# Patient Record
Sex: FEMALE | Race: ASIAN | NOT HISPANIC OR LATINO | URBAN - METROPOLITAN AREA
[De-identification: names, ages, dates, MRNs, and addresses within clinical notes are randomized per-mention and may not be internally consistent; named-entity substitution may affect disease eponyms.]

---

## 2019-06-14 ENCOUNTER — INPATIENT (INPATIENT)
Facility: HOSPITAL | Age: 74
LOS: 4 days | Discharge: ANOTHER HOSPITAL | DRG: 305 | End: 2019-06-19
Attending: INTERNAL MEDICINE | Admitting: INTERNAL MEDICINE
Payer: MEDICARE

## 2019-06-14 VITALS
WEIGHT: 134.92 LBS | OXYGEN SATURATION: 95 % | SYSTOLIC BLOOD PRESSURE: 113 MMHG | DIASTOLIC BLOOD PRESSURE: 76 MMHG | HEART RATE: 86 BPM | HEIGHT: 62 IN | RESPIRATION RATE: 20 BRPM | TEMPERATURE: 98 F

## 2019-06-14 DIAGNOSIS — N18.9 CHRONIC KIDNEY DISEASE, UNSPECIFIED: ICD-10-CM

## 2019-06-14 DIAGNOSIS — I10 ESSENTIAL (PRIMARY) HYPERTENSION: ICD-10-CM

## 2019-06-14 DIAGNOSIS — Z29.9 ENCOUNTER FOR PROPHYLACTIC MEASURES, UNSPECIFIED: ICD-10-CM

## 2019-06-14 DIAGNOSIS — E78.5 HYPERLIPIDEMIA, UNSPECIFIED: ICD-10-CM

## 2019-06-14 DIAGNOSIS — Z95.810 PRESENCE OF AUTOMATIC (IMPLANTABLE) CARDIAC DEFIBRILLATOR: Chronic | ICD-10-CM

## 2019-06-14 DIAGNOSIS — E11.9 TYPE 2 DIABETES MELLITUS WITHOUT COMPLICATIONS: ICD-10-CM

## 2019-06-14 DIAGNOSIS — I21.4 NON-ST ELEVATION (NSTEMI) MYOCARDIAL INFARCTION: ICD-10-CM

## 2019-06-14 DIAGNOSIS — I50.9 HEART FAILURE, UNSPECIFIED: ICD-10-CM

## 2019-06-14 DIAGNOSIS — I25.10 ATHEROSCLEROTIC HEART DISEASE OF NATIVE CORONARY ARTERY WITHOUT ANGINA PECTORIS: ICD-10-CM

## 2019-06-14 LAB
ALBUMIN SERPL ELPH-MCNC: 2.7 G/DL — LOW (ref 3.5–5)
ALP SERPL-CCNC: 103 U/L — SIGNIFICANT CHANGE UP (ref 40–120)
ALT FLD-CCNC: 20 U/L DA — SIGNIFICANT CHANGE UP (ref 10–60)
ANION GAP SERPL CALC-SCNC: 7 MMOL/L — SIGNIFICANT CHANGE UP (ref 5–17)
APTT BLD: 89.8 SEC — HIGH (ref 27.5–36.3)
AST SERPL-CCNC: 15 U/L — SIGNIFICANT CHANGE UP (ref 10–40)
BASOPHILS # BLD AUTO: 0.05 K/UL — SIGNIFICANT CHANGE UP (ref 0–0.2)
BASOPHILS NFR BLD AUTO: 0.4 % — SIGNIFICANT CHANGE UP (ref 0–2)
BILIRUB SERPL-MCNC: 0.5 MG/DL — SIGNIFICANT CHANGE UP (ref 0.2–1.2)
BUN SERPL-MCNC: 40 MG/DL — HIGH (ref 7–18)
CALCIUM SERPL-MCNC: 8 MG/DL — LOW (ref 8.4–10.5)
CHLORIDE SERPL-SCNC: 105 MMOL/L — SIGNIFICANT CHANGE UP (ref 96–108)
CK MB BLD-MCNC: 3 % — SIGNIFICANT CHANGE UP (ref 0–3.5)
CK MB CFR SERPL CALC: 2.2 NG/ML — SIGNIFICANT CHANGE UP (ref 0–3.6)
CK SERPL-CCNC: 74 U/L — SIGNIFICANT CHANGE UP (ref 21–215)
CO2 SERPL-SCNC: 24 MMOL/L — SIGNIFICANT CHANGE UP (ref 22–31)
CREAT SERPL-MCNC: 1.66 MG/DL — HIGH (ref 0.5–1.3)
EOSINOPHIL # BLD AUTO: 0.08 K/UL — SIGNIFICANT CHANGE UP (ref 0–0.5)
EOSINOPHIL NFR BLD AUTO: 0.7 % — SIGNIFICANT CHANGE UP (ref 0–6)
GLUCOSE BLDC GLUCOMTR-MCNC: 115 MG/DL — HIGH (ref 70–99)
GLUCOSE BLDC GLUCOMTR-MCNC: 169 MG/DL — HIGH (ref 70–99)
GLUCOSE SERPL-MCNC: 202 MG/DL — HIGH (ref 70–99)
HCT VFR BLD CALC: 30.9 % — LOW (ref 34.5–45)
HCT VFR BLD CALC: 31 % — LOW (ref 34.5–45)
HGB BLD-MCNC: 10.2 G/DL — LOW (ref 11.5–15.5)
HGB BLD-MCNC: 10.2 G/DL — LOW (ref 11.5–15.5)
IMM GRANULOCYTES NFR BLD AUTO: 0.4 % — SIGNIFICANT CHANGE UP (ref 0–1.5)
INR BLD: 1.16 RATIO — SIGNIFICANT CHANGE UP (ref 0.88–1.16)
LIDOCAIN IGE QN: 107 U/L — SIGNIFICANT CHANGE UP (ref 73–393)
LYMPHOCYTES # BLD AUTO: 1.89 K/UL — SIGNIFICANT CHANGE UP (ref 1–3.3)
LYMPHOCYTES # BLD AUTO: 16.9 % — SIGNIFICANT CHANGE UP (ref 13–44)
MAGNESIUM SERPL-MCNC: 2.4 MG/DL — SIGNIFICANT CHANGE UP (ref 1.6–2.6)
MCHC RBC-ENTMCNC: 27.1 PG — SIGNIFICANT CHANGE UP (ref 27–34)
MCHC RBC-ENTMCNC: 27.4 PG — SIGNIFICANT CHANGE UP (ref 27–34)
MCHC RBC-ENTMCNC: 32.9 GM/DL — SIGNIFICANT CHANGE UP (ref 32–36)
MCHC RBC-ENTMCNC: 33 GM/DL — SIGNIFICANT CHANGE UP (ref 32–36)
MCV RBC AUTO: 82.4 FL — SIGNIFICANT CHANGE UP (ref 80–100)
MCV RBC AUTO: 83.1 FL — SIGNIFICANT CHANGE UP (ref 80–100)
MONOCYTES # BLD AUTO: 0.78 K/UL — SIGNIFICANT CHANGE UP (ref 0–0.9)
MONOCYTES NFR BLD AUTO: 7 % — SIGNIFICANT CHANGE UP (ref 2–14)
NEUTROPHILS # BLD AUTO: 8.32 K/UL — HIGH (ref 1.8–7.4)
NEUTROPHILS NFR BLD AUTO: 74.6 % — SIGNIFICANT CHANGE UP (ref 43–77)
NRBC # BLD: 0 /100 WBCS — SIGNIFICANT CHANGE UP (ref 0–0)
NRBC # BLD: 0 /100 WBCS — SIGNIFICANT CHANGE UP (ref 0–0)
PLATELET # BLD AUTO: 263 K/UL — SIGNIFICANT CHANGE UP (ref 150–400)
PLATELET # BLD AUTO: 280 K/UL — SIGNIFICANT CHANGE UP (ref 150–400)
POTASSIUM SERPL-MCNC: 3.9 MMOL/L — SIGNIFICANT CHANGE UP (ref 3.5–5.3)
POTASSIUM SERPL-SCNC: 3.9 MMOL/L — SIGNIFICANT CHANGE UP (ref 3.5–5.3)
PROT SERPL-MCNC: 7.6 G/DL — SIGNIFICANT CHANGE UP (ref 6–8.3)
PROTHROM AB SERPL-ACNC: 12.9 SEC — SIGNIFICANT CHANGE UP (ref 10–12.9)
RBC # BLD: 3.72 M/UL — LOW (ref 3.8–5.2)
RBC # BLD: 3.76 M/UL — LOW (ref 3.8–5.2)
RBC # FLD: 13.5 % — SIGNIFICANT CHANGE UP (ref 10.3–14.5)
RBC # FLD: 13.6 % — SIGNIFICANT CHANGE UP (ref 10.3–14.5)
SODIUM SERPL-SCNC: 136 MMOL/L — SIGNIFICANT CHANGE UP (ref 135–145)
TROPONIN I SERPL-MCNC: 2.77 NG/ML — HIGH (ref 0–0.04)
TROPONIN I SERPL-MCNC: 3.14 NG/ML — HIGH (ref 0–0.04)
WBC # BLD: 10.43 K/UL — SIGNIFICANT CHANGE UP (ref 3.8–10.5)
WBC # BLD: 11.17 K/UL — HIGH (ref 3.8–10.5)
WBC # FLD AUTO: 10.43 K/UL — SIGNIFICANT CHANGE UP (ref 3.8–10.5)
WBC # FLD AUTO: 11.17 K/UL — HIGH (ref 3.8–10.5)

## 2019-06-14 PROCEDURE — 71046 X-RAY EXAM CHEST 2 VIEWS: CPT | Mod: 26

## 2019-06-14 PROCEDURE — 99285 EMERGENCY DEPT VISIT HI MDM: CPT

## 2019-06-14 RX ORDER — GLUCAGON INJECTION, SOLUTION 0.5 MG/.1ML
1 INJECTION, SOLUTION SUBCUTANEOUS ONCE
Refills: 0 | Status: DISCONTINUED | OUTPATIENT
Start: 2019-06-14 | End: 2019-06-19

## 2019-06-14 RX ORDER — DEXTROSE 50 % IN WATER 50 %
12.5 SYRINGE (ML) INTRAVENOUS ONCE
Refills: 0 | Status: DISCONTINUED | OUTPATIENT
Start: 2019-06-14 | End: 2019-06-17

## 2019-06-14 RX ORDER — ATORVASTATIN CALCIUM 80 MG/1
40 TABLET, FILM COATED ORAL AT BEDTIME
Refills: 0 | Status: DISCONTINUED | OUTPATIENT
Start: 2019-06-14 | End: 2019-06-19

## 2019-06-14 RX ORDER — ASPIRIN/CALCIUM CARB/MAGNESIUM 324 MG
81 TABLET ORAL DAILY
Refills: 0 | Status: DISCONTINUED | OUTPATIENT
Start: 2019-06-14 | End: 2019-06-19

## 2019-06-14 RX ORDER — METOPROLOL TARTRATE 50 MG
12.5 TABLET ORAL
Refills: 0 | Status: DISCONTINUED | OUTPATIENT
Start: 2019-06-14 | End: 2019-06-19

## 2019-06-14 RX ORDER — INSULIN GLARGINE 100 [IU]/ML
20 INJECTION, SOLUTION SUBCUTANEOUS AT BEDTIME
Refills: 0 | Status: DISCONTINUED | OUTPATIENT
Start: 2019-06-14 | End: 2019-06-16

## 2019-06-14 RX ORDER — DEXTROSE 50 % IN WATER 50 %
15 SYRINGE (ML) INTRAVENOUS ONCE
Refills: 0 | Status: DISCONTINUED | OUTPATIENT
Start: 2019-06-14 | End: 2019-06-17

## 2019-06-14 RX ORDER — DEXTROSE 50 % IN WATER 50 %
25 SYRINGE (ML) INTRAVENOUS ONCE
Refills: 0 | Status: DISCONTINUED | OUTPATIENT
Start: 2019-06-14 | End: 2019-06-17

## 2019-06-14 RX ORDER — AMLODIPINE BESYLATE 2.5 MG/1
2.5 TABLET ORAL DAILY
Refills: 0 | Status: DISCONTINUED | OUTPATIENT
Start: 2019-06-14 | End: 2019-06-19

## 2019-06-14 RX ORDER — ENOXAPARIN SODIUM 100 MG/ML
60 INJECTION SUBCUTANEOUS ONCE
Refills: 0 | Status: COMPLETED | OUTPATIENT
Start: 2019-06-14 | End: 2019-06-14

## 2019-06-14 RX ORDER — FAMOTIDINE 10 MG/ML
20 INJECTION INTRAVENOUS ONCE
Refills: 0 | Status: COMPLETED | OUTPATIENT
Start: 2019-06-14 | End: 2019-06-14

## 2019-06-14 RX ORDER — ASPIRIN/CALCIUM CARB/MAGNESIUM 324 MG
162 TABLET ORAL ONCE
Refills: 0 | Status: COMPLETED | OUTPATIENT
Start: 2019-06-14 | End: 2019-06-14

## 2019-06-14 RX ORDER — INSULIN LISPRO 100/ML
VIAL (ML) SUBCUTANEOUS AT BEDTIME
Refills: 0 | Status: DISCONTINUED | OUTPATIENT
Start: 2019-06-14 | End: 2019-06-19

## 2019-06-14 RX ORDER — HEPARIN SODIUM 5000 [USP'U]/ML
INJECTION INTRAVENOUS; SUBCUTANEOUS
Qty: 25000 | Refills: 0 | Status: DISCONTINUED | OUTPATIENT
Start: 2019-06-14 | End: 2019-06-18

## 2019-06-14 RX ORDER — INSULIN LISPRO 100/ML
VIAL (ML) SUBCUTANEOUS
Refills: 0 | Status: DISCONTINUED | OUTPATIENT
Start: 2019-06-14 | End: 2019-06-15

## 2019-06-14 RX ORDER — SODIUM CHLORIDE 9 MG/ML
1000 INJECTION, SOLUTION INTRAVENOUS
Refills: 0 | Status: DISCONTINUED | OUTPATIENT
Start: 2019-06-14 | End: 2019-06-17

## 2019-06-14 RX ORDER — TICAGRELOR 90 MG/1
90 TABLET ORAL
Refills: 0 | Status: DISCONTINUED | OUTPATIENT
Start: 2019-06-14 | End: 2019-06-19

## 2019-06-14 RX ORDER — HEPARIN SODIUM 5000 [USP'U]/ML
3800 INJECTION INTRAVENOUS; SUBCUTANEOUS EVERY 6 HOURS
Refills: 0 | Status: DISCONTINUED | OUTPATIENT
Start: 2019-06-14 | End: 2019-06-18

## 2019-06-14 RX ORDER — FUROSEMIDE 40 MG
20 TABLET ORAL DAILY
Refills: 0 | Status: DISCONTINUED | OUTPATIENT
Start: 2019-06-14 | End: 2019-06-19

## 2019-06-14 RX ORDER — FAMOTIDINE 10 MG/ML
20 INJECTION INTRAVENOUS ONCE
Refills: 0 | Status: COMPLETED | OUTPATIENT
Start: 2019-06-14 | End: 2019-06-15

## 2019-06-14 RX ADMIN — FAMOTIDINE 20 MILLIGRAM(S): 10 INJECTION INTRAVENOUS at 10:25

## 2019-06-14 RX ADMIN — HEPARIN SODIUM 650 UNIT(S)/HR: 5000 INJECTION INTRAVENOUS; SUBCUTANEOUS at 23:53

## 2019-06-14 RX ADMIN — HEPARIN SODIUM 0 UNIT(S)/HR: 5000 INJECTION INTRAVENOUS; SUBCUTANEOUS at 23:11

## 2019-06-14 RX ADMIN — ATORVASTATIN CALCIUM 40 MILLIGRAM(S): 80 TABLET, FILM COATED ORAL at 23:11

## 2019-06-14 RX ADMIN — Medication 30 MILLILITER(S): at 10:25

## 2019-06-14 RX ADMIN — Medication 162 MILLIGRAM(S): at 14:26

## 2019-06-14 RX ADMIN — HEPARIN SODIUM 750 UNIT(S)/HR: 5000 INJECTION INTRAVENOUS; SUBCUTANEOUS at 15:50

## 2019-06-14 RX ADMIN — ENOXAPARIN SODIUM 60 MILLIGRAM(S): 100 INJECTION SUBCUTANEOUS at 14:24

## 2019-06-14 RX ADMIN — INSULIN GLARGINE 20 UNIT(S): 100 INJECTION, SOLUTION SUBCUTANEOUS at 23:11

## 2019-06-14 NOTE — H&P ADULT - PROBLEM SELECTOR PLAN 1
-epigastric pain in setting of elevated trop of 2.6  -EKG sinus w/ ST depressions in 1, V1, V5, V6  -EKG findings may be 2/2 to pt's cardiomyopathy per cardio  -Recent echo performed this week at [facility] sig for EF of 20% w/ pulm htn, and recent stress test sig for fixed defects to apical and septal walls  -will start treatment with heparin drip for now   -continue to trend trops- T2@ 6p, and T3@ midnight   -serial EKG's  -c/w asa, statin, and BB  -f/u repeat echo  -Cardio: Dr. Hood

## 2019-06-14 NOTE — H&P ADULT - HISTORY OF PRESENT ILLNESS
Pt is a 73F, from home, w/ a PMHx of HTN, HLD, DM, CHF (EF 20%) w/ ICD in left chest, and CAD with who stent presents to the ED with abdominal painx Pt is a 73F, from home, w/ a PMHx of HTN, HLD, DM, CHF (EF 20%) w/ ICD in left chest, and CAD with stent placed Feb 2019 who presents to the ED with abdominal pain x 1 week.  Pt states the pain started on the Lt side of chest 1 week prior.  She went to [facility] and underwent cardiac eval including echo which showed known Systolic HF w/ EF 20% w/ pulm htn, and stress test neg for reversible ischemia but sig for fixed defects to apical and septal walls.  Troponin peaked at 1 at that time.  Pt was discharged with follow up to her OP cardiologist, Dr. Meehan, but she could not make the appt due to persistent pain. Pt states the pain is similar to when she has reflux and she started zantac therapy yesterday.  Pt now complains of epigastric pain radiating down abdomen.  Pt denies palpitations, SOB, dizziness, HA, blurred vision, slurred speech, N/V/D, constipation, fever or weakness.    In the ED, pt presents in no acute distress, vitals WNL, and EKG sinus w/ ST depressions in 1, V1, V5, V6

## 2019-06-14 NOTE — PATIENT PROFILE ADULT - VISION (WITH CORRECTIVE LENSES IF THE PATIENT USUALLY WEARS THEM):
Partially impaired: cannot see medication labels or newsprint, but can see obstacles in path, and the surrounding layout; can count fingers at arm's length/right eye blurry

## 2019-06-14 NOTE — ED ADULT NURSE NOTE - OBJECTIVE STATEMENT
pt c/o of epigastric and abdominal pain 10/10 sharp started last night at 0200AM. Pt c/o of nausea but denies vomiting. Last BM yesterday loose dark brown (pt took miraalax for constipation.) Pt brought in by daughter for further evaluation.

## 2019-06-14 NOTE — H&P ADULT - PROBLEM SELECTOR PLAN 3
-Stent placed in Nov, 2019  -c/w asa and brillinta therapy -elevated creat post stent placement- unknown baseline  -Pt sees OP nephro- Dr. Arnold   -managing team to contact OP nephro for baseline creat -On Lantus 35u HS, and SS TID  -will continue Lantus 20HS w/ sliding scale AC HS for now, uptitrate as clinically indicated   -f/u HbA1c  -Diabetic DIet

## 2019-06-14 NOTE — ED PROVIDER NOTE - OTHER FINDINGS
Isolated ST depression at lead I and borderline v5, v6 with T wave inversion at aVL. Borderline elevated ST waves at aVR.

## 2019-06-14 NOTE — H&P ADULT - PROBLEM SELECTOR PLAN 4
-c/w metoprolol therapy for now  -day team to attain medication list from daughter -Stent placed in Nov, 2019  -c/w asa and brillinta therapy -elevated creat post stent placement- unknown baseline  -Pt sees OP nephro- Dr. Arnold   -managing team to contact OP nephro for baseline creat

## 2019-06-14 NOTE — H&P ADULT - PROBLEM SELECTOR PLAN 8
[] Previous VTE                                                3  [] Thrombophilia                                             2  [] Lower limb paralysis                                   2    [] Current Cancer                                             2   [x] Immobilization > 24 hrs                              1  [] ICU/CCU stay > 24 hours                             1  [x] Age > 60                                                         1    IMPROVE VTE Score: On heparin Drip for ACS

## 2019-06-14 NOTE — H&P ADULT - PROBLEM SELECTOR PLAN 5
-c/w statin therapy   -f/u lipid panel -c/w metoprolol therapy for now  -resume lasix, hold ace with concern for renal dysfunction -Stent placed in Nov, 2019  -c/w asa and brillinta therapy

## 2019-06-14 NOTE — H&P ADULT - NSHPPHYSICALEXAM_GEN_ALL_CORE
Vital Signs Last 24 Hrs  T(C): 36.4 (14 Jun 2019 09:18), Max: 36.4 (14 Jun 2019 09:18)  T(F): 97.5 (14 Jun 2019 09:18), Max: 97.5 (14 Jun 2019 09:18)  HR: 86 (14 Jun 2019 09:18) (86 - 86)  BP: 113/76 (14 Jun 2019 09:18) (113/76 - 113/76)  RR: 20 (14 Jun 2019 09:18) (20 - 20)  SpO2: 95% (14 Jun 2019 09:18) (95% - 95%)

## 2019-06-14 NOTE — ED PROVIDER NOTE - OBJECTIVE STATEMENT
Patient is a 73 y.o. female with a significant PMHx HTN, HLD, diabetes, ICD in left chest, CHF, CAD with stent presents to the ED with abdominal pain. Patient was recently discharged from [facility] Tuesday for the same complaint of epigastric pain and LUQ pain with nausea and shortness of breath. Patient states it is not getting better and is worse with food. At [facility], she had an echo and stress test done. She is unsure of results but was discharged. She denies of any palpitations, ICD firing, diarrhea, dysuria, leg swelling, or diaphoresis. NKDA.

## 2019-06-14 NOTE — ED ADULT NURSE NOTE - ED STAT RN HANDOFF DETAILS
Report given to Monserrat from . ALL questions answered. Transported pt to [address]. Placed pt on tele. Heparin drip rate vertified with NGHIA German.

## 2019-06-14 NOTE — H&P ADULT - PROBLEM SELECTOR PLAN 2
-systolic CHF w/ EF 20%  -Pt has ICD in place   -will hold ace/arb for now in setting of elevated creat- may be pt's baseline, but do not have prior for comparison   -f/u Echo  -Please contact daughter for medication list -Systolic CHF w/ EF 20%  -Pt has ICD in place   -will hold ace/arb for now in setting of elevated creat- may be pt's baseline, but do not have prior for comparison   -c/w asa, BB, lasix for now   -f/u Echo

## 2019-06-14 NOTE — H&P ADULT - NSICDXPASTMEDICALHX_GEN_ALL_CORE_FT
PAST MEDICAL HISTORY:  CAD (coronary artery disease)     Chronic CHF Systolic EF 20%    Hyperlipidemia, unspecified hyperlipidemia type     Hypertension, unspecified type

## 2019-06-14 NOTE — H&P ADULT - RS GEN PE MLT RESP DETAILS PC
airway patent/breath sounds equal/good air movement/respirations non-labored/no rhonchi/no rales/no wheezes

## 2019-06-14 NOTE — ED PROVIDER NOTE - CLINICAL SUMMARY MEDICAL DECISION MAKING FREE TEXT BOX
Patient presenting with epigastric and LUQ pain. Likely acid reflux after having presumed normal stress test. Will r/o pancreatitis.

## 2019-06-14 NOTE — ED PROVIDER NOTE - PROGRESS NOTE DETAILS
garcia: pt still c/o epigastric pain.  work up shows trop 2.  otehrwise unchanged ekg still with s deression at lateral v5-v6.  spoke with Santo at [facility] and told pt had echo with ef 20% pulm htn and mild effusion cardiac.  stress neg.  fixed defect at apical, anterior and septal.  highest trop 1 at [facility].   admit to med [facility] for possible nstemi.  spoke with dr alvarez and will give asa and lovenox.

## 2019-06-14 NOTE — H&P ADULT - PROBLEM SELECTOR PROBLEM 6
Need for prophylactic measure Hyperlipidemia, unspecified hyperlipidemia type Hypertension, unspecified type

## 2019-06-14 NOTE — H&P ADULT - PROBLEM SELECTOR PROBLEM 5
Hyperlipidemia, unspecified hyperlipidemia type Hypertension, unspecified type CAD (coronary artery disease)

## 2019-06-14 NOTE — H&P ADULT - PROBLEM SELECTOR PLAN 6
[] Previous VTE                                                3  [] Thrombophilia                                             2  [] Lower limb paralysis                                   2    [] Current Cancer                                             2   [x] Immobilization > 24 hrs                              1  [] ICU/CCU stay > 24 hours                             1  [x] Age > 60                                                         1    IMPROVE VTE Score: On heparin Drip for ACS -c/w statin therapy   -f/u lipid panel -c/w metoprolol therapy for now  -resume lasix, hold ace with concern for renal dysfunction

## 2019-06-14 NOTE — ED PROVIDER NOTE - CARDIAC, MLM
Normal rate, regular rhythm.  Heart sounds S1, S2.  No murmurs, rubs or gallops. Pulses equal bilaterally.

## 2019-06-14 NOTE — H&P ADULT - ASSESSMENT
Pt is a 73F, from home, w/ a PMHx of HTN, HLD, DM, CHF (EF 20%) w/ ICD in left chest, and CAD with stent placed Feb 2019 who presents to the ED with abdominal pain x 1 week.  In the ED, pt presents in no acute distress, vitals WNL, and EKG sinus w/ ST depressions in 1, V1, V5, V6.  Pt remains afebrile w/o leukocytosis.  Remaining labs sig creat of 1.66, and trop of 2.6- last trop at [facility] recorded was 1.      Pt will be admitted to tele for management of NSTEMI     Pt unsure of home meds and could not reach daughter, Gena Maldonado, who is the HCP.  Managing team to reach out to daughter for meds and GOC discussion Pt is a 73F, from home, w/ a PMHx of HTN, HLD, DM, CHF (EF 20%) w/ ICD in left chest, and CAD with stent placed Feb 2019 who presents to the ED with abdominal pain x 1 week.  In the ED, pt presents in no acute distress, vitals WNL, and EKG sinus w/ ST depressions in 1, V1, V5, V6.  Pt remains afebrile w/o leukocytosis.  Remaining labs sig creat of 1.66, and trop of 2.6- last trop at [facility] recorded was 1.      Pt will be admitted to [facility] for management of NSTEMI     GOC: DNR/ DNI  HCP: Gena Maldonado [phone]

## 2019-06-14 NOTE — ED PROVIDER NOTE - MUSCULOSKELETAL, MLM
Spine appears normal, range of motion is not limited, no muscle or joint tenderness. No leg swelling. No CVA tenderness.

## 2019-06-14 NOTE — H&P ADULT - PROBLEM SELECTOR PLAN 7
[] Previous VTE                                                3  [] Thrombophilia                                             2  [] Lower limb paralysis                                   2    [] Current Cancer                                             2   [x] Immobilization > 24 hrs                              1  [] ICU/CCU stay > 24 hours                             1  [x] Age > 60                                                         1    IMPROVE VTE Score: On heparin Drip for ACS -c/w statin therapy   -f/u lipid panel

## 2019-06-14 NOTE — ED ADULT NURSE NOTE - NSIMPLEMENTINTERV_GEN_ALL_ED
Implemented All Universal Safety Interventions:  [place] to call system. Call bell, personal items and telephone within reach. Instruct patient to call for assistance. Room bathroom lighting operational. Non-slip footwear when patient is off stretcher. Physically safe environment: no spills, clutter or unnecessary equipment. Stretcher in lowest position, wheels locked, appropriate side rails in place.

## 2019-06-15 LAB
ANION GAP SERPL CALC-SCNC: 7 MMOL/L — SIGNIFICANT CHANGE UP (ref 5–17)
APTT BLD: 60.4 SEC — HIGH (ref 27.5–36.3)
APTT BLD: 62.1 SEC — HIGH (ref 27.5–36.3)
BUN SERPL-MCNC: 39 MG/DL — HIGH (ref 7–18)
CALCIUM SERPL-MCNC: 8.3 MG/DL — LOW (ref 8.4–10.5)
CHLORIDE SERPL-SCNC: 107 MMOL/L — SIGNIFICANT CHANGE UP (ref 96–108)
CHOLEST SERPL-MCNC: 118 MG/DL — SIGNIFICANT CHANGE UP (ref 10–199)
CK MB BLD-MCNC: 3 % — SIGNIFICANT CHANGE UP (ref 0–3.5)
CK MB CFR SERPL CALC: 2.2 NG/ML — SIGNIFICANT CHANGE UP (ref 0–3.6)
CK SERPL-CCNC: 74 U/L — SIGNIFICANT CHANGE UP (ref 21–215)
CO2 SERPL-SCNC: 25 MMOL/L — SIGNIFICANT CHANGE UP (ref 22–31)
CREAT SERPL-MCNC: 1.69 MG/DL — HIGH (ref 0.5–1.3)
GLUCOSE BLDC GLUCOMTR-MCNC: 267 MG/DL — HIGH (ref 70–99)
GLUCOSE BLDC GLUCOMTR-MCNC: 324 MG/DL — HIGH (ref 70–99)
GLUCOSE BLDC GLUCOMTR-MCNC: 382 MG/DL — HIGH (ref 70–99)
GLUCOSE BLDC GLUCOMTR-MCNC: 92 MG/DL — SIGNIFICANT CHANGE UP (ref 70–99)
GLUCOSE SERPL-MCNC: 83 MG/DL — SIGNIFICANT CHANGE UP (ref 70–99)
HBA1C BLD-MCNC: 15.1 % — HIGH (ref 4–5.6)
HBA1C BLD-MCNC: 15.4 % — HIGH (ref 4–5.6)
HCT VFR BLD CALC: 31.9 % — LOW (ref 34.5–45)
HCV AB S/CO SERPL IA: 0.09 S/CO — SIGNIFICANT CHANGE UP (ref 0–0.99)
HCV AB SERPL-IMP: SIGNIFICANT CHANGE UP
HDLC SERPL-MCNC: 29 MG/DL — LOW
HGB BLD-MCNC: 10.3 G/DL — LOW (ref 11.5–15.5)
INR BLD: 1.09 RATIO — SIGNIFICANT CHANGE UP (ref 0.88–1.16)
LIPID PNL WITH DIRECT LDL SERPL: 56 MG/DL — SIGNIFICANT CHANGE UP
MAGNESIUM SERPL-MCNC: 2.5 MG/DL — SIGNIFICANT CHANGE UP (ref 1.6–2.6)
MCHC RBC-ENTMCNC: 26.6 PG — LOW (ref 27–34)
MCHC RBC-ENTMCNC: 32.3 GM/DL — SIGNIFICANT CHANGE UP (ref 32–36)
MCV RBC AUTO: 82.4 FL — SIGNIFICANT CHANGE UP (ref 80–100)
NRBC # BLD: 0 /100 WBCS — SIGNIFICANT CHANGE UP (ref 0–0)
PHOSPHATE SERPL-MCNC: 3.6 MG/DL — SIGNIFICANT CHANGE UP (ref 2.5–4.5)
PLATELET # BLD AUTO: 276 K/UL — SIGNIFICANT CHANGE UP (ref 150–400)
POTASSIUM SERPL-MCNC: 4.2 MMOL/L — SIGNIFICANT CHANGE UP (ref 3.5–5.3)
POTASSIUM SERPL-SCNC: 4.2 MMOL/L — SIGNIFICANT CHANGE UP (ref 3.5–5.3)
PROTHROM AB SERPL-ACNC: 12.1 SEC — SIGNIFICANT CHANGE UP (ref 10–12.9)
RBC # BLD: 3.87 M/UL — SIGNIFICANT CHANGE UP (ref 3.8–5.2)
RBC # FLD: 13.9 % — SIGNIFICANT CHANGE UP (ref 10.3–14.5)
SODIUM SERPL-SCNC: 139 MMOL/L — SIGNIFICANT CHANGE UP (ref 135–145)
TOTAL CHOLESTEROL/HDL RATIO MEASUREMENT: 4.1 RATIO — SIGNIFICANT CHANGE UP (ref 3.3–7.1)
TRIGL SERPL-MCNC: 165 MG/DL — HIGH (ref 10–149)
TROPONIN I SERPL-MCNC: 2.77 NG/ML — HIGH (ref 0–0.04)
TSH SERPL-MCNC: 1.66 UU/ML — SIGNIFICANT CHANGE UP (ref 0.34–4.82)
VIT B12 SERPL-MCNC: 1091 PG/ML — SIGNIFICANT CHANGE UP (ref 232–1245)
WBC # BLD: 9.78 K/UL — SIGNIFICANT CHANGE UP (ref 3.8–10.5)
WBC # FLD AUTO: 9.78 K/UL — SIGNIFICANT CHANGE UP (ref 3.8–10.5)

## 2019-06-15 RX ORDER — ACETAMINOPHEN 500 MG
650 TABLET ORAL ONCE
Refills: 0 | Status: COMPLETED | OUTPATIENT
Start: 2019-06-15 | End: 2019-06-15

## 2019-06-15 RX ORDER — INSULIN LISPRO 100/ML
VIAL (ML) SUBCUTANEOUS
Refills: 0 | Status: DISCONTINUED | OUTPATIENT
Start: 2019-06-15 | End: 2019-06-19

## 2019-06-15 RX ORDER — SUCRALFATE 1 G
1 TABLET ORAL
Refills: 0 | Status: DISCONTINUED | OUTPATIENT
Start: 2019-06-15 | End: 2019-06-18

## 2019-06-15 RX ORDER — PANTOPRAZOLE SODIUM 20 MG/1
40 TABLET, DELAYED RELEASE ORAL
Refills: 0 | Status: DISCONTINUED | OUTPATIENT
Start: 2019-06-15 | End: 2019-06-19

## 2019-06-15 RX ORDER — TRAMADOL HYDROCHLORIDE 50 MG/1
25 TABLET ORAL ONCE
Refills: 0 | Status: DISCONTINUED | OUTPATIENT
Start: 2019-06-15 | End: 2019-06-15

## 2019-06-15 RX ADMIN — Medication 81 MILLIGRAM(S): at 12:33

## 2019-06-15 RX ADMIN — Medication 2: at 21:49

## 2019-06-15 RX ADMIN — Medication 1 GRAM(S): at 17:37

## 2019-06-15 RX ADMIN — ATORVASTATIN CALCIUM 40 MILLIGRAM(S): 80 TABLET, FILM COATED ORAL at 21:36

## 2019-06-15 RX ADMIN — HEPARIN SODIUM 650 UNIT(S)/HR: 5000 INJECTION INTRAVENOUS; SUBCUTANEOUS at 18:34

## 2019-06-15 RX ADMIN — INSULIN GLARGINE 20 UNIT(S): 100 INJECTION, SOLUTION SUBCUTANEOUS at 21:47

## 2019-06-15 RX ADMIN — Medication 3: at 12:34

## 2019-06-15 RX ADMIN — TICAGRELOR 90 MILLIGRAM(S): 90 TABLET ORAL at 05:58

## 2019-06-15 RX ADMIN — HEPARIN SODIUM 650 UNIT(S)/HR: 5000 INJECTION INTRAVENOUS; SUBCUTANEOUS at 08:20

## 2019-06-15 RX ADMIN — TRAMADOL HYDROCHLORIDE 25 MILLIGRAM(S): 50 TABLET ORAL at 21:36

## 2019-06-15 RX ADMIN — Medication 12.5 MILLIGRAM(S): at 05:58

## 2019-06-15 RX ADMIN — TICAGRELOR 90 MILLIGRAM(S): 90 TABLET ORAL at 17:37

## 2019-06-15 RX ADMIN — FAMOTIDINE 20 MILLIGRAM(S): 10 INJECTION INTRAVENOUS at 06:00

## 2019-06-15 RX ADMIN — Medication 5: at 17:36

## 2019-06-15 RX ADMIN — Medication 650 MILLIGRAM(S): at 17:36

## 2019-06-15 RX ADMIN — AMLODIPINE BESYLATE 2.5 MILLIGRAM(S): 2.5 TABLET ORAL at 05:57

## 2019-06-15 RX ADMIN — TRAMADOL HYDROCHLORIDE 25 MILLIGRAM(S): 50 TABLET ORAL at 22:23

## 2019-06-15 RX ADMIN — Medication 20 MILLIGRAM(S): at 05:59

## 2019-06-16 LAB
ALBUMIN SERPL ELPH-MCNC: 2.7 G/DL — LOW (ref 3.5–5)
ALP SERPL-CCNC: 106 U/L — SIGNIFICANT CHANGE UP (ref 40–120)
ALT FLD-CCNC: 16 U/L DA — SIGNIFICANT CHANGE UP (ref 10–60)
ANION GAP SERPL CALC-SCNC: 10 MMOL/L — SIGNIFICANT CHANGE UP (ref 5–17)
APTT BLD: 63.2 SEC — HIGH (ref 27.5–36.3)
AST SERPL-CCNC: 16 U/L — SIGNIFICANT CHANGE UP (ref 10–40)
BASOPHILS # BLD AUTO: 0.07 K/UL — SIGNIFICANT CHANGE UP (ref 0–0.2)
BASOPHILS NFR BLD AUTO: 0.7 % — SIGNIFICANT CHANGE UP (ref 0–2)
BILIRUB SERPL-MCNC: 0.5 MG/DL — SIGNIFICANT CHANGE UP (ref 0.2–1.2)
BUN SERPL-MCNC: 53 MG/DL — HIGH (ref 7–18)
CALCIUM SERPL-MCNC: 8.3 MG/DL — LOW (ref 8.4–10.5)
CHLORIDE SERPL-SCNC: 102 MMOL/L — SIGNIFICANT CHANGE UP (ref 96–108)
CO2 SERPL-SCNC: 23 MMOL/L — SIGNIFICANT CHANGE UP (ref 22–31)
CREAT SERPL-MCNC: 2.12 MG/DL — HIGH (ref 0.5–1.3)
EOSINOPHIL # BLD AUTO: 0.36 K/UL — SIGNIFICANT CHANGE UP (ref 0–0.5)
EOSINOPHIL NFR BLD AUTO: 3.5 % — SIGNIFICANT CHANGE UP (ref 0–6)
GLUCOSE BLDC GLUCOMTR-MCNC: 120 MG/DL — HIGH (ref 70–99)
GLUCOSE BLDC GLUCOMTR-MCNC: 202 MG/DL — HIGH (ref 70–99)
GLUCOSE BLDC GLUCOMTR-MCNC: 251 MG/DL — HIGH (ref 70–99)
GLUCOSE BLDC GLUCOMTR-MCNC: 307 MG/DL — HIGH (ref 70–99)
GLUCOSE SERPL-MCNC: 275 MG/DL — HIGH (ref 70–99)
HCT VFR BLD CALC: 31.9 % — LOW (ref 34.5–45)
HGB BLD-MCNC: 10.1 G/DL — LOW (ref 11.5–15.5)
IMM GRANULOCYTES NFR BLD AUTO: 0.5 % — SIGNIFICANT CHANGE UP (ref 0–1.5)
LYMPHOCYTES # BLD AUTO: 2.76 K/UL — SIGNIFICANT CHANGE UP (ref 1–3.3)
LYMPHOCYTES # BLD AUTO: 27.1 % — SIGNIFICANT CHANGE UP (ref 13–44)
MAGNESIUM SERPL-MCNC: 2.8 MG/DL — HIGH (ref 1.6–2.6)
MCHC RBC-ENTMCNC: 26.4 PG — LOW (ref 27–34)
MCHC RBC-ENTMCNC: 31.7 GM/DL — LOW (ref 32–36)
MCV RBC AUTO: 83.5 FL — SIGNIFICANT CHANGE UP (ref 80–100)
MONOCYTES # BLD AUTO: 0.61 K/UL — SIGNIFICANT CHANGE UP (ref 0–0.9)
MONOCYTES NFR BLD AUTO: 6 % — SIGNIFICANT CHANGE UP (ref 2–14)
NEUTROPHILS # BLD AUTO: 6.32 K/UL — SIGNIFICANT CHANGE UP (ref 1.8–7.4)
NEUTROPHILS NFR BLD AUTO: 62.2 % — SIGNIFICANT CHANGE UP (ref 43–77)
NRBC # BLD: 0 /100 WBCS — SIGNIFICANT CHANGE UP (ref 0–0)
PHOSPHATE SERPL-MCNC: 4.1 MG/DL — SIGNIFICANT CHANGE UP (ref 2.5–4.5)
PLATELET # BLD AUTO: 303 K/UL — SIGNIFICANT CHANGE UP (ref 150–400)
POTASSIUM SERPL-MCNC: 4.4 MMOL/L — SIGNIFICANT CHANGE UP (ref 3.5–5.3)
POTASSIUM SERPL-SCNC: 4.4 MMOL/L — SIGNIFICANT CHANGE UP (ref 3.5–5.3)
PROT SERPL-MCNC: 7.8 G/DL — SIGNIFICANT CHANGE UP (ref 6–8.3)
RBC # BLD: 3.82 M/UL — SIGNIFICANT CHANGE UP (ref 3.8–5.2)
RBC # FLD: 13.6 % — SIGNIFICANT CHANGE UP (ref 10.3–14.5)
SODIUM SERPL-SCNC: 135 MMOL/L — SIGNIFICANT CHANGE UP (ref 135–145)
WBC # BLD: 10.17 K/UL — SIGNIFICANT CHANGE UP (ref 3.8–10.5)
WBC # FLD AUTO: 10.17 K/UL — SIGNIFICANT CHANGE UP (ref 3.8–10.5)

## 2019-06-16 RX ORDER — INSULIN LISPRO 100/ML
3 VIAL (ML) SUBCUTANEOUS
Refills: 0 | Status: DISCONTINUED | OUTPATIENT
Start: 2019-06-16 | End: 2019-06-17

## 2019-06-16 RX ORDER — ACETAMINOPHEN 500 MG
650 TABLET ORAL EVERY 6 HOURS
Refills: 0 | Status: DISCONTINUED | OUTPATIENT
Start: 2019-06-16 | End: 2019-06-19

## 2019-06-16 RX ORDER — INSULIN GLARGINE 100 [IU]/ML
25 INJECTION, SOLUTION SUBCUTANEOUS AT BEDTIME
Refills: 0 | Status: DISCONTINUED | OUTPATIENT
Start: 2019-06-16 | End: 2019-06-17

## 2019-06-16 RX ORDER — ONDANSETRON 8 MG/1
4 TABLET, FILM COATED ORAL ONCE
Refills: 0 | Status: COMPLETED | OUTPATIENT
Start: 2019-06-16 | End: 2019-06-16

## 2019-06-16 RX ADMIN — Medication 1 GRAM(S): at 06:48

## 2019-06-16 RX ADMIN — ONDANSETRON 4 MILLIGRAM(S): 8 TABLET, FILM COATED ORAL at 17:13

## 2019-06-16 RX ADMIN — Medication 6: at 08:23

## 2019-06-16 RX ADMIN — Medication 1 GRAM(S): at 17:11

## 2019-06-16 RX ADMIN — Medication 30 MILLILITER(S): at 06:45

## 2019-06-16 RX ADMIN — PANTOPRAZOLE SODIUM 40 MILLIGRAM(S): 20 TABLET, DELAYED RELEASE ORAL at 06:48

## 2019-06-16 RX ADMIN — INSULIN GLARGINE 25 UNIT(S): 100 INJECTION, SOLUTION SUBCUTANEOUS at 21:42

## 2019-06-16 RX ADMIN — ATORVASTATIN CALCIUM 40 MILLIGRAM(S): 80 TABLET, FILM COATED ORAL at 21:42

## 2019-06-16 RX ADMIN — Medication 12.5 MILLIGRAM(S): at 17:11

## 2019-06-16 RX ADMIN — TICAGRELOR 90 MILLIGRAM(S): 90 TABLET ORAL at 06:48

## 2019-06-16 RX ADMIN — Medication 8: at 12:07

## 2019-06-16 RX ADMIN — Medication 4: at 17:12

## 2019-06-16 RX ADMIN — Medication 650 MILLIGRAM(S): at 21:42

## 2019-06-16 RX ADMIN — Medication 3 UNIT(S): at 17:13

## 2019-06-16 RX ADMIN — HEPARIN SODIUM 650 UNIT(S)/HR: 5000 INJECTION INTRAVENOUS; SUBCUTANEOUS at 10:29

## 2019-06-16 RX ADMIN — TICAGRELOR 90 MILLIGRAM(S): 90 TABLET ORAL at 17:11

## 2019-06-16 RX ADMIN — ONDANSETRON 4 MILLIGRAM(S): 8 TABLET, FILM COATED ORAL at 12:07

## 2019-06-16 RX ADMIN — Medication 81 MILLIGRAM(S): at 12:08

## 2019-06-16 NOTE — PROGRESS NOTE ADULT - ATTENDING COMMENTS
Patient was seen and examined,interim events noted,labs and radiology studies reviewed.  Salvador Hood MD,FACC.  [address] [place] [address].  [facility].  [phone]
Patient was seen and examined,interim events noted,labs and radiology studies reviewed.  Salvador Hood MD,FACC.  [address] [place] [address].  [id number].  [phone]

## 2019-06-17 LAB
ALBUMIN SERPL ELPH-MCNC: 2.4 G/DL — LOW (ref 3.5–5)
ALP SERPL-CCNC: 96 U/L — SIGNIFICANT CHANGE UP (ref 40–120)
ALT FLD-CCNC: 19 U/L DA — SIGNIFICANT CHANGE UP (ref 10–60)
ANION GAP SERPL CALC-SCNC: 9 MMOL/L — SIGNIFICANT CHANGE UP (ref 5–17)
APTT BLD: 36.4 SEC — HIGH (ref 27.5–36.3)
APTT BLD: 68.9 SEC — HIGH (ref 27.5–36.3)
APTT BLD: 87.6 SEC — HIGH (ref 27.5–36.3)
AST SERPL-CCNC: 22 U/L — SIGNIFICANT CHANGE UP (ref 10–40)
BASOPHILS # BLD AUTO: 0.04 K/UL — SIGNIFICANT CHANGE UP (ref 0–0.2)
BASOPHILS NFR BLD AUTO: 0.3 % — SIGNIFICANT CHANGE UP (ref 0–2)
BILIRUB SERPL-MCNC: 0.5 MG/DL — SIGNIFICANT CHANGE UP (ref 0.2–1.2)
BUN SERPL-MCNC: 50 MG/DL — HIGH (ref 7–18)
CALCIUM SERPL-MCNC: 8 MG/DL — LOW (ref 8.4–10.5)
CHLORIDE SERPL-SCNC: 103 MMOL/L — SIGNIFICANT CHANGE UP (ref 96–108)
CK MB BLD-MCNC: 3.7 % — HIGH (ref 0–3.5)
CK MB CFR SERPL CALC: 2 NG/ML — SIGNIFICANT CHANGE UP (ref 0–3.6)
CK SERPL-CCNC: 54 U/L — SIGNIFICANT CHANGE UP (ref 21–215)
CO2 SERPL-SCNC: 21 MMOL/L — LOW (ref 22–31)
CREAT SERPL-MCNC: 1.91 MG/DL — HIGH (ref 0.5–1.3)
EOSINOPHIL # BLD AUTO: 0.15 K/UL — SIGNIFICANT CHANGE UP (ref 0–0.5)
EOSINOPHIL NFR BLD AUTO: 1.2 % — SIGNIFICANT CHANGE UP (ref 0–6)
GLUCOSE BLDC GLUCOMTR-MCNC: 123 MG/DL — HIGH (ref 70–99)
GLUCOSE BLDC GLUCOMTR-MCNC: 127 MG/DL — HIGH (ref 70–99)
GLUCOSE BLDC GLUCOMTR-MCNC: 178 MG/DL — HIGH (ref 70–99)
GLUCOSE BLDC GLUCOMTR-MCNC: 265 MG/DL — HIGH (ref 70–99)
GLUCOSE SERPL-MCNC: 134 MG/DL — HIGH (ref 70–99)
HCT VFR BLD CALC: 27.3 % — LOW (ref 34.5–45)
HCT VFR BLD CALC: 31.6 % — LOW (ref 34.5–45)
HGB BLD-MCNC: 10.2 G/DL — LOW (ref 11.5–15.5)
HGB BLD-MCNC: 8.9 G/DL — LOW (ref 11.5–15.5)
IMM GRANULOCYTES NFR BLD AUTO: 0.7 % — SIGNIFICANT CHANGE UP (ref 0–1.5)
LYMPHOCYTES # BLD AUTO: 18.5 % — SIGNIFICANT CHANGE UP (ref 13–44)
LYMPHOCYTES # BLD AUTO: 2.22 K/UL — SIGNIFICANT CHANGE UP (ref 1–3.3)
MAGNESIUM SERPL-MCNC: 2.8 MG/DL — HIGH (ref 1.6–2.6)
MCHC RBC-ENTMCNC: 26.4 PG — LOW (ref 27–34)
MCHC RBC-ENTMCNC: 26.7 PG — LOW (ref 27–34)
MCHC RBC-ENTMCNC: 32.3 GM/DL — SIGNIFICANT CHANGE UP (ref 32–36)
MCHC RBC-ENTMCNC: 32.6 GM/DL — SIGNIFICANT CHANGE UP (ref 32–36)
MCV RBC AUTO: 81 FL — SIGNIFICANT CHANGE UP (ref 80–100)
MCV RBC AUTO: 82.7 FL — SIGNIFICANT CHANGE UP (ref 80–100)
MONOCYTES # BLD AUTO: 0.61 K/UL — SIGNIFICANT CHANGE UP (ref 0–0.9)
MONOCYTES NFR BLD AUTO: 5.1 % — SIGNIFICANT CHANGE UP (ref 2–14)
NEUTROPHILS # BLD AUTO: 8.92 K/UL — HIGH (ref 1.8–7.4)
NEUTROPHILS NFR BLD AUTO: 74.2 % — SIGNIFICANT CHANGE UP (ref 43–77)
NRBC # BLD: 0 /100 WBCS — SIGNIFICANT CHANGE UP (ref 0–0)
NRBC # BLD: 0 /100 WBCS — SIGNIFICANT CHANGE UP (ref 0–0)
PHOSPHATE SERPL-MCNC: 4.2 MG/DL — SIGNIFICANT CHANGE UP (ref 2.5–4.5)
PLATELET # BLD AUTO: 284 K/UL — SIGNIFICANT CHANGE UP (ref 150–400)
PLATELET # BLD AUTO: 311 K/UL — SIGNIFICANT CHANGE UP (ref 150–400)
POTASSIUM SERPL-MCNC: 4.7 MMOL/L — SIGNIFICANT CHANGE UP (ref 3.5–5.3)
POTASSIUM SERPL-SCNC: 4.7 MMOL/L — SIGNIFICANT CHANGE UP (ref 3.5–5.3)
PROT SERPL-MCNC: 6.9 G/DL — SIGNIFICANT CHANGE UP (ref 6–8.3)
RBC # BLD: 3.37 M/UL — LOW (ref 3.8–5.2)
RBC # BLD: 3.82 M/UL — SIGNIFICANT CHANGE UP (ref 3.8–5.2)
RBC # FLD: 13.2 % — SIGNIFICANT CHANGE UP (ref 10.3–14.5)
RBC # FLD: 13.6 % — SIGNIFICANT CHANGE UP (ref 10.3–14.5)
SODIUM SERPL-SCNC: 133 MMOL/L — LOW (ref 135–145)
TROPONIN I SERPL-MCNC: 1.52 NG/ML — HIGH (ref 0–0.04)
WBC # BLD: 11.94 K/UL — HIGH (ref 3.8–10.5)
WBC # BLD: 12.02 K/UL — HIGH (ref 3.8–10.5)
WBC # FLD AUTO: 11.94 K/UL — HIGH (ref 3.8–10.5)
WBC # FLD AUTO: 12.02 K/UL — HIGH (ref 3.8–10.5)

## 2019-06-17 PROCEDURE — 74018 RADEX ABDOMEN 1 VIEW: CPT | Mod: 26

## 2019-06-17 PROCEDURE — 74176 CT ABD & PELVIS W/O CONTRAST: CPT | Mod: 26

## 2019-06-17 RX ORDER — TRAMADOL HYDROCHLORIDE 50 MG/1
25 TABLET ORAL ONCE
Refills: 0 | Status: DISCONTINUED | OUTPATIENT
Start: 2019-06-17 | End: 2019-06-17

## 2019-06-17 RX ORDER — TRAMADOL HYDROCHLORIDE 50 MG/1
25 TABLET ORAL EVERY 8 HOURS
Refills: 0 | Status: DISCONTINUED | OUTPATIENT
Start: 2019-06-17 | End: 2019-06-19

## 2019-06-17 RX ORDER — INSULIN LISPRO 100/ML
8 VIAL (ML) SUBCUTANEOUS
Refills: 0 | Status: DISCONTINUED | OUTPATIENT
Start: 2019-06-17 | End: 2019-06-18

## 2019-06-17 RX ORDER — SUCRALFATE 1 G
1 TABLET ORAL
Qty: 0 | Refills: 0 | DISCHARGE
Start: 2019-06-17

## 2019-06-17 RX ORDER — INSULIN GLARGINE 100 [IU]/ML
30 INJECTION, SOLUTION SUBCUTANEOUS AT BEDTIME
Refills: 0 | Status: DISCONTINUED | OUTPATIENT
Start: 2019-06-17 | End: 2019-06-18

## 2019-06-17 RX ORDER — PANTOPRAZOLE SODIUM 20 MG/1
1 TABLET, DELAYED RELEASE ORAL
Qty: 0 | Refills: 0 | DISCHARGE
Start: 2019-06-17

## 2019-06-17 RX ORDER — HEPARIN SODIUM 5000 [USP'U]/ML
750 INJECTION INTRAVENOUS; SUBCUTANEOUS
Qty: 0 | Refills: 0 | DISCHARGE
Start: 2019-06-17

## 2019-06-17 RX ORDER — INSULIN LISPRO 100/ML
3 VIAL (ML) SUBCUTANEOUS ONCE
Refills: 0 | Status: COMPLETED | OUTPATIENT
Start: 2019-06-17 | End: 2019-06-17

## 2019-06-17 RX ADMIN — PANTOPRAZOLE SODIUM 40 MILLIGRAM(S): 20 TABLET, DELAYED RELEASE ORAL at 05:04

## 2019-06-17 RX ADMIN — Medication 81 MILLIGRAM(S): at 11:20

## 2019-06-17 RX ADMIN — HEPARIN SODIUM 550 UNIT(S)/HR: 5000 INJECTION INTRAVENOUS; SUBCUTANEOUS at 14:13

## 2019-06-17 RX ADMIN — Medication 1 GRAM(S): at 17:36

## 2019-06-17 RX ADMIN — Medication 2: at 08:08

## 2019-06-17 RX ADMIN — HEPARIN SODIUM 550 UNIT(S)/HR: 5000 INJECTION INTRAVENOUS; SUBCUTANEOUS at 08:04

## 2019-06-17 RX ADMIN — Medication 3 UNIT(S): at 18:12

## 2019-06-17 RX ADMIN — ATORVASTATIN CALCIUM 40 MILLIGRAM(S): 80 TABLET, FILM COATED ORAL at 21:34

## 2019-06-17 RX ADMIN — Medication 3 UNIT(S): at 11:50

## 2019-06-17 RX ADMIN — HEPARIN SODIUM 0 UNIT(S)/HR: 5000 INJECTION INTRAVENOUS; SUBCUTANEOUS at 07:33

## 2019-06-17 RX ADMIN — Medication 3 UNIT(S): at 08:07

## 2019-06-17 RX ADMIN — Medication 650 MILLIGRAM(S): at 05:03

## 2019-06-17 RX ADMIN — Medication 650 MILLIGRAM(S): at 04:41

## 2019-06-17 RX ADMIN — TICAGRELOR 90 MILLIGRAM(S): 90 TABLET ORAL at 17:36

## 2019-06-17 RX ADMIN — Medication 20 MILLIGRAM(S): at 05:04

## 2019-06-17 RX ADMIN — AMLODIPINE BESYLATE 2.5 MILLIGRAM(S): 2.5 TABLET ORAL at 05:04

## 2019-06-17 RX ADMIN — HEPARIN SODIUM 750 UNIT(S)/HR: 5000 INJECTION INTRAVENOUS; SUBCUTANEOUS at 21:25

## 2019-06-17 RX ADMIN — INSULIN GLARGINE 30 UNIT(S): 100 INJECTION, SOLUTION SUBCUTANEOUS at 21:38

## 2019-06-17 RX ADMIN — HEPARIN SODIUM 3800 UNIT(S): 5000 INJECTION INTRAVENOUS; SUBCUTANEOUS at 21:29

## 2019-06-17 RX ADMIN — TICAGRELOR 90 MILLIGRAM(S): 90 TABLET ORAL at 05:04

## 2019-06-17 RX ADMIN — Medication 6: at 11:50

## 2019-06-17 RX ADMIN — Medication 12.5 MILLIGRAM(S): at 05:04

## 2019-06-17 RX ADMIN — Medication 1 GRAM(S): at 05:04

## 2019-06-17 NOTE — ACUTE INTERFACILITY TRANSFER NOTE - HOSPITAL COURSE
Pt is a 73F, from home, w/ a PMHx of HTN, HLD, DM, CHF (EF 20%) w/ ICD in left chest, and CAD with stent placed Feb 2019 who presents to the ED with abdominal pain x 1 week.  In the ED, pt presents in no acute distress, vitals WNL, and EKG sinus w/ ST depressions in 1, V1, V5, V6.  Pt remains afebrile w/o leukocytosis.  Remaining labs sig creat of 1.66, and trop of 2.6- last trop at [facility] recorded was 1.  Pt was admitted to [facility] for management of NSTEMI, started on heparin drip as per cardio Dr Hood. Troponin increased to 3.14 before downtrending to 2.77. Patient's creatinine level being 1.9, decision was made for transfer to [facility] for cardiac cath. Pt is a 73F, from home, w/ a PMHx of HTN, HLD, DM, CHF (EF 20%) w/ ICD in left chest, and CAD with stent placed Feb 2019 who presents to the ED with abdominal pain x 1 week.  In the ED, pt presents in no acute distress, vitals WNL, and EKG sinus w/ ST depressions in 1, V1, V5, V6.  Pt remains afebrile w/o leukocytosis.  Remaining labs sig creat of 1.66, and trop of 2.6- last trop at [facility] recorded was 1.  Pt was admitted to [facility] for management of NSTEMI, started on heparin drip as per cardio Dr Hood. Troponin increased to 3.14 before downtrending to 2.77. She was noted to have roverto on ckd with creatinine elevated to 2.12. Patient's creatinine level being 1.9, decision was made for transfer to [facility] for cardiac cath. Pt is a 73F, from home, w/ a PMHx of HTN, HLD, DM, CHF (EF 20%) w/ ICD in left chest, and CAD with stent placed Feb 2019 who presents to the ED with abdominal pain x 1 week.  In the ED, pt presents in no acute distress, vitals WNL, and EKG sinus w/ ST depressions in 1, V1, V5, V6.  Pt remains afebrile w/o leukocytosis.  Remaining labs sig creat of 1.66, and trop of 2.6- last trop at [facility] recorded was 1.  Pt was admitted to [facility] for management of NSTEMI, started on heparin drip as per cardio Dr Hood. Troponin increased to 3.14 before downtrending to 2.77. She was noted to have roverto on ckd with creatinine elevated to 2.12. Noted to have stable but lower level of hgb, pain resolved. Heparin drip to be held, patient for transfer to [facility] for cardiac cath.

## 2019-06-17 NOTE — ACUTE INTERFACILITY TRANSFER NOTE - PLAN OF CARE
Transfer for cardiac catheterization You presented with epigastric pain  in setting of elevated trop of 2.6, which trended to 0.314 before downtrending. Your EKG  was noted with ST depressions in 1, V1, V5, V6. You were evaluated by cardiologist who recommended transfer to [facility] for cardiac cath. Continue care recommended at [facility] post cath Continue care as recommended You have chronic heart failure with EF of 30% and cardiac device. Hemoglobin A1C <7 Your hemoglobin A1C was 15 on admission which reflects poor control of your blood sugars. Continue your home dose of insulin and monitor fingersticks to make necessary changes. You have known worsening of renal function since your cath. Your creatinine level is 1.9 today. Continue care with your outpatient nephrologist. You have known worsening of renal function since your cath. Your creatinine level was elevated from 1.6 to 2, is improving and is 1.9 today. Continue care as per team at [facility]. You presented with epigastric pain  in setting of elevated trop of 2.6, which trended to 3.14 before downtrending. Your EKG  was noted with ST depressions in 1, V1, V5, V6. You were evaluated by cardiologist who recommended starting heparin drip while you were stabilized for transfer to [facility] for cardiac cath. You have known worsening of renal function since your cath. Your creatinine level was elevated from 1.6 to 2, noted to be fluctuating. Continue care as per team at [facility].

## 2019-06-17 NOTE — ACUTE INTERFACILITY TRANSFER NOTE - SECONDARY DIAGNOSIS.
CAD (coronary artery disease) Chronic CHF Diabetes CKD (chronic kidney disease) Acute on chronic renal insufficiency

## 2019-06-17 NOTE — CHART NOTE - NSCHARTNOTEFT_GEN_A_CORE
Notified by nursing staff that patient was complaining of abdominal pain rated at 10/10 in severity. Patient examined at bedside. Reports pain to be localized midline in the abdomen and says it is similar to the pain she was admitted with. Will check stat cardiac enzymes and EKG. Will also pursue stat abdominal xray. Notified by nursing staff that patient was complaining of abdominal pain rated at 10/10 in severity. Patient examined at bedside. Reports pain to be localized midline in the abdomen and says it is similar to the pain she was admitted with. EKG grossly unchanged. f/u cardiac enzymes. Will also pursue stat abdominal xray. Notified by nursing staff that patient was complaining of abdominal pain rated at 10/10 in severity. Patient examined at bedside. Appears in distress due to pain. Reports pain to be localized midline in the abdomen and says it is similar to the pain she was admitted with. EKG grossly unchanged. f/u cardiac enzymes. Will also pursue stat abdominal xray. Primary team to follow.

## 2019-06-17 NOTE — CHART NOTE - NSCHARTNOTEFT_GEN_A_CORE
Nutrition Note: pending Nutrition Focus Note:   Nutrition assessment for high HgbA1C=15.4. Chart reviewed, pt visited, h/o DM for many years, on Lantus PTA, decreased intake recently, abdominal pain x 1 wk, pending tranfer to [facility] for cardiac cath. Pt receptive to written copy of DM diet, not a good candidate for comprehensive diet counseling at present, encourage to follow up with health care team. Discussed with MD/RN Nutrition Focus Note:   Nutrition assessment for high HgbA1C=15.4. Chart reviewed, pt visited, h/o DM for many years, on Lantus PTA, decreased intake recently, abdominal pain x 1 wk, pending tranfer to [facility] for cardiac cath. Pt receptive to basic DM diet reinforcement with written copy given, not a good candidate for comprehensive diet counseling at present, encourage to follow up with health care team. Discussed with RN

## 2019-06-18 LAB
ANION GAP SERPL CALC-SCNC: 8 MMOL/L — SIGNIFICANT CHANGE UP (ref 5–17)
APTT BLD: 85.9 SEC — HIGH (ref 27.5–36.3)
APTT BLD: >200 SEC — CRITICAL HIGH (ref 27.5–36.3)
BUN SERPL-MCNC: 50 MG/DL — HIGH (ref 7–18)
CALCIUM SERPL-MCNC: 8.2 MG/DL — LOW (ref 8.4–10.5)
CHLORIDE SERPL-SCNC: 101 MMOL/L — SIGNIFICANT CHANGE UP (ref 96–108)
CO2 SERPL-SCNC: 25 MMOL/L — SIGNIFICANT CHANGE UP (ref 22–31)
CREAT SERPL-MCNC: 2.04 MG/DL — HIGH (ref 0.5–1.3)
GLUCOSE BLDC GLUCOMTR-MCNC: 108 MG/DL — HIGH (ref 70–99)
GLUCOSE BLDC GLUCOMTR-MCNC: 185 MG/DL — HIGH (ref 70–99)
GLUCOSE BLDC GLUCOMTR-MCNC: 238 MG/DL — HIGH (ref 70–99)
GLUCOSE BLDC GLUCOMTR-MCNC: 70 MG/DL — SIGNIFICANT CHANGE UP (ref 70–99)
GLUCOSE SERPL-MCNC: 60 MG/DL — LOW (ref 70–99)
HCT VFR BLD CALC: 27.9 % — LOW (ref 34.5–45)
HGB BLD-MCNC: 9.1 G/DL — LOW (ref 11.5–15.5)
MCHC RBC-ENTMCNC: 26.8 PG — LOW (ref 27–34)
MCHC RBC-ENTMCNC: 32.6 GM/DL — SIGNIFICANT CHANGE UP (ref 32–36)
MCV RBC AUTO: 82.3 FL — SIGNIFICANT CHANGE UP (ref 80–100)
NRBC # BLD: 0 /100 WBCS — SIGNIFICANT CHANGE UP (ref 0–0)
PLATELET # BLD AUTO: 287 K/UL — SIGNIFICANT CHANGE UP (ref 150–400)
POTASSIUM SERPL-MCNC: 4.7 MMOL/L — SIGNIFICANT CHANGE UP (ref 3.5–5.3)
POTASSIUM SERPL-SCNC: 4.7 MMOL/L — SIGNIFICANT CHANGE UP (ref 3.5–5.3)
RBC # BLD: 3.39 M/UL — LOW (ref 3.8–5.2)
RBC # FLD: 13.5 % — SIGNIFICANT CHANGE UP (ref 10.3–14.5)
SODIUM SERPL-SCNC: 134 MMOL/L — LOW (ref 135–145)
WBC # BLD: 11.83 K/UL — HIGH (ref 3.8–10.5)
WBC # FLD AUTO: 11.83 K/UL — HIGH (ref 3.8–10.5)

## 2019-06-18 RX ORDER — ONDANSETRON 8 MG/1
4 TABLET, FILM COATED ORAL EVERY 6 HOURS
Refills: 0 | Status: DISCONTINUED | OUTPATIENT
Start: 2019-06-18 | End: 2019-06-19

## 2019-06-18 RX ORDER — INSULIN GLARGINE 100 [IU]/ML
25 INJECTION, SOLUTION SUBCUTANEOUS AT BEDTIME
Refills: 0 | Status: DISCONTINUED | OUTPATIENT
Start: 2019-06-18 | End: 2019-06-19

## 2019-06-18 RX ORDER — SUCRALFATE 1 G
1 TABLET ORAL
Refills: 0 | Status: DISCONTINUED | OUTPATIENT
Start: 2019-06-18 | End: 2019-06-19

## 2019-06-18 RX ORDER — POLYETHYLENE GLYCOL 3350 17 G/17G
17 POWDER, FOR SOLUTION ORAL DAILY
Refills: 0 | Status: DISCONTINUED | OUTPATIENT
Start: 2019-06-18 | End: 2019-06-19

## 2019-06-18 RX ORDER — ACETAMINOPHEN 500 MG
1000 TABLET ORAL ONCE
Refills: 0 | Status: DISCONTINUED | OUTPATIENT
Start: 2019-06-18 | End: 2019-06-19

## 2019-06-18 RX ORDER — SENNA PLUS 8.6 MG/1
2 TABLET ORAL AT BEDTIME
Refills: 0 | Status: DISCONTINUED | OUTPATIENT
Start: 2019-06-18 | End: 2019-06-19

## 2019-06-18 RX ORDER — INSULIN LISPRO 100/ML
3 VIAL (ML) SUBCUTANEOUS
Refills: 0 | Status: DISCONTINUED | OUTPATIENT
Start: 2019-06-18 | End: 2019-06-19

## 2019-06-18 RX ORDER — DOCUSATE SODIUM 100 MG
100 CAPSULE ORAL
Refills: 0 | Status: DISCONTINUED | OUTPATIENT
Start: 2019-06-18 | End: 2019-06-19

## 2019-06-18 RX ADMIN — Medication 1 GRAM(S): at 12:23

## 2019-06-18 RX ADMIN — Medication 1 GRAM(S): at 17:49

## 2019-06-18 RX ADMIN — Medication 650 MILLIGRAM(S): at 21:57

## 2019-06-18 RX ADMIN — Medication 650 MILLIGRAM(S): at 23:22

## 2019-06-18 RX ADMIN — INSULIN GLARGINE 25 UNIT(S): 100 INJECTION, SOLUTION SUBCUTANEOUS at 21:54

## 2019-06-18 RX ADMIN — ATORVASTATIN CALCIUM 40 MILLIGRAM(S): 80 TABLET, FILM COATED ORAL at 21:55

## 2019-06-18 RX ADMIN — Medication 12.5 MILLIGRAM(S): at 17:49

## 2019-06-18 RX ADMIN — PANTOPRAZOLE SODIUM 40 MILLIGRAM(S): 20 TABLET, DELAYED RELEASE ORAL at 05:02

## 2019-06-18 RX ADMIN — Medication 1 GRAM(S): at 05:02

## 2019-06-18 RX ADMIN — Medication 4: at 17:47

## 2019-06-18 RX ADMIN — Medication 81 MILLIGRAM(S): at 12:23

## 2019-06-18 RX ADMIN — Medication 650 MILLIGRAM(S): at 13:00

## 2019-06-18 RX ADMIN — Medication 3 UNIT(S): at 17:48

## 2019-06-18 RX ADMIN — TICAGRELOR 90 MILLIGRAM(S): 90 TABLET ORAL at 05:02

## 2019-06-18 RX ADMIN — Medication 2: at 12:22

## 2019-06-18 RX ADMIN — Medication 650 MILLIGRAM(S): at 12:22

## 2019-06-18 RX ADMIN — HEPARIN SODIUM 550 UNIT(S)/HR: 5000 INJECTION INTRAVENOUS; SUBCUTANEOUS at 04:58

## 2019-06-18 RX ADMIN — TICAGRELOR 90 MILLIGRAM(S): 90 TABLET ORAL at 17:49

## 2019-06-18 RX ADMIN — POLYETHYLENE GLYCOL 3350 17 GRAM(S): 17 POWDER, FOR SOLUTION ORAL at 18:21

## 2019-06-18 RX ADMIN — HEPARIN SODIUM 0 UNIT(S)/HR: 5000 INJECTION INTRAVENOUS; SUBCUTANEOUS at 03:53

## 2019-06-18 RX ADMIN — Medication 100 MILLIGRAM(S): at 17:48

## 2019-06-18 NOTE — PROGRESS NOTE ADULT - PROBLEM SELECTOR PLAN 2
-Systolic CHF w/ EF 20%  -Pt has ICD in place   -will hold ace/arb for now in setting of elevated creat- may be pt's baseline, but do not have prior for comparison   -c/w asa, BB, lasix for now   -f/u Echo

## 2019-06-18 NOTE — PROGRESS NOTE ADULT - SUBJECTIVE AND OBJECTIVE BOX
PGY1 Note discussed with Supervising Resident and Primary Attending.    Patient is a 73y old  Female who presents with a chief complaint of Abd Pain (14 Jun 2019 14:51)      INTERVAL HPI/OVERNIGHT EVENTS :    ***********************************************************************************************************    MEDICATIONS  (STANDING):  amLODIPine   Tablet 2.5 milliGRAM(s) Oral daily  aspirin enteric coated 81 milliGRAM(s) Oral daily  atorvastatin 40 milliGRAM(s) Oral at bedtime  dextrose 5%. 1000 milliLiter(s) (50 mL/Hr) IV Continuous <Continuous>  dextrose 50% Injectable 12.5 Gram(s) IV Push once  dextrose 50% Injectable 25 Gram(s) IV Push once  dextrose 50% Injectable 25 Gram(s) IV Push once  furosemide    Tablet 20 milliGRAM(s) Oral daily  heparin  Infusion.  Unit(s)/Hr (7.5 mL/Hr) IV Continuous <Continuous>  insulin glargine Injectable (LANTUS) 20 Unit(s) SubCutaneous at bedtime  insulin lispro (HumaLOG) corrective regimen sliding scale   SubCutaneous three times a day before meals  insulin lispro (HumaLOG) corrective regimen sliding scale   SubCutaneous at bedtime  metoprolol tartrate 12.5 milliGRAM(s) Oral two times a day  ticagrelor 90 milliGRAM(s) Oral two times a day    MEDICATIONS  (PRN):  aluminum hydroxide/magnesium hydroxide/simethicone Suspension 30 milliLiter(s) Oral every 6 hours PRN Dyspepsia  dextrose 40% Gel 15 Gram(s) Oral once PRN Blood Glucose LESS THAN 70 milliGRAM(s)/deciLiter  glucagon  Injectable 1 milliGRAM(s) IntraMuscular once PRN Glucose <70 milliGRAM(s)/deciLiter  heparin  Injectable 3800 Unit(s) IV Push every 6 hours PRN For aPTT less than 40      ***********************************************************************************************************    Allergies    No Known Allergies    Intolerances        ***********************************************************************************************************    REVIEW OF SYSTEMS :  * CONSTITUTIONAL      : No Fever, Weight loss, or Fatigue  * EYES                             : No eye pain , Visual disturbances or Discharge  * RESPIRATORY             : No Cough, Wheezing, Chills or Hemoptysis; No shortness of breath  * CARDIOVASCULAR     : No Chest pain, Palpitations, Dizziness, or Leg swelling  * GASTROINTESTINAL  : No Abdominal or Epigastric pain. No Nausea, Vomiting or Hematemesis; No Diarrhea or Constipation. No Melena or Hematochezia.  * GENITOURINARY        : No Dysuria , Frequency , Haematuria   * NEUROLOGICAL          : No Headaches, Memory loss, Loss of trength, Numbness, or Tremors  * MUSCULOSKELETAL   : No Joint pain  * PSYCHIATRY                 : No Depression or Anxiety   * HEME/LYMPH              : No Easy Bruising or Bleeding gums  * SKIN                               : No Itching, Burning, Rashes, or Lesions     ***********************************************************************************************************    Vital Signs Last 24 Hrs  T(C): 36.2 (15 Anthony 2019 07:23), Max: 36.7 (14 Jun 2019 15:22)  T(F): 97.1 (15 Anthony 2019 07:23), Max: 98 (14 Jun 2019 15:22)  HR: 77 (15 Anthony 2019 07:23) (77 - 92)  BP: 102/59 (15 Anthony 2019 07:23) (102/59 - 119/59)  BP(mean): --  RR: 18 (15 Anthony 2019 07:23) (18 - 19)  SpO2: 100% (15 Anthony 2019 07:23) (98% - 100%)    ***********************************************************************************************************    PHYSICAL EXAM :  * GENERAL                 : NAD, Well-groomed, Well-developed  * HEAD                       :  Atraumatic, Normocephalic  * EYES                         : EOMI, PERRLA, Conjunctiva and Sclera clear  * ENT                           : Moist Mucous Membranes  * NECK                         : Supple, No JVD, Normal Thyroid  * CHEST/LUNG           : Clear to Auscultation bilaterally; No Rales, Rhonchi, Wheezing or Rubs  * HEART                       : Regular Rate and Rhythm; No murmurs, Rubs or gallops  * ABDOMEN                : Soft, Non-tender, Non-distended; Bowel Sounds present  * NERVOUS SYSTEM  :  Alert & Oriented X3, Good Concentration; Motor Strength 5/5 B/L UL LL ; DTRs 2+ Intact and Symmetric  * EXTREMITIES            :  2+ Peripheral Pulses, No clubbing, cyanosis, or edema  * SKIN                           : No Rashes or Lesions    **********************************************************************************************************  LABS:                          10.3   9.78  )-----------( 276      ( 15 Anthony 2019 07:30 )             31.9     06-15    139  |  107  |  39<H>  ----------------------------<  83  4.2   |  25  |  1.69<H>    Ca    8.3<L>      15 Anthony 2019 07:30  Phos  3.6     06-15  Mg     2.5     06-15    TPro  7.6  /  Alb  2.7<L>  /  TBili  0.5  /  DBili  x   /  AST  15  /  ALT  20  /  AlkPhos  103  06-14    PT/INR - ( 14 Jun 2019 15:40 )   PT: 12.9 sec;   INR: 1.16 ratio         PTT - ( 15 Anthony 2019 07:30 )  PTT:60.4 sec    CAPILLARY BLOOD GLUCOSE      POCT Blood Glucose.: 92 mg/dL (15 Anthony 2019 07:58)  POCT Blood Glucose.: 169 mg/dL (14 Jun 2019 23:09)  POCT Blood Glucose.: 115 mg/dL (14 Jun 2019 22:05)      **********************************************************************************************************    RADIOLOGY & ADDITIONAL TESTS:   No radiological imaging was required    Imaging Personally Reviewed   :  [ ] YES  [ ] NO    Consultant(s) Notes Reviewed :  [ ] YES  [ ] NO
PGY1 Note discussed with supervising resident and primary attending.    Patient is a 73y old  Female who presents with a chief complaint of Abd Pain (17 Jun 2019 16:52)      INTERVAL HPI/OVERNIGHT EVENTS: patient's AM fs noted to be low to 70. Assessed bedside, reports abdominal pain has significantly improved but she does not have a good appetite and is not eating well. Will decrease dose of insulin to prevent hypoglycemic episodes.     MEDICATIONS  (STANDING):  amLODIPine   Tablet 2.5 milliGRAM(s) Oral daily  aspirin enteric coated 81 milliGRAM(s) Oral daily  atorvastatin 40 milliGRAM(s) Oral at bedtime  furosemide    Tablet 20 milliGRAM(s) Oral daily  insulin glargine Injectable (LANTUS) 25 Unit(s) SubCutaneous at bedtime  insulin lispro (HumaLOG) corrective regimen sliding scale   SubCutaneous three times a day before meals  insulin lispro (HumaLOG) corrective regimen sliding scale   SubCutaneous at bedtime  insulin lispro Injectable (HumaLOG) 3 Unit(s) SubCutaneous three times a day before meals  metoprolol tartrate 12.5 milliGRAM(s) Oral two times a day  pantoprazole    Tablet 40 milliGRAM(s) Oral before breakfast  sucralfate 1 Gram(s) Oral four times a day  ticagrelor 90 milliGRAM(s) Oral two times a day    MEDICATIONS  (PRN):  acetaminophen   Tablet .. 650 milliGRAM(s) Oral every 6 hours PRN Temp greater or equal to 38C (100.4F), Mild Pain (1 - 3)  aluminum hydroxide/magnesium hydroxide/simethicone Suspension 30 milliLiter(s) Oral every 6 hours PRN Dyspepsia  glucagon  Injectable 1 milliGRAM(s) IntraMuscular once PRN Glucose <70 milliGRAM(s)/deciLiter  traMADol 25 milliGRAM(s) Oral every 8 hours PRN Moderate Pain (4 - 6)      Allergies    No Known Allergies    Intolerances    REVIEW OF SYSTEMS:  CONSTITUTIONAL: No fever, weight loss, or fatigue  RESPIRATORY: No cough, wheezing, chills or hemoptysis; No shortness of breath  CARDIOVASCULAR: No chest pain, palpitations, dizziness, or leg swelling  GASTROINTESTINAL: Abdominal pain improved. No nausea, vomiting, or hematemesis; No diarrhea or constipation. No melena or hematochezia.  NEUROLOGICAL: No headaches, memory loss, loss of strength, numbness, or tremors  SKIN: No itching, burning, rashes, or lesions       Vital Signs Last 24 Hrs  T(C): 36.6 (18 Jun 2019 07:37), Max: 36.9 (17 Jun 2019 23:17)  T(F): 97.8 (18 Jun 2019 07:37), Max: 98.4 (17 Jun 2019 23:17)  HR: 74 (18 Jun 2019 07:37) (70 - 79)  BP: 108/75 (18 Jun 2019 07:37) (106/57 - 113/58)  BP(mean): --  RR: 18 (18 Jun 2019 07:37) (18 - 18)  SpO2: 100% (18 Jun 2019 07:37) (96% - 100%)    PHYSICAL EXAM:  GENERAL: NAD, well-groomed, well-developed  CHEST/LUNG: Clear to auscultation  HEART: Regular rate and rhythm; No murmurs, rubs, or gallops  ABDOMEN: Soft, epigastric tenderness, Nondistended; Bowel sounds present  NERVOUS SYSTEM:  Alert & Oriented X3, Good concentration; Motor Strength 5/5 B/L   EXTREMITIES:  no edema    LABS:                        9.1    11.83 )-----------( 287      ( 18 Jun 2019 07:15 )             27.9     06-18    134<L>  |  101  |  50<H>  ----------------------------<  60<L>  4.7   |  25  |  2.04<H>    Ca    8.2<L>      18 Jun 2019 07:15  Phos  4.2     06-17  Mg     2.8     06-17    TPro  6.9  /  Alb  2.4<L>  /  TBili  0.5  /  DBili  x   /  AST  22  /  ALT  19  /  AlkPhos  96  06-17    PTT - ( 18 Jun 2019 03:23 )  PTT:>200.0 sec    CAPILLARY BLOOD GLUCOSE      POCT Blood Glucose.: 70 mg/dL (18 Jun 2019 08:20)  POCT Blood Glucose.: 127 mg/dL (17 Jun 2019 21:14)  POCT Blood Glucose.: 123 mg/dL (17 Jun 2019 16:55)    Consultant(s) Notes Reviewed:  [ x ] YES  [ ] NO
PRESENTING CC:Abdominal pain    SUBJ: 73F, from home, w/ a PMHx of HTN, HLD, T2DM, CHF (EF 20%) w/ ICD in left chest, and CAD with stent placed Feb 2019 who presents to the ED with abdominal pain x 1 week.      PMH -reviewed admission note, no change since admission  Heart failure: acute [ ] chronic [x] acute or chronic [ ] diastolic [ ] systolic [x ] combined systolic and diastolic[ ]  RUTH: ATN[ ] renal medullary necrosis [ ] CKD I [ ]CKDII [ ]CKD III [ ]CKD IV [ ]CKD V [ ]Other pathological lesions [ ]    MEDICATIONS  (STANDING):  amLODIPine   Tablet 2.5 milliGRAM(s) Oral daily  aspirin enteric coated 81 milliGRAM(s) Oral daily  atorvastatin 40 milliGRAM(s) Oral at bedtime  furosemide    Tablet 20 milliGRAM(s) Oral daily  heparin  Infusion.  Unit(s)/Hr (7.5 mL/Hr) IV Continuous <Continuous>  insulin glargine Injectable (LANTUS) 20 Unit(s) SubCutaneous at bedtime  insulin lispro (HumaLOG) corrective regimen sliding scale   SubCutaneous three times a day before meals  insulin lispro (HumaLOG) corrective regimen sliding scale   SubCutaneous at bedtime  metoprolol tartrate 12.5 milliGRAM(s) Oral two times a day  pantoprazole    Tablet 40 milliGRAM(s) Oral before breakfast  sucralfate 1 Gram(s) Oral two times a day  ticagrelor 90 milliGRAM(s) Oral two times a day    MEDICATIONS  (PRN):  aluminum hydroxide/magnesium hydroxide/simethicone Suspension 30 milliLiter(s) Oral every 6 hours PRN Dyspepsia  dextrose 40% Gel 15 Gram(s) Oral once PRN Blood Glucose LESS THAN 70 milliGRAM(s)/deciLiter  glucagon  Injectable 1 milliGRAM(s) IntraMuscular once PRN Glucose <70 milliGRAM(s)/deciLiter  heparin  Injectable 3800 Unit(s) IV Push every 6 hours PRN For aPTT less than 40          FAMILY HISTORY:    No family history of premature coronary artery disease or sudden cardiac death      REVIEW OF SYSTEMS:  Constitutional: [ ] fever, [ ]weight loss,  [ ]fatigue  Eyes: [ ] visual changes  Respiratory: [ ]shortness of breath;  [ ] cough, [ ]wheezing, [ ]chills, [ ]hemoptysis  Cardiovascular: [ ] chest pain, [ ]palpitations, [ ]dizziness,  [ ]leg swelling[ ]orthopnea[ ]PND  Gastrointestinal: [ ] abdominal pain, [ ]nausea, [ ]vomiting,  [ ]diarrhea   Genitourinary: [ ] dysuria, [ ] hematuria  Neurologic: [ ] headaches [ ] tremors[ ]weakness  Skin: [ ] itching, [ ]burning, [ ] rashes  Endocrine: [ ] heat or cold intolerance  Musculoskeletal: [ ] joint pain or swelling; [ ] muscle, back, or extremity pain  Psychiatric: [ ] depression, [ ]anxiety, [ ]mood swings, or [ ]difficulty sleeping  Hematologic: [ ] easy bruising, [ ] bleeding gums    [x] All remaining systems negative except as per above.   [ ]Unable to obtain.    Vital Signs Last 24 Hrs  T(C): 36.3 (16 Jun 2019 08:02), Max: 36.6 (15 Anthony 2019 16:17)  T(F): 97.4 (16 Jun 2019 08:02), Max: 97.8 (15 Anthony 2019 16:17)  HR: 80 (16 Jun 2019 08:02) (66 - 98)  BP: 98/65 (16 Jun 2019 08:02) (95/48 - 115/61)  RR: 18 (16 Jun 2019 08:02) (18 - 18)  SpO2: 100% (16 Jun 2019 08:02) (98% - 100%)  I&O's Summary      PHYSICAL EXAM:  General: No acute distress BMI-26  HEENT: EOMI, PERRL  Neck: Supple, [ ] JVD  Lungs: Equal air entry bilaterally; [ ] rales [ ] wheezing [ ] rhonchi  Heart: Regular rate and rhythm; [x ] murmur  2 /6 [x ] systolic [ ] diastolic [ ] radiation[ ] rubs [ ]  gallops  Abdomen: Nontender, bowel sounds present  Extremities: No clubbing, cyanosis, [ ] edema  Nervous system:  Alert & Oriented X3, no focal deficits  Psychiatric: Normal affect  Skin: No rashes or lesions    LABS:  06-16    135  |  102  |  53<H>  ----------------------------<  275<H>  4.4   |  23  |  2.12<H>    Ca    8.3<L>      16 Jun 2019 07:26  Phos  4.1     06-16  Mg     2.8     06-16    TPro  7.8  /  Alb  2.7<L>  /  TBili  0.5  /  DBili  x   /  AST  16  /  ALT  16  /  AlkPhos  106  06-16    Creatinine Trend: 2.12<--, 1.69<--, 1.66<--                        10.1   10.17 )-----------( 303      ( 16 Jun 2019 07:26 )             31.9     PT/INR - ( 15 Anthony 2019 15:38 )   PT: 12.1 sec;   INR: 1.09 ratio         PTT - ( 16 Jun 2019 07:26 )  PTT:63.2 sec  Lipid Panel:   Cardiac Enzymes: CARDIAC MARKERS ( 15 Anthony 2019 00:32 )  2.770 ng/mL / x     / 74 U/L / x     / 2.2 ng/mL  CARDIAC MARKERS ( 14 Jun 2019 18:22 )  3.140 ng/mL / x     / 74 U/L / x     / 2.2 ng/mL  CARDIAC MARKERS ( 14 Jun 2019 12:21 )  2.770 ng/mL / x     / x     / x     / x                RADIOLOGY:    ECG [my interpretation]:    TELEMETRY:    ECHO:    STRESS TEST:    CATHETERIZATION:      IMPRESSION AND PLAN:    Problem/Plan - 1:  ·  Problem: NSTEMI (non-ST elevated myocardial infarction).  Plan: -epigastric pain in setting of elevated trop of 2.6  -EKG sinus w/ ST depressions in 1, V1, V5, V6  -EKG findings may be 2/2 to pt's cardiomyopathy per cardio  -Recent echo performed this week at [facility] sig for EF of 20% w/ pulm htn, and recent stress test sig for fixed defects to apical and septal walls      Problem/Plan - 2:  ·  Problem: Chronic CHF.  Plan: -Systolic CHF w/ EF 20%  -Pt has ICD in place   -will hold ace/arb for now in setting of elevated creat-    Problem/Plan - 3:  ·  Problem: Diabetes.  Plan: -On Lantus 35u HS, and SS TID      Problem/Plan - 4:  ·  Problem: CKD (chronic kidney disease).  Plan: -elevated creat post stent placement- unknown baseline
PGY1 Note discussed with supervising resident and primary attending.    Patient is a 73y old  Female who presents with a chief complaint of Abd Pain (17 Jun 2019 10:24)      INTERVAL HPI/OVERNIGHT EVENTS: patient assessed bedside, continues to report abdominal pain, epigastric in location; was planned for transfer for cardiac cath however cancelled as drop noted in hgb to 8.9, wbc count elevated to 12. CT a/p with no bleeding noted.    MEDICATIONS  (STANDING):  amLODIPine   Tablet 2.5 milliGRAM(s) Oral daily  aspirin enteric coated 81 milliGRAM(s) Oral daily  atorvastatin 40 milliGRAM(s) Oral at bedtime  furosemide    Tablet 20 milliGRAM(s) Oral daily  heparin  Infusion.  Unit(s)/Hr (7.5 mL/Hr) IV Continuous <Continuous>  insulin glargine Injectable (LANTUS) 25 Unit(s) SubCutaneous at bedtime  insulin lispro (HumaLOG) corrective regimen sliding scale   SubCutaneous three times a day before meals  insulin lispro (HumaLOG) corrective regimen sliding scale   SubCutaneous at bedtime  insulin lispro Injectable (HumaLOG) 3 Unit(s) SubCutaneous three times a day before meals  metoprolol tartrate 12.5 milliGRAM(s) Oral two times a day  pantoprazole    Tablet 40 milliGRAM(s) Oral before breakfast  sucralfate 1 Gram(s) Oral two times a day  ticagrelor 90 milliGRAM(s) Oral two times a day    MEDICATIONS  (PRN):  acetaminophen   Tablet .. 650 milliGRAM(s) Oral every 6 hours PRN Temp greater or equal to 38C (100.4F), Mild Pain (1 - 3)  aluminum hydroxide/magnesium hydroxide/simethicone Suspension 30 milliLiter(s) Oral every 6 hours PRN Dyspepsia  glucagon  Injectable 1 milliGRAM(s) IntraMuscular once PRN Glucose <70 milliGRAM(s)/deciLiter  heparin  Injectable 3800 Unit(s) IV Push every 6 hours PRN For aPTT less than 40  traMADol 25 milliGRAM(s) Oral every 8 hours PRN Moderate Pain (4 - 6)      Allergies    No Known Allergies    Intolerances        REVIEW OF SYSTEMS:  CONSTITUTIONAL: No fever, weight loss, or fatigue  RESPIRATORY: No cough, wheezing, chills or hemoptysis; No shortness of breath  CARDIOVASCULAR: No chest pain, palpitations, dizziness, or leg swelling  GASTROINTESTINAL: Abdominal pain. No nausea, vomiting, or hematemesis; No diarrhea or constipation. No melena or hematochezia.  NEUROLOGICAL: No headaches, memory loss, loss of strength, numbness, or tremors  SKIN: No itching, burning, rashes, or lesions     Vital Signs Last 24 Hrs  T(C): 36.3 (17 Jun 2019 15:52), Max: 36.8 (17 Jun 2019 05:13)  T(F): 97.3 (17 Jun 2019 15:52), Max: 98.2 (17 Jun 2019 05:13)  HR: 70 (17 Jun 2019 15:52) (60 - 79)  BP: 106/57 (17 Jun 2019 15:52) (91/54 - 114/70)  BP(mean): --  RR: 18 (17 Jun 2019 15:52) (17 - 18)  SpO2: 96% (17 Jun 2019 15:52) (96% - 100%)    PHYSICAL EXAM:  GENERAL: NAD, well-groomed, well-developed  CHEST/LUNG: Clear to auscultation  HEART: Regular rate and rhythm; No murmurs, rubs, or gallops  ABDOMEN: Soft, generalized tenderness, more in epigastric region, Nondistended; Bowel sounds present  NERVOUS SYSTEM:  Alert & Oriented X3, Good concentration; Motor Strength 5/5 B/L   EXTREMITIES:  no edema      LABS:                        8.9    12.02 )-----------( 284      ( 17 Jun 2019 06:24 )             27.3     06-17    133<L>  |  103  |  50<H>  ----------------------------<  134<H>  4.7   |  21<L>  |  1.91<H>    Ca    8.0<L>      17 Jun 2019 06:24  Phos  4.2     06-17  Mg     2.8     06-17    TPro  6.9  /  Alb  2.4<L>  /  TBili  0.5  /  DBili  x   /  AST  22  /  ALT  19  /  AlkPhos  96  06-17    PTT - ( 17 Jun 2019 13:58 )  PTT:68.9 sec    CAPILLARY BLOOD GLUCOSE      POCT Blood Glucose.: 265 mg/dL (17 Jun 2019 11:38)  POCT Blood Glucose.: 178 mg/dL (17 Jun 2019 07:55)  POCT Blood Glucose.: 120 mg/dL (16 Jun 2019 21:27)      RADIOLOGY & ADDITIONAL TESTS: < from: CT Abdomen and Pelvis No Cont (06.17.19 @ 12:25) >  FINDINGS:    LOWER CHEST: Mild cardiomegaly. There is a pacemaker/defibrillator lead   in the heart. Small right pleural effusion and trace left pleural   effusion. Atherosclerotic descending thoracic aorta.    LIVER: There is a tubular fat-containing lesion in the lateral segment   left hepatic lobe measuring 2.4 x 1.3 cm on series 2 image 40. A second   smaller fat-containing lesion is present more peripherally in the left   hepatic lobe measuring 5 mm.  BILE DUCTS: Normal caliber.  GALLBLADDER: Within normal limits.  SPLEEN: Within normal limits.  PANCREAS: Within normal limits.  ADRENALS: Mildly thickened adrenal glands without focal mass.  KIDNEYS/URETERS: Renal vascular calcifications. No hydronephrosis or   calculi.    BLADDER: Within normal limits.  REPRODUCTIVE ORGANS: The uterus measures 8.1 x 8.4 x 7.3 cm. There is a   5.4 cm anterior mid to lower subserosal partially calcified myoma.    BOWEL: No bowel obstruction. Appendix is normal.  PERITONEUM: No ascites.  VESSELS:  Neck or calcified  RETROPERITONEUM: No lymphadenopathy.    ABDOMINAL WALL: Small fat-containing umbilical hernia.  BONES: Mild thoracolumbar degenerative changes. Grade 1 L4-5   anterolisthesis with disc bulging and facet and ligamentum flavum   hypertrophy creating severe spinal stenosis..    IMPRESSION:     Evaluation of the solid organs and GI tract is limited without oral and   IV contrast.    2 hepatic lesions in the lateral segment left hepatic lobe containing   macroscopic fat. Consider hepatocellular carcinoma if patient is   cirrhotic. Otherwise, angiomyolipoma or an FNH could be considered. Other   etiologies are not excluded. Pacemaker precludes MRI. Recommend   contrast-enhanced multiphase CT with liver protocol.    No retroperitoneal hematoma or intra-abdominal hemorrhage.    Bilateral pleural effusions. Pacemaker.    Fibroid uterus.    Severe acquired L4-5 spinal stenosis.      Imaging Personally Reviewed:  [ x ] YES  [ ] NO    Consultant(s) Notes Reviewed:  [ x ] YES  [ ] NO

## 2019-06-18 NOTE — PROGRESS NOTE ADULT - PROBLEM SELECTOR PLAN 5
-Stent placed in Nov, 2019  -c/w asa and brillinta therapy

## 2019-06-18 NOTE — PROGRESS NOTE ADULT - ASSESSMENT
Pt is a 73F, from home, w/ a PMHx of HTN, HLD, DM, CHF (EF 20%) w/ ICD in left chest, and CAD with stent placed Feb 2019 who presents to the ED with abdominal pain x 1 week.  In the ED, pt presents in no acute distress, vitals WNL, and EKG sinus w/ ST depressions in 1, V1, V5, V6.  Pt remains afebrile w/o leukocytosis.  Remaining labs sig creat of 1.66, and trop of 2.6- last trop at [facility] recorded was 1.      Pt will be admitted to [facility] for management of NSTEMI     GOC: DNR/ DNI  HCP: Gena Maldonado [phone]
Pt is a 73F, from home, w/ a PMHx of HTN, HLD, DM, CHF (EF 20%) w/ ICD in left chest, and CAD with stent placed Feb 2019 who presents to the ED with abdominal pain x 1 week.  In the ED, pt presents in no acute distress, vitals WNL, and EKG sinus w/ ST depressions in 1, V1, V5, V6.  Pt remains afebrile w/o leukocytosis.  Remaining labs sig creat of 1.66, and trop of 2.6- last trop at [facility] recorded was 1.      Pt will be admitted to [facility] for management of NSTEMI     GOC: DNR/ DNI  HCP: Gena Maldonado [phone]
Pt is a 73F, from home, w/ a PMHx of HTN, HLD, DM, CHF (EF 20%) w/ ICD in left chest, and CAD with stent placed Feb 2019 who presents to the ED with abdominal pain x 1 week.  In the ED, pt presents in no acute distress, vitals WNL, and EKG sinus w/ ST depressions in 1, V1, V5, V6.  Pt remains afebrile w/o leukocytosis.  Remaining labs sig creat of 1.66, and trop of 2.6- last trop at [facility] recorded was 1.      Pt will be admitted to [facility] for management of NSTEMI     GOC: DNR/ DNI  HCP: Gena Maldonado [phone]

## 2019-06-18 NOTE — PROGRESS NOTE ADULT - PROBLEM SELECTOR PLAN 4
-elevated creat post stent placement- unknown baseline  -Pt sees OP nephro- Dr. Arnold
-elevated creat post stent placement- unknown baseline  -Pt sees OP nephro- Dr. Arnold   -managing team to contact OP nephro for baseline creat
-elevated creat post stent placement- unknown baseline  -Pt sees OP nephro- Dr. Arnold   -managing team to contact OP nephro for baseline creat

## 2019-06-18 NOTE — PROGRESS NOTE ADULT - PROBLEM SELECTOR PLAN 6
-c/w metoprolol therapy for now  -resume lasix, hold ace with concern for renal dysfunction

## 2019-06-18 NOTE — PROGRESS NOTE ADULT - PROBLEM SELECTOR PLAN 3
-On Lantus 35u HS, and SS TID  -decrease lantus to 25u qhs and humalog 3u premeal; continue hss  HbA1c 15.1  -Diabetic DIet
-On Lantus 35u HS, and SS TID  -will continue Lantus 20HS w/ sliding scale AC HS for now, uptitrate as clinically indicated   -f/u HbA1c  -Diabetic DIet
-On Lantus 35u HS, and SS TID  -increase lantus to 30u, humalog 8u premeal; continue hss  HbA1c 15.1  -Diabetic DIet

## 2019-06-19 ENCOUNTER — INPATIENT (INPATIENT)
Facility: HOSPITAL | Age: 74
LOS: 10 days | Discharge: HOME | End: 2019-06-30
Attending: INTERNAL MEDICINE | Admitting: INTERNAL MEDICINE
Payer: MEDICARE

## 2019-06-19 VITALS — WEIGHT: 144.84 LBS

## 2019-06-19 VITALS
OXYGEN SATURATION: 100 % | HEIGHT: 63 IN | TEMPERATURE: 97 F | WEIGHT: 137.57 LBS | HEART RATE: 85 BPM | RESPIRATION RATE: 16 BRPM

## 2019-06-19 DIAGNOSIS — K76.9 LIVER DISEASE, UNSPECIFIED: ICD-10-CM

## 2019-06-19 DIAGNOSIS — Z95.810 PRESENCE OF AUTOMATIC (IMPLANTABLE) CARDIAC DEFIBRILLATOR: Chronic | ICD-10-CM

## 2019-06-19 DIAGNOSIS — I21.4 NON-ST ELEVATION (NSTEMI) MYOCARDIAL INFARCTION: ICD-10-CM

## 2019-06-19 DIAGNOSIS — I50.9 HEART FAILURE, UNSPECIFIED: ICD-10-CM

## 2019-06-19 DIAGNOSIS — N17.9 ACUTE KIDNEY FAILURE, UNSPECIFIED: ICD-10-CM

## 2019-06-19 DIAGNOSIS — D64.9 ANEMIA, UNSPECIFIED: ICD-10-CM

## 2019-06-19 DIAGNOSIS — E11.9 TYPE 2 DIABETES MELLITUS WITHOUT COMPLICATIONS: ICD-10-CM

## 2019-06-19 DIAGNOSIS — I82.90 ACUTE EMBOLISM AND THROMBOSIS OF UNSPECIFIED VEIN: ICD-10-CM

## 2019-06-19 DIAGNOSIS — I25.10 ATHEROSCLEROTIC HEART DISEASE OF NATIVE CORONARY ARTERY WITHOUT ANGINA PECTORIS: ICD-10-CM

## 2019-06-19 DIAGNOSIS — E78.5 HYPERLIPIDEMIA, UNSPECIFIED: ICD-10-CM

## 2019-06-19 DIAGNOSIS — I10 ESSENTIAL (PRIMARY) HYPERTENSION: ICD-10-CM

## 2019-06-19 LAB
ALBUMIN SERPL ELPH-MCNC: 2.9 G/DL — LOW (ref 3.3–5)
ALP SERPL-CCNC: 102 U/L — SIGNIFICANT CHANGE UP (ref 40–120)
ALT FLD-CCNC: 15 U/L — SIGNIFICANT CHANGE UP (ref 4–33)
ANION GAP SERPL CALC-SCNC: 13 MMO/L — SIGNIFICANT CHANGE UP (ref 7–14)
ANION GAP SERPL CALC-SCNC: 9 MMOL/L — SIGNIFICANT CHANGE UP (ref 5–17)
APTT BLD: 31.9 SEC — SIGNIFICANT CHANGE UP (ref 27.5–36.3)
AST SERPL-CCNC: 18 U/L — SIGNIFICANT CHANGE UP (ref 4–32)
BASE EXCESS BLDA CALC-SCNC: -1.1 MMOL/L — SIGNIFICANT CHANGE UP
BASE EXCESS BLDV CALC-SCNC: 0.6 MMOL/L — SIGNIFICANT CHANGE UP
BILIRUB SERPL-MCNC: 0.4 MG/DL — SIGNIFICANT CHANGE UP (ref 0.2–1.2)
BUN SERPL-MCNC: 49 MG/DL — HIGH (ref 7–23)
BUN SERPL-MCNC: 54 MG/DL — HIGH (ref 7–18)
CA-I BLDA-SCNC: 1.19 MMOL/L — SIGNIFICANT CHANGE UP (ref 1.15–1.29)
CALCIUM SERPL-MCNC: 8.3 MG/DL — LOW (ref 8.4–10.5)
CALCIUM SERPL-MCNC: 8.6 MG/DL — SIGNIFICANT CHANGE UP (ref 8.4–10.5)
CHLORIDE SERPL-SCNC: 102 MMOL/L — SIGNIFICANT CHANGE UP (ref 96–108)
CHLORIDE SERPL-SCNC: 104 MMOL/L — SIGNIFICANT CHANGE UP (ref 98–107)
CO2 SERPL-SCNC: 21 MMOL/L — LOW (ref 22–31)
CO2 SERPL-SCNC: 22 MMOL/L — SIGNIFICANT CHANGE UP (ref 22–31)
CREAT SERPL-MCNC: 1.78 MG/DL — HIGH (ref 0.5–1.3)
CREAT SERPL-MCNC: 2.19 MG/DL — HIGH (ref 0.5–1.3)
GLUCOSE BLDA-MCNC: 96 MG/DL — SIGNIFICANT CHANGE UP (ref 70–99)
GLUCOSE BLDC GLUCOMTR-MCNC: 175 MG/DL — HIGH (ref 70–99)
GLUCOSE BLDC GLUCOMTR-MCNC: 216 MG/DL — HIGH (ref 70–99)
GLUCOSE BLDC GLUCOMTR-MCNC: 89 MG/DL — SIGNIFICANT CHANGE UP (ref 70–99)
GLUCOSE BLDC GLUCOMTR-MCNC: 98 MG/DL — SIGNIFICANT CHANGE UP (ref 70–99)
GLUCOSE SERPL-MCNC: 112 MG/DL — HIGH (ref 70–99)
GLUCOSE SERPL-MCNC: 91 MG/DL — SIGNIFICANT CHANGE UP (ref 70–99)
HCO3 BLDA-SCNC: 24 MMOL/L — SIGNIFICANT CHANGE UP (ref 22–26)
HCO3 BLDV-SCNC: 24 MMOL/L — SIGNIFICANT CHANGE UP (ref 20–27)
HCT VFR BLD CALC: 25.3 % — LOW (ref 34.5–45)
HCT VFR BLD CALC: 27.6 % — LOW (ref 34.5–45)
HCT VFR BLDA CALC: 25 % — LOW (ref 34.5–46.5)
HGB BLD-MCNC: 8.3 G/DL — LOW (ref 11.5–15.5)
HGB BLD-MCNC: 8.9 G/DL — LOW (ref 11.5–15.5)
HGB BLDA-MCNC: 8 G/DL — LOW (ref 11.5–15.5)
INR BLD: 1.1 — SIGNIFICANT CHANGE UP (ref 0.88–1.17)
LACTATE BLDA-SCNC: 1 MMOL/L — SIGNIFICANT CHANGE UP (ref 0.5–2)
MAGNESIUM SERPL-MCNC: 2.6 MG/DL — SIGNIFICANT CHANGE UP (ref 1.6–2.6)
MCHC RBC-ENTMCNC: 26.7 PG — LOW (ref 27–34)
MCHC RBC-ENTMCNC: 26.7 PG — LOW (ref 27–34)
MCHC RBC-ENTMCNC: 32.2 GM/DL — SIGNIFICANT CHANGE UP (ref 32–36)
MCHC RBC-ENTMCNC: 32.8 % — SIGNIFICANT CHANGE UP (ref 32–36)
MCV RBC AUTO: 81.4 FL — SIGNIFICANT CHANGE UP (ref 80–100)
MCV RBC AUTO: 82.9 FL — SIGNIFICANT CHANGE UP (ref 80–100)
NRBC # BLD: 1 /100 WBCS — HIGH (ref 0–0)
NRBC # FLD: 0.09 K/UL — SIGNIFICANT CHANGE UP (ref 0–0)
PCO2 BLDA: 36 MMHG — SIGNIFICANT CHANGE UP (ref 32–48)
PCO2 BLDV: 45 MMHG — SIGNIFICANT CHANGE UP (ref 41–51)
PH BLDA: 7.42 PH — SIGNIFICANT CHANGE UP (ref 7.35–7.45)
PH BLDV: 7.37 PH — SIGNIFICANT CHANGE UP (ref 7.32–7.43)
PHOSPHATE SERPL-MCNC: 4.4 MG/DL — SIGNIFICANT CHANGE UP (ref 2.5–4.5)
PLATELET # BLD AUTO: 271 K/UL — SIGNIFICANT CHANGE UP (ref 150–400)
PLATELET # BLD AUTO: 296 K/UL — SIGNIFICANT CHANGE UP (ref 150–400)
PMV BLD: 11.5 FL — SIGNIFICANT CHANGE UP (ref 7–13)
PO2 BLDA: 114 MMHG — HIGH (ref 83–108)
PO2 BLDV: 33 MMHG — LOW (ref 35–40)
POTASSIUM BLDA-SCNC: 4.7 MMOL/L — HIGH (ref 3.4–4.5)
POTASSIUM SERPL-MCNC: 4.8 MMOL/L — SIGNIFICANT CHANGE UP (ref 3.5–5.3)
POTASSIUM SERPL-MCNC: 4.9 MMOL/L — SIGNIFICANT CHANGE UP (ref 3.5–5.3)
POTASSIUM SERPL-SCNC: 4.8 MMOL/L — SIGNIFICANT CHANGE UP (ref 3.5–5.3)
POTASSIUM SERPL-SCNC: 4.9 MMOL/L — SIGNIFICANT CHANGE UP (ref 3.5–5.3)
PROT SERPL-MCNC: 6.6 G/DL — SIGNIFICANT CHANGE UP (ref 6–8.3)
PROTHROM AB SERPL-ACNC: 12.3 SEC — SIGNIFICANT CHANGE UP (ref 9.8–13.1)
RBC # BLD: 3.11 M/UL — LOW (ref 3.8–5.2)
RBC # BLD: 3.33 M/UL — LOW (ref 3.8–5.2)
RBC # FLD: 13.5 % — SIGNIFICANT CHANGE UP (ref 10.3–14.5)
RBC # FLD: 13.6 % — SIGNIFICANT CHANGE UP (ref 10.3–14.5)
SAO2 % BLDA: 97.8 % — SIGNIFICANT CHANGE UP (ref 95–99)
SAO2 % BLDV: 55.1 % — LOW (ref 60–85)
SODIUM BLDA-SCNC: 136 MMOL/L — SIGNIFICANT CHANGE UP (ref 136–146)
SODIUM SERPL-SCNC: 133 MMOL/L — LOW (ref 135–145)
SODIUM SERPL-SCNC: 138 MMOL/L — SIGNIFICANT CHANGE UP (ref 135–145)
WBC # BLD: 10.08 K/UL — SIGNIFICANT CHANGE UP (ref 3.8–10.5)
WBC # BLD: 10.33 K/UL — SIGNIFICANT CHANGE UP (ref 3.8–10.5)
WBC # FLD AUTO: 10.08 K/UL — SIGNIFICANT CHANGE UP (ref 3.8–10.5)
WBC # FLD AUTO: 10.33 K/UL — SIGNIFICANT CHANGE UP (ref 3.8–10.5)

## 2019-06-19 PROCEDURE — 86803 HEPATITIS C AB TEST: CPT

## 2019-06-19 PROCEDURE — 80048 BASIC METABOLIC PNL TOTAL CA: CPT

## 2019-06-19 PROCEDURE — 93460 R&L HRT ART/VENTRICLE ANGIO: CPT | Mod: 26

## 2019-06-19 PROCEDURE — 83690 ASSAY OF LIPASE: CPT

## 2019-06-19 PROCEDURE — 82553 CREATINE MB FRACTION: CPT

## 2019-06-19 PROCEDURE — 83735 ASSAY OF MAGNESIUM: CPT

## 2019-06-19 PROCEDURE — 80053 COMPREHEN METABOLIC PANEL: CPT

## 2019-06-19 PROCEDURE — 86850 RBC ANTIBODY SCREEN: CPT

## 2019-06-19 PROCEDURE — 99285 EMERGENCY DEPT VISIT HI MDM: CPT | Mod: 25

## 2019-06-19 PROCEDURE — 93010 ELECTROCARDIOGRAM REPORT: CPT

## 2019-06-19 PROCEDURE — 80061 LIPID PANEL: CPT

## 2019-06-19 PROCEDURE — 86707 HEPATITIS BE ANTIBODY: CPT

## 2019-06-19 PROCEDURE — 74176 CT ABD & PELVIS W/O CONTRAST: CPT

## 2019-06-19 PROCEDURE — 83036 HEMOGLOBIN GLYCOSYLATED A1C: CPT

## 2019-06-19 PROCEDURE — 84484 ASSAY OF TROPONIN QUANT: CPT

## 2019-06-19 PROCEDURE — 93005 ELECTROCARDIOGRAM TRACING: CPT

## 2019-06-19 PROCEDURE — 85730 THROMBOPLASTIN TIME PARTIAL: CPT

## 2019-06-19 PROCEDURE — 84100 ASSAY OF PHOSPHORUS: CPT

## 2019-06-19 PROCEDURE — 84443 ASSAY THYROID STIM HORMONE: CPT

## 2019-06-19 PROCEDURE — 86708 HEPATITIS A ANTIBODY: CPT

## 2019-06-19 PROCEDURE — 86709 HEPATITIS A IGM ANTIBODY: CPT

## 2019-06-19 PROCEDURE — 82607 VITAMIN B-12: CPT

## 2019-06-19 PROCEDURE — 86706 HEP B SURFACE ANTIBODY: CPT

## 2019-06-19 PROCEDURE — 93306 TTE W/DOPPLER COMPLETE: CPT

## 2019-06-19 PROCEDURE — 74018 RADEX ABDOMEN 1 VIEW: CPT

## 2019-06-19 PROCEDURE — 36415 COLL VENOUS BLD VENIPUNCTURE: CPT

## 2019-06-19 PROCEDURE — 86900 BLOOD TYPING SEROLOGIC ABO: CPT

## 2019-06-19 PROCEDURE — 33967 INSERT I-AORT PERCUT DEVICE: CPT

## 2019-06-19 PROCEDURE — 71045 X-RAY EXAM CHEST 1 VIEW: CPT | Mod: 26

## 2019-06-19 PROCEDURE — 71046 X-RAY EXAM CHEST 2 VIEWS: CPT

## 2019-06-19 PROCEDURE — 76937 US GUIDE VASCULAR ACCESS: CPT | Mod: 26

## 2019-06-19 PROCEDURE — 87350 HEPATITIS BE AG IA: CPT

## 2019-06-19 PROCEDURE — 82962 GLUCOSE BLOOD TEST: CPT

## 2019-06-19 PROCEDURE — 82550 ASSAY OF CK (CPK): CPT

## 2019-06-19 PROCEDURE — 85610 PROTHROMBIN TIME: CPT

## 2019-06-19 PROCEDURE — 85027 COMPLETE CBC AUTOMATED: CPT

## 2019-06-19 PROCEDURE — 86704 HEP B CORE ANTIBODY TOTAL: CPT

## 2019-06-19 PROCEDURE — 86901 BLOOD TYPING SEROLOGIC RH(D): CPT

## 2019-06-19 RX ORDER — SENNA PLUS 8.6 MG/1
2 TABLET ORAL
Qty: 0 | Refills: 0 | DISCHARGE
Start: 2019-06-19

## 2019-06-19 RX ORDER — GLUCAGON INJECTION, SOLUTION 0.5 MG/.1ML
1 INJECTION, SOLUTION SUBCUTANEOUS ONCE
Refills: 0 | Status: DISCONTINUED | OUTPATIENT
Start: 2019-06-19 | End: 2019-06-30

## 2019-06-19 RX ORDER — INSULIN LISPRO 100/ML
VIAL (ML) SUBCUTANEOUS
Refills: 0 | Status: DISCONTINUED | OUTPATIENT
Start: 2019-06-19 | End: 2019-06-30

## 2019-06-19 RX ORDER — INSULIN GLARGINE 100 [IU]/ML
25 INJECTION, SOLUTION SUBCUTANEOUS AT BEDTIME
Refills: 0 | Status: DISCONTINUED | OUTPATIENT
Start: 2019-06-19 | End: 2019-06-22

## 2019-06-19 RX ORDER — SODIUM CHLORIDE 9 MG/ML
3 INJECTION INTRAMUSCULAR; INTRAVENOUS; SUBCUTANEOUS EVERY 8 HOURS
Refills: 0 | Status: DISCONTINUED | OUTPATIENT
Start: 2019-06-19 | End: 2019-06-30

## 2019-06-19 RX ORDER — CHLORHEXIDINE GLUCONATE 213 G/1000ML
1 SOLUTION TOPICAL
Refills: 0 | Status: DISCONTINUED | OUTPATIENT
Start: 2019-06-19 | End: 2019-06-30

## 2019-06-19 RX ORDER — SODIUM CHLORIDE 9 MG/ML
1000 INJECTION, SOLUTION INTRAVENOUS
Refills: 0 | Status: DISCONTINUED | OUTPATIENT
Start: 2019-06-19 | End: 2019-06-30

## 2019-06-19 RX ORDER — DEXTROSE 50 % IN WATER 50 %
15 SYRINGE (ML) INTRAVENOUS ONCE
Refills: 0 | Status: DISCONTINUED | OUTPATIENT
Start: 2019-06-19 | End: 2019-06-30

## 2019-06-19 RX ORDER — SUCRALFATE 1 G
1 TABLET ORAL
Qty: 0 | Refills: 0 | DISCHARGE
Start: 2019-06-19

## 2019-06-19 RX ORDER — DEXTROSE 50 % IN WATER 50 %
25 SYRINGE (ML) INTRAVENOUS ONCE
Refills: 0 | Status: DISCONTINUED | OUTPATIENT
Start: 2019-06-19 | End: 2019-06-30

## 2019-06-19 RX ORDER — DOCUSATE SODIUM 100 MG
100 CAPSULE ORAL
Refills: 0 | Status: DISCONTINUED | OUTPATIENT
Start: 2019-06-19 | End: 2019-06-30

## 2019-06-19 RX ORDER — INSULIN LISPRO 100/ML
VIAL (ML) SUBCUTANEOUS AT BEDTIME
Refills: 0 | Status: DISCONTINUED | OUTPATIENT
Start: 2019-06-19 | End: 2019-06-30

## 2019-06-19 RX ORDER — SENNA PLUS 8.6 MG/1
2 TABLET ORAL AT BEDTIME
Refills: 0 | Status: DISCONTINUED | OUTPATIENT
Start: 2019-06-19 | End: 2019-06-30

## 2019-06-19 RX ORDER — NITROGLYCERIN 6.5 MG
20 CAPSULE, EXTENDED RELEASE ORAL
Qty: 50 | Refills: 0 | Status: DISCONTINUED | OUTPATIENT
Start: 2019-06-19 | End: 2019-06-20

## 2019-06-19 RX ORDER — DEXTROSE 50 % IN WATER 50 %
12.5 SYRINGE (ML) INTRAVENOUS ONCE
Refills: 0 | Status: DISCONTINUED | OUTPATIENT
Start: 2019-06-19 | End: 2019-06-30

## 2019-06-19 RX ORDER — ATORVASTATIN CALCIUM 80 MG/1
40 TABLET, FILM COATED ORAL AT BEDTIME
Refills: 0 | Status: DISCONTINUED | OUTPATIENT
Start: 2019-06-19 | End: 2019-06-28

## 2019-06-19 RX ORDER — TICAGRELOR 90 MG/1
90 TABLET ORAL EVERY 12 HOURS
Refills: 0 | Status: DISCONTINUED | OUTPATIENT
Start: 2019-06-19 | End: 2019-06-30

## 2019-06-19 RX ORDER — FUROSEMIDE 40 MG
10 TABLET ORAL
Qty: 500 | Refills: 0 | Status: DISCONTINUED | OUTPATIENT
Start: 2019-06-19 | End: 2019-06-20

## 2019-06-19 RX ORDER — ASPIRIN/CALCIUM CARB/MAGNESIUM 324 MG
81 TABLET ORAL DAILY
Refills: 0 | Status: DISCONTINUED | OUTPATIENT
Start: 2019-06-19 | End: 2019-06-30

## 2019-06-19 RX ORDER — PANTOPRAZOLE SODIUM 20 MG/1
40 TABLET, DELAYED RELEASE ORAL
Refills: 0 | Status: DISCONTINUED | OUTPATIENT
Start: 2019-06-19 | End: 2019-06-30

## 2019-06-19 RX ORDER — DOCUSATE SODIUM 100 MG
1 CAPSULE ORAL
Qty: 0 | Refills: 0 | DISCHARGE
Start: 2019-06-19

## 2019-06-19 RX ORDER — FUROSEMIDE 40 MG
40 TABLET ORAL ONCE
Refills: 0 | Status: COMPLETED | OUTPATIENT
Start: 2019-06-19 | End: 2019-06-19

## 2019-06-19 RX ORDER — ASPIRIN/CALCIUM CARB/MAGNESIUM 324 MG
1 TABLET ORAL
Qty: 0 | Refills: 0 | DISCHARGE

## 2019-06-19 RX ADMIN — ATORVASTATIN CALCIUM 40 MILLIGRAM(S): 80 TABLET, FILM COATED ORAL at 21:39

## 2019-06-19 RX ADMIN — Medication 4: at 12:16

## 2019-06-19 RX ADMIN — Medication 650 MILLIGRAM(S): at 05:28

## 2019-06-19 RX ADMIN — Medication 40 MILLIGRAM(S): at 21:39

## 2019-06-19 RX ADMIN — INSULIN GLARGINE 25 UNIT(S): 100 INJECTION, SOLUTION SUBCUTANEOUS at 21:59

## 2019-06-19 RX ADMIN — Medication 1 GRAM(S): at 05:26

## 2019-06-19 RX ADMIN — TRAMADOL HYDROCHLORIDE 25 MILLIGRAM(S): 50 TABLET ORAL at 08:23

## 2019-06-19 RX ADMIN — Medication 3 UNIT(S): at 12:17

## 2019-06-19 RX ADMIN — Medication 5 MG/HR: at 21:39

## 2019-06-19 RX ADMIN — Medication 650 MILLIGRAM(S): at 05:27

## 2019-06-19 RX ADMIN — Medication 1 GRAM(S): at 00:21

## 2019-06-19 RX ADMIN — TICAGRELOR 90 MILLIGRAM(S): 90 TABLET ORAL at 05:27

## 2019-06-19 RX ADMIN — Medication 100 MILLIGRAM(S): at 05:26

## 2019-06-19 RX ADMIN — Medication 81 MILLIGRAM(S): at 12:16

## 2019-06-19 RX ADMIN — SENNA PLUS 2 TABLET(S): 8.6 TABLET ORAL at 21:39

## 2019-06-19 RX ADMIN — Medication 81 MILLIGRAM(S): at 21:51

## 2019-06-19 RX ADMIN — SODIUM CHLORIDE 3 MILLILITER(S): 9 INJECTION INTRAMUSCULAR; INTRAVENOUS; SUBCUTANEOUS at 21:40

## 2019-06-19 RX ADMIN — POLYETHYLENE GLYCOL 3350 17 GRAM(S): 17 POWDER, FOR SOLUTION ORAL at 12:16

## 2019-06-19 RX ADMIN — TICAGRELOR 90 MILLIGRAM(S): 90 TABLET ORAL at 21:51

## 2019-06-19 RX ADMIN — Medication 1 GRAM(S): at 12:16

## 2019-06-19 RX ADMIN — PANTOPRAZOLE SODIUM 40 MILLIGRAM(S): 20 TABLET, DELAYED RELEASE ORAL at 05:26

## 2019-06-19 NOTE — CONSULT NOTE ADULT - PROBLEM SELECTOR RECOMMENDATION 10
she will be on a heparin gtt for balloon pump 1/:, this will also be sufficient for VTE prophylaxis.

## 2019-06-19 NOTE — CONSULT NOTE ADULT - PROBLEM SELECTOR RECOMMENDATION 9
Admit to CCU post cardia cath  Balloon pump 1:1  Nitro gtt titrate to MAP 65  continue ASA and Brilinta and lipitor 40  control BP with nitro gtt for now, will transition to PO antihypertensives  -patient continues to have chest pain, and nitro gtt is helping with symptoms. will continue lantus   25 units qhs, (home dose is 35units) and fingersticks QAC and QHS, as well as ISS

## 2019-06-19 NOTE — CHART NOTE - NSCHARTNOTEFT_GEN_A_CORE
Upon Nutritional Assessment by the Registered Dietitian your patient was determined to meet criteria / has evidence of the following diagnosis/diagnoses:          [ ]  Mild Protein Calorie Malnutrition        [ X ]  Moderate Protein Calorie Malnutrition        [ ] Severe Protein Calorie Malnutrition        [ ] Unspecified Protein Calorie Malnutrition        [ ] Underweight / BMI <19        [ ] Morbid Obesity / BMI > 40      Findings as based on:  •  Comprehensive nutrition assessment and consultation  •  Calorie counts (nutrient intake analysis)  •  Food acceptance and intake status from observations by staff  •  Follow up  •  Patient education  •  Intervention secondary to interdisciplinary rounds  •   concerns      Treatment:    The following diet has been recommended: Add Glucerna Shake 1can bid as medically feasible (440kcal, 20g protein)       PROVIDER Section:     By signing this assessment you are acknowledging and agree with the diagnosis/diagnoses assigned by the Registered Dietitian    Comments:

## 2019-06-19 NOTE — DIETITIAN INITIAL EVALUATION ADULT. - PERTINENT MEDS FT
reviewed   MEDICATIONS  (STANDING):  amLODIPine   Tablet 2.5 milliGRAM(s) Oral daily  aspirin enteric coated 81 milliGRAM(s) Oral daily  atorvastatin 40 milliGRAM(s) Oral at bedtime  docusate sodium 100 milliGRAM(s) Oral two times a day  furosemide    Tablet 20 milliGRAM(s) Oral daily  insulin glargine Injectable (LANTUS) 25 Unit(s) SubCutaneous at bedtime  insulin lispro (HumaLOG) corrective regimen sliding scale   SubCutaneous three times a day before meals  insulin lispro (HumaLOG) corrective regimen sliding scale   SubCutaneous at bedtime  insulin lispro Injectable (HumaLOG) 3 Unit(s) SubCutaneous three times a day before meals  metoprolol tartrate 12.5 milliGRAM(s) Oral two times a day  pantoprazole    Tablet 40 milliGRAM(s) Oral before breakfast  polyethylene glycol 3350 17 Gram(s) Oral daily  senna 2 Tablet(s) Oral at bedtime  sucralfate 1 Gram(s) Oral four times a day  ticagrelor 90 milliGRAM(s) Oral two times a day    MEDICATIONS  (PRN):  acetaminophen   Tablet .. 650 milliGRAM(s) Oral every 6 hours PRN Temp greater or equal to 38C (100.4F), Mild Pain (1 - 3)  acetaminophen  IVPB .. 1000 milliGRAM(s) IV Intermittent once PRN Moderate Pain (4 - 6)  aluminum hydroxide/magnesium hydroxide/simethicone Suspension 30 milliLiter(s) Oral every 6 hours PRN Dyspepsia  glucagon  Injectable 1 milliGRAM(s) IntraMuscular once PRN Glucose <70 milliGRAM(s)/deciLiter  ondansetron Injectable 4 milliGRAM(s) IV Push every 6 hours PRN Nausea and/or Vomiting  traMADol 25 milliGRAM(s) Oral every 8 hours PRN Moderate Pain (4 - 6)

## 2019-06-19 NOTE — DIETITIAN INITIAL EVALUATION ADULT. - PROBLEM SELECTOR PLAN 3
-On Lantus 35u HS, and SS TID  -will continue Lantus 20HS w/ sliding scale AC HS for now, uptitrate as clinically indicated   -f/u HbA1c  -Diabetic DIet per MD

## 2019-06-19 NOTE — DIETITIAN INITIAL EVALUATION ADULT. - PROBLEM SELECTOR PLAN 1
-epigastric pain in setting of elevated trop of 2.6  -EKG sinus w/ ST depressions in 1, V1, V5, V6  -EKG findings may be 2/2 to pt's cardiomyopathy per cardio  -Recent echo performed this week at [facility] sig for EF of 20% w/ pulm htn, and recent stress test sig for fixed defects to apical and septal walls  -will start treatment with heparin drip for now   -continue to trend trops- T2@ 6p, and T3@ midnight   -serial EKG's  -c/w asa, statin, and BB  -f/u repeat echo  -Cardio: Dr. Hood per MD

## 2019-06-19 NOTE — CONSULT NOTE ADULT - ASSESSMENT
73 year old female with HTN, hyperlipidemia, DM-II, CHF (EF 20%) w/ ICD in left chest, and CAD with multiple PTCA/stents, most recent Feb 2019 who presented to  ED 6/16/19 complaining of abdominal pain x 1 week.  EKG sinus w/ ST depressions in 1, V1, V5, V6.  Labs remarkable for NSTEMI with peak troponin 3.6, Cr 1.66, Admitted to [facility] for management of NSTEMI, started on heparin drip. She was noted to have RUTH on CKD with peak Cr 2.1 and anemia with h/h 8.7/27. Underwent a CT Abd/pelvis with 2 hepatic lesions in the lateral segment left hepatic lobe containing macroscopic fat. Consider hepatocellular carcinoma if patient is cirrhotic. Otherwise, angiomyolipoma or an FNH could be considered. Other etiologies are not excluded. Pacemaker precludes MRI. Recommend contrast-enhanced multiphase CT with liver protocol.  In light of patients CAD history, symptoms and abnormal noninvasive test findings there is high suspicion for CAD progression. Patient is now transferred to [facility] for a cardiac catheterization with possible PTCA/stent.   On arrival patient complained of 8/10 chest pain with EKG revealing lateral lead ischemia but no acute changes and stable from previous EKG. Symptoms resolved spontaneously without intervention and underwent cath.

## 2019-06-19 NOTE — TRANSFER ACCEPTANCE NOTE - ASSESSMENT
73 year old female with HTN, hyperlipidemia, DM-II, CHF (EF 20%) w/ ICD in left chest, and CAD with multiple PTCA/stents, most recent Feb 2019 who presented to  ED 6/16/19 complaining of abdominal pain x 1 week. R/I NSTEMI and transferred to [facility] 6/19 for cath.

## 2019-06-19 NOTE — DIETITIAN INITIAL EVALUATION ADULT. - OTHER INFO
nutrition assessment for length of stay;  lives home PTA; skin intact; denied GI distress, chewing or swallowing problem at present, food choices obtained, poor appetite, observed lunch 25% intake today, wants Glucerna Shake; wt loss per pt from 145 lb about 3 m ago to 134.9 lb upon admission; pt seen by RD 6/17/19 for HgbA1C=15.4, plan of transfer to [facility] changed by MD, pt very receptive to diet education and reported no further  DM medication adjusted down per MD due to decreased intake pt seen by RD 6/17/19 for HgbA1C=15.4, plan of transfer to [facility] changed by MD, pt very receptive to diet education and reinforcement, DM medication adjusted down per MD due to decreased intake; nutrition assessment today for length of stay;  lives home PTA; skin intact; denied GI distress, chewing or swallowing problem at present, food choices obtained, poor appetite, observed lunch 25% intake today, wants Glucerna Shake; wt loss per pt from 145 lb about 3 m ago to 134.9 lb upon admission; pt seen by RD 6/17/19 for HgbA1C=15.4, plan of transfer to [facility] changed by MD, pt very receptive to diet education and reinforcement, DM medication adjusted down per MD due to decreased intake; nutrition assessment today for length of stay;  lives home PTA; skin intact; denied GI distress, chewing or swallowing problem at present, food choices obtained, poor appetite, observed lunch 25% intake today, wants Glucerna Shake; wt loss per pt from 145 lb about 3 m ago to 134.9 lb upon admission;.

## 2019-06-19 NOTE — CONSULT NOTE ADULT - SUBJECTIVE AND OBJECTIVE BOX
Reason for CCU Consult:   Transfer to CCU post cardiac cath s/p stenting with balloon pump insertion  HISTORY OF PRESENT ILLNESS:  73 year old female with HTN, hyperlipidemia, DM-II, CHF (EF 20%) w/ ICD in left chest, and CAD with multiple PTCA/stents, most recent 2019 who presented to  ED 19 complaining of abdominal pain x 1 week.  EKG sinus w/ ST depressions in 1, V1, V5, V6.  Labs remarkable for NSTEMI with peak troponin 3.6, Cr 1.66, Admitted to [facility] for management of NSTEMI, started on heparin drip. She was noted to have RUTH on CKD with peak Cr 2.1 and anemia with h/h 8.7/27. Underwent a CT Abd/pelvis with 2 hepatic lesions in the lateral segment left hepatic lobe containing macroscopic fat. Consider hepatocellular carcinoma if patient is cirrhotic. Otherwise, angiomyolipoma or an FNH could be considered. Other etiologies are not excluded. Pacemaker precludes MRI. Recommend contrast-enhanced multiphase CT with liver protocol.  In light of patients CAD history, symptoms and abnormal noninvasive test findings there is high suspicion for CAD progression. Patient is now transferred to [facility] for a cardiac catheterization with possible PTCA/stent.   On arrival patient complained of 8/10 chest pain with EKG revealing lateral lead ischemia but no acute changes and stable from previous EKG. Symptoms resolved spontaneously without intervention and underwent cath.       Allergies    No Known Allergies    Intolerances        PAST MEDICAL & SURGICAL HISTORY:  Liver lesion  Stented coronary artery: 2011 at [facility]  2019 [facility]  NSTEMI (non-ST elevated myocardial infarction)  Diabetes mellitus type 2 in nonobese  Constipation  Anemia  RUTH (acute kidney injury)  CAD (coronary artery disease)  Chronic CHF: Systolic EF 20%  Hyperlipidemia, unspecified hyperlipidemia type  Hypertension, unspecified type  Implantable cardioverter-defibrillator (ICD) in situ      MEDICATIONS  (STANDING):  sodium chloride 0.9% lock flush 3 milliLiter(s) IV Push every 8 hours    MEDICATIONS  (PRN):      Drug Dosing Weight  Height (cm): 157.5 (2019 15:41)  Weight (kg): 60.328 (2019 15:40)  BMI (kg/m2): 24.3 (2019 15:41)  BSA (m2): 1.61 (2019 15:41)    FAMILY HISTORY:  Family history of heart attack (Father):  76yo      SOCIAL HISTORY:  Smoker[ ]  Non smoker[ ]  Alcohol [ ]      ADVANCE DIRECTIVES:    REVIEW OF SYSTEMS:    CONSTITUTIONAL: No fevers, No chills, No fatigue, No weight gain  EYES: No vision changes   ENT: No congestion, No ear pain, No sore throat.  NECK: No pain, No stiffness  RESPIRATORY: No shortness of breath, No cough, No wheezing, No hemoptysis  CARDIOVASCULAR: No chest pain. No palpitations, No SCOTT, No orthopnea, No paroxysmal nocturnal dyspnea, No pleuritic pain  GASTROINTESTINAL: No abdominal pain, No nausea, No vomiting, No hematemesis, No diarrhea No constipation. No melena  GENITOURINARY: No dysuria, No frequency, No incontinence, No hematuria  NEUROLOGICAL: No dizziness, No lightheadedness, No syncope, No LOC, No headache, No numbness or weakness  EXTREMITIES: No Edema, No joint pain, No joint swelling.  PSYCHIATRIC: No anxiety, No depression  SKIN: No diaphoresis. No itching, No rashes, No pressure ulcers  ENDOCRINE: No Diabetes, No hypo/hyperthyroidism, No hyperparathyroid  HEME: No cancer, No anemia    All other review of systems is negative unless indicated above.    PHYSICAL EXAM:    Appearance: NAD, no distress  HEENT:   Normal oral mucosa, PERRL, EOMI  Cardiovascular: Regular rate and rhythm, Normal S1 S2, No JVD, No murmurs  Respiratory: Lungs clear to auscultation. No rales, No rhonchi, No wheezing. No tenderness to palpation  Gastrointestinal:  Soft, Non-tender, + BS  Neurologic: Non-focal, A&Ox3  Skin: Warm and dry, No rashes, No ecchymosis, No cyanosis  Extremities: No clubbing, No cyanosis, No edema  Vascular: Peripheral pulses palpable 2+ bilaterally  Psychiatry: Mood & affect appropriate      	    		            ICU Vital Signs Last 24 Hrs  T(C): 36.2 (2019 11:11), Max: 36.5 (2019 23:08)  T(F): 97.2 (2019 11:11), Max: 97.7 (2019 23:08)  HR: 77 (2019 11:11) (67 - 77)  BP: 114/62 (2019 11:11) (101/67 - 114/62)  BP(mean): --  ABP: --  ABP(mean): --  RR: 20 (2019 11:11) (17 - 20)  SpO2: 100% (2019 11:11) (100% - 100%)          LABS:  CBC Full  -  ( 2019 07:03 )  WBC Count : 10.33 K/uL  RBC Count : 3.33 M/uL  Hemoglobin : 8.9 g/dL  Hematocrit : 27.6 %  Platelet Count - Automated : 296 K/uL  Mean Cell Volume : 82.9 fl  Mean Cell Hemoglobin : 26.7 pg  Mean Cell Hemoglobin Concentration : 32.2 gm/dL  Auto Neutrophil # : x  Auto Lymphocyte # : x  Auto Monocyte # : x  Auto Eosinophil # : x  Auto Basophil # : x  Auto Neutrophil % : x  Auto Lymphocyte % : x  Auto Monocyte % : x  Auto Eosinophil % : x  Auto Basophil % : x    06-19    133<L>  |  102  |  54<H>  ----------------------------<  91  4.8   |  22  |  2.19<H>    Ca    8.3<L>      2019 07:03          CAPILLARY BLOOD GLUCOSE      POCT Blood Glucose.: 175 mg/dL (2019 15:13)    PTT - ( 2019 11:52 )  PTT:85.9 sec      I&O's Detail      EKG:    ECHO      RADIOLOGY STUDIES    CXR:     CT SCAN:     ULTRASOUND:     ASSESSMENT      PLAN          Case discussed with Cardiology fellow Reason for CCU Consult:   Transfer to CCU post cardiac cath s/p stenting with balloon pump insertion  HISTORY OF PRESENT ILLNESS:    Patient is a 73 year old female with PMH significant for CAD with multiple stents (most recent in 2019), systolic heart failure, EF 20% s/p ICD, HTN, HLD, DM, presented to [facility] on  with abdominal pain. In ER, patient had abnormal EKG and elevated troponin with an RUTH. Patient was admitted to [facility] with NSTEMI and ACS protocol initiated with heparin gtt. Due to elevated creatinine, cath was not performed urgently. Patient also with an anemia. [facility] also performed a CT Abd/pelvis and patient was found to have 2 hepatic lesions in the lateral segment of the left hepatic lobe, will need further testing to determine hepatic carcinoma vs. angiomyolipoma vs focal nodular hyperplasia (benign tumor). Pacemaker precludes MRI. Recommend contrast-enhanced multiphase CT with liver protocol. Patient was transferred to [facility] today for cardiac catheterization in  light of patients CAD history, symptoms and abnormal noninvasive test findings. In cath lab, patient received intra-aortic balloon pump and was transferred to CCU post cath for further managment.  Allergies    No Known Allergies    Intolerances        PAST MEDICAL & SURGICAL HISTORY:  Liver lesion  Stented coronary artery: 2011 at [facility]  2019 [facility]  NSTEMI (non-ST elevated myocardial infarction)  Diabetes mellitus type 2 in nonobese  Constipation  Anemia  RUTH (acute kidney injury)  CAD (coronary artery disease)  Chronic CHF: Systolic EF 20%  Hyperlipidemia, unspecified hyperlipidemia type  Hypertension, unspecified type  Implantable cardioverter-defibrillator (ICD) in situ        HOME MEDICATIONS:  amlodopine 2.5mg po qd  aspirin 81mg po qd  lipitor 40mg p oqd  brilinta 90mg po bid  coreg 3.125mg po bid  colace 100mg po bid  lasix 20mg po qd  lantus 35 units sq qhs  protonix 40mg po qd  carafate 1gram po q6 hours      Drug Dosing Weight  Height (cm): 157.5 (2019 15:41)  Weight (kg): 60.328 (2019 15:40)  BMI (kg/m2): 24.3 (2019 15:41)  BSA (m2): 1.61 (2019 15:41)    FAMILY HISTORY:  Family history of heart attack (Father):  74yo      SOCIAL HISTORY:  Smoker[ ]  Non smoker[ ]  Alcohol [ ]      ADVANCE DIRECTIVES:    REVIEW OF SYSTEMS:    CONSTITUTIONAL: No fevers, No chills, No fatigue, No weight gain  EYES: No vision changes   ENT: No congestion, No ear pain, No sore throat.  NECK: No pain, No stiffness  RESPIRATORY: No shortness of breath, No cough, No wheezing, No hemoptysis  CARDIOVASCULAR: No chest pain. No palpitations, No SCOTT, No orthopnea, No paroxysmal nocturnal dyspnea, No pleuritic pain  GASTROINTESTINAL: No abdominal pain, No nausea, No vomiting, No hematemesis, No diarrhea No constipation. No melena  GENITOURINARY: No dysuria, No frequency, No incontinence, No hematuria  NEUROLOGICAL: No dizziness, No lightheadedness, No syncope, No LOC, No headache, No numbness or weakness  EXTREMITIES: No Edema, No joint pain, No joint swelling.  PSYCHIATRIC: No anxiety, No depression  SKIN: No diaphoresis. No itching, No rashes, No pressure ulcers  ENDOCRINE: No Diabetes, No hypo/hyperthyroidism, No hyperparathyroid  HEME: No cancer, No anemia    All other review of systems is negative unless indicated above.    PHYSICAL EXAM:    Appearance: NAD, no distress  HEENT:   Normal oral mucosa, PERRL, EOMI  Cardiovascular: Regular rate and rhythm, Normal S1 S2, No JVD, No murmurs  Respiratory: Lungs clear to auscultation. No rales, No rhonchi, No wheezing. No tenderness to palpation  Gastrointestinal:  Soft, Non-tender, + BS  Neurologic: Non-focal, A&Ox3  Skin: Warm and dry, No rashes, No ecchymosis, No cyanosis  Extremities: No clubbing, No cyanosis, No edema  Vascular: Peripheral pulses palpable 2+ bilaterally  Psychiatry: Mood & affect appropriate      	    		            ICU Vital Signs Last 24 Hrs  T(C): 36.2 (2019 11:11), Max: 36.5 (2019 23:08)  T(F): 97.2 (2019 11:11), Max: 97.7 (2019 23:08)  HR: 77 (2019 11:11) (67 - 77)  BP: 114/62 (2019 11:11) (101/67 - 114/62)  BP(mean): --  ABP: --  ABP(mean): --  RR: 20 (2019 11:11) (17 - 20)  SpO2: 100% (2019 11:11) (100% - 100%)          LABS:  CBC Full  -  ( 2019 07:03 )  WBC Count : 10.33 K/uL  RBC Count : 3.33 M/uL  Hemoglobin : 8.9 g/dL  Hematocrit : 27.6 %  Platelet Count - Automated : 296 K/uL  Mean Cell Volume : 82.9 fl  Mean Cell Hemoglobin : 26.7 pg  Mean Cell Hemoglobin Concentration : 32.2 gm/dL  Auto Neutrophil # : x  Auto Lymphocyte # : x  Auto Monocyte # : x  Auto Eosinophil # : x  Auto Basophil # : x  Auto Neutrophil % : x  Auto Lymphocyte % : x  Auto Monocyte % : x  Auto Eosinophil % : x  Auto Basophil % : x    06-19    133<L>  |  102  |  54<H>  ----------------------------<  91  4.8   |  22  |  2.19<H>    Ca    8.3<L>      2019 07:03          CAPILLARY BLOOD GLUCOSE      POCT Blood Glucose.: 175 mg/dL (2019 15:13)    PTT - ( 2019 11:52 )  PTT:85.9 sec      I&O's Detail      EKG:    ECHO      RADIOLOGY STUDIES    CXR:     CT SCAN:     ULTRASOUND:     ASSESSMENT      PLAN          Case discussed with Cardiology fellow Reason for CCU Consult:   Transfer to CCU post cardiac cath s/p stenting with balloon pump insertion  HISTORY OF PRESENT ILLNESS:    Patient is a 73 year old female with PMH significant for CAD with multiple stents (most recent in 2019), systolic heart failure, EF 20% s/p ICD, HTN, HLD, DM, presented to [facility] on  with abdominal pain. In ER, patient had abnormal EKG and elevated troponin with an RUTH. Patient was admitted to [facility] with NSTEMI and ACS protocol initiated with heparin gtt. Due to elevated creatinine, cath was not performed urgently. Patient also with an anemia. [facility] also performed a CT Abd/pelvis and patient was found to have 2 hepatic lesions in the lateral segment of the left hepatic lobe, will need further testing to determine hepatic carcinoma vs. angiomyolipoma vs focal nodular hyperplasia (benign tumor). Pacemaker precludes MRI. At [facility], GI Recommend contrast-enhanced multiphase CT with liver protocol. Patient was transferred to [facility] today for diagnostic cardiac catheterization in  light of patients CAD history, symptoms and abnormal noninvasive test findings. In cath lab, patient was found to have 99% left main with 100% LAD, received intra-aortic balloon pump and was transferred to CCU post cath for further managment. Patient will possibly need a liver biopsy prior to stenting and committing to dual antiplatelet therapy, a GI work up will be needed  Allergies    No Known Allergies    Intolerances        PAST MEDICAL & SURGICAL HISTORY:  Liver lesion  Stented coronary artery: 2011 at [facility]  2019 [facility]  NSTEMI (non-ST elevated myocardial infarction)  Diabetes mellitus type 2 in nonobese  Constipation  Anemia  RUTH (acute kidney injury)  CAD (coronary artery disease)  Chronic CHF: Systolic EF 20%  Hyperlipidemia, unspecified hyperlipidemia type  Hypertension, unspecified type  Implantable cardioverter-defibrillator (ICD) in situ        HOME MEDICATIONS:  amlodopine 2.5mg po qd  aspirin 81mg po qd  lipitor 40mg p oqd  brilinta 90mg po bid  coreg 3.125mg po bid  colace 100mg po bid  lasix 20mg po qd  lantus 35 units sq qhs  protonix 40mg po qd  carafate 1gram po q6 hours      Drug Dosing Weight  Height (cm): 157.5 (2019 15:41)  Weight (kg): 60.328 (2019 15:40)  BMI (kg/m2): 24.3 (2019 15:41)  BSA (m2): 1.61 (2019 15:41)    FAMILY HISTORY:  Family history of heart attack (Father):  74yo      SOCIAL HISTORY:  Smoker[ ]  Non smoker[ ]  Alcohol [ ]      ADVANCE DIRECTIVES:    REVIEW OF SYSTEMS:    CONSTITUTIONAL: No fevers, No chills, No fatigue, No weight gain  EYES: No vision changes   ENT: No congestion, No ear pain, No sore throat.  NECK: No pain, No stiffness  RESPIRATORY: No shortness of breath, No cough, No wheezing, No hemoptysis  CARDIOVASCULAR: No chest pain. No palpitations, No SCOTT, No orthopnea, No paroxysmal nocturnal dyspnea, No pleuritic pain  GASTROINTESTINAL: No abdominal pain, No nausea, No vomiting, No hematemesis, No diarrhea No constipation. No melena  GENITOURINARY: No dysuria, No frequency, No incontinence, No hematuria  NEUROLOGICAL: No dizziness, No lightheadedness, No syncope, No LOC, No headache, No numbness or weakness  EXTREMITIES: No Edema, No joint pain, No joint swelling.  PSYCHIATRIC: No anxiety, No depression  SKIN: No diaphoresis. No itching, No rashes, No pressure ulcers  ENDOCRINE: No Diabetes, No hypo/hyperthyroidism, No hyperparathyroid  HEME: No cancer, No anemia    All other review of systems is negative unless indicated above.    PHYSICAL EXAM:    Appearance: NAD, no distress  HEENT:   Normal oral mucosa, PERRL, EOMI  Cardiovascular: Regular rate and rhythm, Normal S1 S2, No JVD, No murmurs  Respiratory: Lungs clear to auscultation. No rales, No rhonchi, No wheezing. No tenderness to palpation  Gastrointestinal:  Soft, Non-tender, + BS  Neurologic: Non-focal, A&Ox3  Skin: Warm and dry, No rashes, No ecchymosis, No cyanosis  Extremities: No clubbing, No cyanosis, No edema  Vascular: Peripheral pulses palpable 2+ bilaterally  Psychiatry: Mood & affect appropriate      	    		    ICU Vital Signs Last 24 Hrs  T(C): 36.2 (2019 11:11), Max: 36.5 (2019 23:08)  T(F): 97.2 (2019 11:11), Max: 97.7 (2019 23:08)  HR: 77 (2019 11:11) (67 - 77)  BP: 114/62 (2019 11:11) (101/67 - 114/62)  BP(mean): --  ABP: --  ABP(mean): --  RR: 20 (2019 11:11) (17 - 20)  SpO2: 100% (2019 11:11) (100% - 100%)          LABS:  CBC Full  -  ( 2019 07:03 )  WBC Count : 10.33 K/uL  RBC Count : 3.33 M/uL  Hemoglobin : 8.9 g/dL  Hematocrit : 27.6 %  Platelet Count - Automated : 296 K/uL  Mean Cell Volume : 82.9 fl  Mean Cell Hemoglobin : 26.7 pg  Mean Cell Hemoglobin Concentration : 32.2 gm/dL  Auto Neutrophil # : x  Auto Lymphocyte # : x  Auto Monocyte # : x  Auto Eosinophil # : x  Auto Basophil # : x  Auto Neutrophil % : x  Auto Lymphocyte % : x  Auto Monocyte % : x  Auto Eosinophil % : x  Auto Basophil % : x    06-19    133<L>  |  102  |  54<H>  ----------------------------<  91  4.8   |  22  |  2.19<H>    Ca    8.3<L>      2019 07:03          CAPILLARY BLOOD GLUCOSE      POCT Blood Glucose.: 175 mg/dL (2019 15:13)    PTT - ( 2019 11:52 )  PTT:85.9 sec      I&O's Detail      EKG:    ECHO      RADIOLOGY STUDIES    CXR:     CT SCAN:     ULTRASOUND:     ASSESSMENT      PLAN          Case discussed with Cardiology fellow Reason for CCU Consult:   Transfer to CCU post cardiac cath s/p stenting with balloon pump insertion  HISTORY OF PRESENT ILLNESS:    Patient is a 73 year old female with PMH significant for CAD with multiple stents (most recent in 2019), systolic heart failure, EF 20% s/p ICD, HTN, HLD, DM, presented to [facility] on  with abdominal pain. In ER, patient had abnormal EKG and elevated troponin with an RUTH. Patient was admitted to [facility] with NSTEMI and ACS protocol initiated with heparin gtt. Due to elevated creatinine, cath was not performed urgently. Patient also with an anemia. [facility] also performed a CT Abd/pelvis and patient was found to have 2 hepatic lesions in the lateral segment of the left hepatic lobe, will need further testing to determine hepatic carcinoma vs. angiomyolipoma vs focal nodular hyperplasia (benign tumor). Pacemaker precludes MRI. At [facility], GI Recommend contrast-enhanced multiphase CT with liver protocol. Patient was transferred to [facility] today for diagnostic cardiac catheterization in  light of patients CAD history, symptoms and abnormal noninvasive test findings. In cath lab, patient was found to have 99% left main with 100% LAD, received intra-aortic balloon pump and was transferred to CCU post cath for further managment. Patient will possibly need a liver biopsy prior to stenting and committing to dual antiplatelet therapy, a GI work up will be needed  Allergies    No Known Allergies    Intolerances        PAST MEDICAL & SURGICAL HISTORY:  Liver lesion  Stented coronary artery: 2011 at [facility]  2019 [facility]  NSTEMI (non-ST elevated myocardial infarction)  Diabetes mellitus type 2 in nonobese  Constipation  Anemia  RUTH (acute kidney injury)  CAD (coronary artery disease)  Chronic CHF: Systolic EF 20%  Hyperlipidemia, unspecified hyperlipidemia type  Hypertension, unspecified type  Implantable cardioverter-defibrillator (ICD) in situ        HOME MEDICATIONS:  amlodopine 2.5mg po qd  aspirin 81mg po qd  lipitor 40mg p oqd  brilinta 90mg po bid  coreg 3.125mg po bid  colace 100mg po bid  lasix 20mg po qd  lantus 35 units sq qhs  protonix 40mg po qd  carafate 1gram po q6 hours      Drug Dosing Weight  Height (cm): 157.5 (2019 15:41)  Weight (kg): 60.328 (2019 15:40)  BMI (kg/m2): 24.3 (2019 15:41)  BSA (m2): 1.61 (2019 15:41)    FAMILY HISTORY:  Family history of heart attack (Father):  76yo      SOCIAL HISTORY:  Smoker[ ]  Non smoker[ ]  Alcohol [ ]      ADVANCE DIRECTIVES:    REVIEW OF SYSTEMS:    CONSTITUTIONAL: No fevers, No chills, No fatigue, No weight gain  EYES: No vision changes   ENT: No congestion, No ear pain, No sore throat.  NECK: No pain, No stiffness  RESPIRATORY: endorses shortness of breath  CARDIOVASCULAR: endorses chest pain and SCOTT  GASTROINTESTINAL: endorses abdominal pain  GENITOURINARY: No dysuria, No frequency, No incontinence, No hematuria  NEUROLOGICAL: No dizziness, No lightheadedness, No syncope, No LOC, No headache, No numbness or weakness  EXTREMITIES: No Edema, No joint pain, No joint swelling.  PSYCHIATRIC: No anxiety, No depression    PHYSICAL EXAM:    Appearance: distress due to pain  HEENT:   Normal oral mucosa, PERRL, EOMI  Cardiovascular: Regular rate and rhythm, Normal S1 S2, No JVD, No murmurs  Respiratory: Lungs with crackles  Gastrointestinal:  Soft, Non-tender, + BS  Neurologic: Non-focal, A&Ox3  Skin: Warm and dry, No rashes, No ecchymosis, No cyanosis  Extremities: No clubbing, No cyanosis, No edema  Vascular: Peripheral pulses palpable 2+ bilaterally  Psychiatry: Mood & affect appropriate    CENTRAL LINES:   RIGHT FEMORAL BALLOON PUMP   RIGHT FEMORAL SWAN        	    		    ICU Vital Signs Last 24 Hrs  T(C): 36.2 (2019 11:11), Max: 36.5 (2019 23:08)  T(F): 97.2 (2019 11:11), Max: 97.7 (2019 23:08)  HR: 77 (2019 11:11) (67 - 77)  BP: 114/62 (2019 11:11) (101/67 - 114/62)  BP(mean): --  ABP: --  ABP(mean): --  RR: 20 (2019 11:11) (17 - 20)  SpO2: 100% (2019 11:11) (100% - 100%)          LABS:  CBC Full  -  ( 2019 07:03 )  WBC Count : 10.33 K/uL  RBC Count : 3.33 M/uL  Hemoglobin : 8.9 g/dL  Hematocrit : 27.6 %  Platelet Count - Automated : 296 K/uL  Mean Cell Volume : 82.9 fl  Mean Cell Hemoglobin : 26.7 pg  Mean Cell Hemoglobin Concentration : 32.2 gm/dL  Auto Neutrophil # : x  Auto Lymphocyte # : x  Auto Monocyte # : x  Auto Eosinophil # : x  Auto Basophil # : x  Auto Neutrophil % : x  Auto Lymphocyte % : x  Auto Monocyte % : x  Auto Eosinophil % : x  Auto Basophil % : x        133<L>  |  102  |  54<H>  ----------------------------<  91  4.8   |  22  |  2.19<H>    Ca    8.3<L>      2019 07:03          CAPILLARY BLOOD GLUCOSE      POCT Blood Glucose.: 175 mg/dL (2019 15:13)    PTT - ( 2019 11:52 )  PTT:85.9 sec      I&O's Detail      EKG:    ECHO< from: Transthoracic Echocardiogram (19 @ 09:00) >  Ejection Fraction Visual Estimate: 20-25 %    ------------------------------------------------------------------------  OBSERVATIONS:  Mitral Valve: Mitral annular calcification, and calcified  mitral leaflets with normal diastolic opening. Tethered  mitral valve leaflets. Moderate mitral regurgitation.  Aortic Root: Normal aortic root.  Aortic Valve: Calcified aortic valve with normal opening.  Trace aortic regurgitation.  Left Atrium: Normal left atrium.  LA volume index = 16  cc/m2.  Left Ventricle: Severe segmental left ventricular systolic  dysfunction. The entire anterior wall, anteroseptum and  apex are akinetic. Mild left ventricular enlargement.  Right Heart: Normal right atrium. Normal right ventricular  size and function. A device lead is visualized in the right  heart. There is moderate tricuspid regurgitation. There is  trace pulmonicregurgitation.  Pericardium/PleuraNormal pericardium with no pericardial  effusion.  Hemodynamic: RA Pressure is 10 mm Hg. RV systolic pressure  is 68 mm Hg. Severe pulmonary hypertension.  ------------------------------------------------------------------------  CONCLUSIONS:  1. Mitral annular calcification, and calcified mitral  leaflets with normal diastolic opening. Tethered mitral  valve leaflets. Moderate mitral regurgitation.  2.  Trace aortic regurgitation.  3. Mild left ventricular enlargement.  4. Severe segmental left ventricular systolic dysfunction.  The entire anterior wall, anteroseptum and apex are  akinetic.  5. Normal right ventricular size and function. A device  lead is visualized in the right heart.  6. RV systolic pressure is 68 mm Hg. Severe pulmonary  hypertension.    ------------------------------------------------------------------------  Confirmed on  2019 - 10:26:55 by Juan Diego Loco MD  ------------------------------------------------------------------------    < end of copied text >        RADIOLOGY STUDIES    CXR:     CT SCAN:     ULTRASOUND:     ASSESSMENT      PLAN          Case discussed with Cardiology fellow

## 2019-06-19 NOTE — DIETITIAN INITIAL EVALUATION ADULT. - PROBLEM SELECTOR PLAN 2
-Systolic CHF w/ EF 20%  -Pt has ICD in place   -will hold ace/arb for now in setting of elevated creat- may be pt's baseline, but do not have prior for comparison   -c/w asa, BB, lasix for now   -f/u Echo per MD

## 2019-06-19 NOTE — TRANSFER ACCEPTANCE NOTE - PMH
RUTH (acute kidney injury)    Anemia    CAD (coronary artery disease)    Chronic CHF  Systolic EF 20%  Constipation    Diabetes mellitus type 2 in nonobese    Hyperlipidemia, unspecified hyperlipidemia type    Hypertension, unspecified type    NSTEMI (non-ST elevated myocardial infarction)    Stented coronary artery  2011 at [facility]  February 2019 [facility] RUTH (acute kidney injury)    Anemia    CAD (coronary artery disease)    Chronic CHF  Systolic EF 20%  Constipation    Diabetes mellitus type 2 in nonobese    Hyperlipidemia, unspecified hyperlipidemia type    Hypertension, unspecified type    Liver lesion    NSTEMI (non-ST elevated myocardial infarction)    Stented coronary artery  2011 at [facility]  February 2019 [facility]

## 2019-06-19 NOTE — CONSULT NOTE ADULT - PROBLEM SELECTOR RECOMMENDATION 7
will continue to monitor BUN and creatinine  -avoid nephrotoxic agents, patient did receive contrast with cardiac cath

## 2019-06-19 NOTE — CONSULT NOTE ADULT - PROBLEM SELECTOR RECOMMENDATION 6
Patient will need heart failure optimization.   RIGHT HEART CATH AND SWAN CO 2.85 index 1.79 EDP 34  -She has a SWAN in the right groin, will monitor BO and blood gas  -will initiate lasix 40mg ivp and start lasix gtt@ 10mg/hr   -trend CVP

## 2019-06-19 NOTE — DIETITIAN INITIAL EVALUATION ADULT. - NUTRITION INTERVENTION
Feeding Assistance/Meals and Snack/Medical Food Supplements/Nutrition Education/Collaboration and Referral of Nutrition Care Medical Food Supplements/Meals and Snack/Feeding Assistance/Collaboration and Referral of Nutrition Care Collaboration and Referral of Nutrition Care/Meals and Snack/Nutrition Education/Feeding Assistance/Medical Food Supplements

## 2019-06-19 NOTE — TRANSFER ACCEPTANCE NOTE - HISTORY OF PRESENT ILLNESS
Pt is a 73F, from home, w/ a PMHx of HTN, HLD, DM, CHF (EF 20%) w/ ICD in left chest, and CAD with stent placed Feb 2019 who presents to the ED with abdominal pain x 1 week.  In the ED, pt presents in no acute distress, vitals WNL, and EKG sinus w/ ST depressions in 1, V1, V5, V6.  Pt remains afebrile w/o leukocytosis.  Remaining labs sig creat of 1.66, and trop of 2.6- last trop at [place] [facility] recorded was 1.  Pt was admitted to [facility] for management of NSTEMI, started on heparin drip as per cardio Dr Hood. Troponin increased to 3.14 before downtrending to 2.77. She was noted to have roverto on ckd with creatinine elevated to 2.12. Noted to have stable but lower level of hgb, pain resolved. Heparin drip to be held, patient for transfer to [facility] for cardiac cath. 73 year old female with HTN, hyperlipidemia, DM-II, CHF (EF 20%) w/ ICD in left chest, and CAD with multiple PTCA/stents, most recent Feb 2019 who presented to  ED 6/16/19 complaining of abdominal pain x 1 week.  EKG sinus w/ ST depressions in 1, V1, V5, V6.  Labs remarkable for NSTEMI with peak troponin 3.6, Cr 1.66, Admitted to [facility] for management of NSTEMI, started on heparin drip. She was noted to have RUTH on CKD with peak Cr 2.1 and anemia with h/h 8.7/27. Underwent a CT Abd/pelvis with 2 hepatic lesions in the lateral segment left hepatic lobe containing macroscopic fat. Consider hepatocellular carcinoma if patient is cirrhotic. Otherwise, angiomyolipoma or an FNH could be considered. Other etiologies are not excluded. Pacemaker precludes MRI. Recommend contrast-enhanced multiphase CT with liver protocol.  In light of patients CAD history, symptoms and abnormal noninvasive test findings there is high suspicion for CAD progression. Patient is now transferred to [facility] for a cardiac catheterization with possible PTCA/stent.   On arrival patient complained of 8/10 chest pain with EKG revealing lateral lead ischemia but no acute changes and stable from previous EKG. Symptoms resolved spontaneously without intervention and underwent cath.

## 2019-06-19 NOTE — DIETITIAN INITIAL EVALUATION ADULT. - PROBLEM SELECTOR PLAN 4
-elevated creat post stent placement- unknown baseline  -Pt sees OP nephro- Dr. Arnold   -managing team to contact OP nephro for baseline creat medications per MD

## 2019-06-19 NOTE — TRANSFER ACCEPTANCE NOTE - RS GEN PE MLT RESP DETAILS PC
bibasilar rales/good air movement/diminished breath sounds, R/respirations non-labored/diminished breath sounds, L

## 2019-06-19 NOTE — TRANSFER ACCEPTANCE NOTE - GASTROINTESTINAL DETAILS
no distention/left upper quadrant tenderness, no guarding, no rebound/nontender/no masses palpable/bowel sounds normal

## 2019-06-19 NOTE — CHART NOTE - NSCHARTNOTEFT_GEN_A_CORE
Rt radial band d/drew. No hematoma or bleeding noted .Rt radial pulse +2 with good capillary refills to all fingers.4x4 gauze dsg  applied.

## 2019-06-19 NOTE — DIETITIAN INITIAL EVALUATION ADULT. - PERTINENT LABORATORY DATA
reviewed   06-19 Na133 mmol/L<L> Glu 91 mg/dL K+ 4.8 mmol/L Cr  2.19 mg/dL<H> BUN 54 mg/dL<H>   Finger stick range=70 to 238   HgBa1C=15.4

## 2019-06-20 DIAGNOSIS — K76.9 LIVER DISEASE, UNSPECIFIED: ICD-10-CM

## 2019-06-20 LAB
ANION GAP SERPL CALC-SCNC: 14 MMO/L — SIGNIFICANT CHANGE UP (ref 7–14)
APTT BLD: 135.2 SEC — CRITICAL HIGH (ref 27.5–36.3)
APTT BLD: 136.6 SEC — CRITICAL HIGH (ref 27.5–36.3)
BASE EXCESS BLDA CALC-SCNC: 0.2 MMOL/L — SIGNIFICANT CHANGE UP
BASE EXCESS BLDA CALC-SCNC: 1.8 MMOL/L — SIGNIFICANT CHANGE UP
BASE EXCESS BLDV CALC-SCNC: 3 MMOL/L — SIGNIFICANT CHANGE UP
BASE EXCESS BLDV CALC-SCNC: 3.4 MMOL/L — SIGNIFICANT CHANGE UP
BLOOD GAS ARTERIAL - FIO2: 21 — SIGNIFICANT CHANGE UP
BLOOD GAS ARTERIAL - FIO2: 21 — SIGNIFICANT CHANGE UP
BLOOD GAS VENOUS - CREATININE: 1.74 MG/DL — HIGH (ref 0.5–1.3)
BLOOD GAS VENOUS - FIO2: 30 — SIGNIFICANT CHANGE UP
BUN SERPL-MCNC: 49 MG/DL — HIGH (ref 7–23)
CA-I BLDA-SCNC: 1.12 MMOL/L — LOW (ref 1.15–1.29)
CALCIUM SERPL-MCNC: 8.6 MG/DL — SIGNIFICANT CHANGE UP (ref 8.4–10.5)
CHLORIDE BLDA-SCNC: 106 MMOL/L — SIGNIFICANT CHANGE UP (ref 96–108)
CHLORIDE BLDV-SCNC: 101 MMOL/L — SIGNIFICANT CHANGE UP (ref 96–108)
CHLORIDE SERPL-SCNC: 99 MMOL/L — SIGNIFICANT CHANGE UP (ref 98–107)
CO2 SERPL-SCNC: 24 MMOL/L — SIGNIFICANT CHANGE UP (ref 22–31)
CREAT SERPL-MCNC: 1.84 MG/DL — HIGH (ref 0.5–1.3)
GAS PNL BLDV: 134 MMOL/L — LOW (ref 136–146)
GLUCOSE BLDA-MCNC: 130 MG/DL — HIGH (ref 70–99)
GLUCOSE BLDA-MCNC: 145 MG/DL — HIGH (ref 70–99)
GLUCOSE BLDC GLUCOMTR-MCNC: 150 MG/DL — HIGH (ref 70–99)
GLUCOSE BLDC GLUCOMTR-MCNC: 227 MG/DL — HIGH (ref 70–99)
GLUCOSE BLDC GLUCOMTR-MCNC: 238 MG/DL — HIGH (ref 70–99)
GLUCOSE BLDC GLUCOMTR-MCNC: 247 MG/DL — HIGH (ref 70–99)
GLUCOSE BLDV-MCNC: 213 MG/DL — HIGH (ref 70–99)
GLUCOSE SERPL-MCNC: 138 MG/DL — HIGH (ref 70–99)
HAV IGG SER QL IA: REACTIVE
HAV IGM SER-ACNC: SIGNIFICANT CHANGE UP
HBV CORE AB SER-ACNC: REACTIVE
HBV E AB SER-ACNC: POSITIVE
HBV E AG SER-ACNC: NEGATIVE — SIGNIFICANT CHANGE UP
HBV SURFACE AB SER-ACNC: REACTIVE
HBV SURFACE AG SER-ACNC: NEGATIVE — SIGNIFICANT CHANGE UP
HCO3 BLDA-SCNC: 25 MMOL/L — SIGNIFICANT CHANGE UP (ref 22–26)
HCO3 BLDA-SCNC: 26 MMOL/L — SIGNIFICANT CHANGE UP (ref 22–26)
HCO3 BLDV-SCNC: 26 MMOL/L — SIGNIFICANT CHANGE UP (ref 20–27)
HCO3 BLDV-SCNC: 27 MMOL/L — SIGNIFICANT CHANGE UP (ref 20–27)
HCT VFR BLD CALC: 26 % — LOW (ref 34.5–45)
HCT VFR BLD CALC: 28.6 % — LOW (ref 34.5–45)
HCT VFR BLDA CALC: 26.1 % — LOW (ref 34.5–46.5)
HCT VFR BLDA CALC: 28.5 % — LOW (ref 34.5–46.5)
HCT VFR BLDV CALC: 29.1 % — LOW (ref 34.5–45)
HGB BLD-MCNC: 8.4 G/DL — LOW (ref 11.5–15.5)
HGB BLD-MCNC: 9 G/DL — LOW (ref 11.5–15.5)
HGB BLDA-MCNC: 8.4 G/DL — LOW (ref 11.5–15.5)
HGB BLDA-MCNC: 9.2 G/DL — LOW (ref 11.5–15.5)
HGB BLDV-MCNC: 9.4 G/DL — LOW (ref 11.5–15.5)
LACTATE BLDA-SCNC: 1.4 MMOL/L — SIGNIFICANT CHANGE UP (ref 0.5–2)
LACTATE BLDA-SCNC: 1.6 MMOL/L — SIGNIFICANT CHANGE UP (ref 0.5–2)
LACTATE BLDV-MCNC: 1 MMOL/L — SIGNIFICANT CHANGE UP (ref 0.5–2)
MAGNESIUM SERPL-MCNC: 2.5 MG/DL — SIGNIFICANT CHANGE UP (ref 1.6–2.6)
MCHC RBC-ENTMCNC: 26.2 PG — LOW (ref 27–34)
MCHC RBC-ENTMCNC: 26.5 PG — LOW (ref 27–34)
MCHC RBC-ENTMCNC: 31.5 % — LOW (ref 32–36)
MCHC RBC-ENTMCNC: 32.3 % — SIGNIFICANT CHANGE UP (ref 32–36)
MCV RBC AUTO: 82 FL — SIGNIFICANT CHANGE UP (ref 80–100)
MCV RBC AUTO: 83.1 FL — SIGNIFICANT CHANGE UP (ref 80–100)
NRBC # FLD: 0.05 K/UL — SIGNIFICANT CHANGE UP (ref 0–0)
NRBC # FLD: 0.07 K/UL — SIGNIFICANT CHANGE UP (ref 0–0)
PCO2 BLDA: 22 MMHG — LOW (ref 32–48)
PCO2 BLDA: 37 MMHG — SIGNIFICANT CHANGE UP (ref 32–48)
PCO2 BLDV: 45 MMHG — SIGNIFICANT CHANGE UP (ref 41–51)
PCO2 BLDV: 47 MMHG — SIGNIFICANT CHANGE UP (ref 41–51)
PH BLDA: 7.45 PH — SIGNIFICANT CHANGE UP (ref 7.35–7.45)
PH BLDA: 7.6 PH — CRITICAL HIGH (ref 7.35–7.45)
PH BLDV: 7.39 PH — SIGNIFICANT CHANGE UP (ref 7.32–7.43)
PH BLDV: 7.4 PH — SIGNIFICANT CHANGE UP (ref 7.32–7.43)
PHOSPHATE SERPL-MCNC: 4.4 MG/DL — SIGNIFICANT CHANGE UP (ref 2.5–4.5)
PLATELET # BLD AUTO: 264 K/UL — SIGNIFICANT CHANGE UP (ref 150–400)
PLATELET # BLD AUTO: 266 K/UL — SIGNIFICANT CHANGE UP (ref 150–400)
PMV BLD: 11 FL — SIGNIFICANT CHANGE UP (ref 7–13)
PMV BLD: 11.6 FL — SIGNIFICANT CHANGE UP (ref 7–13)
PO2 BLDA: 114 MMHG — HIGH (ref 83–108)
PO2 BLDA: 132 MMHG — HIGH (ref 83–108)
PO2 BLDV: 34 MMHG — LOW (ref 35–40)
PO2 BLDV: 34 MMHG — LOW (ref 35–40)
POTASSIUM BLDA-SCNC: 4 MMOL/L — SIGNIFICANT CHANGE UP (ref 3.4–4.5)
POTASSIUM BLDA-SCNC: 4.4 MMOL/L — SIGNIFICANT CHANGE UP (ref 3.4–4.5)
POTASSIUM BLDV-SCNC: 4.1 MMOL/L — SIGNIFICANT CHANGE UP (ref 3.4–4.5)
POTASSIUM SERPL-MCNC: 4.3 MMOL/L — SIGNIFICANT CHANGE UP (ref 3.5–5.3)
POTASSIUM SERPL-SCNC: 4.3 MMOL/L — SIGNIFICANT CHANGE UP (ref 3.5–5.3)
RBC # BLD: 3.17 M/UL — LOW (ref 3.8–5.2)
RBC # BLD: 3.44 M/UL — LOW (ref 3.8–5.2)
RBC # FLD: 13.8 % — SIGNIFICANT CHANGE UP (ref 10.3–14.5)
RBC # FLD: 13.9 % — SIGNIFICANT CHANGE UP (ref 10.3–14.5)
SAO2 % BLDA: 98.5 % — SIGNIFICANT CHANGE UP (ref 95–99)
SAO2 % BLDA: 98.5 % — SIGNIFICANT CHANGE UP (ref 95–99)
SAO2 % BLDV: 58.1 % — LOW (ref 60–85)
SAO2 % BLDV: 58.2 % — LOW (ref 60–85)
SODIUM BLDA-SCNC: 133 MMOL/L — LOW (ref 136–146)
SODIUM BLDA-SCNC: 135 MMOL/L — LOW (ref 136–146)
SODIUM SERPL-SCNC: 137 MMOL/L — SIGNIFICANT CHANGE UP (ref 135–145)
WBC # BLD: 10.58 K/UL — HIGH (ref 3.8–10.5)
WBC # BLD: 11.86 K/UL — HIGH (ref 3.8–10.5)
WBC # FLD AUTO: 10.58 K/UL — HIGH (ref 3.8–10.5)
WBC # FLD AUTO: 11.86 K/UL — HIGH (ref 3.8–10.5)

## 2019-06-20 PROCEDURE — 99223 1ST HOSP IP/OBS HIGH 75: CPT

## 2019-06-20 PROCEDURE — 99222 1ST HOSP IP/OBS MODERATE 55: CPT | Mod: GC

## 2019-06-20 PROCEDURE — 71045 X-RAY EXAM CHEST 1 VIEW: CPT | Mod: 26

## 2019-06-20 RX ORDER — ISOSORBIDE DINITRATE 5 MG/1
10 TABLET ORAL ONCE
Refills: 0 | Status: COMPLETED | OUTPATIENT
Start: 2019-06-20 | End: 2019-06-20

## 2019-06-20 RX ORDER — HEPARIN SODIUM 5000 [USP'U]/ML
1000 INJECTION INTRAVENOUS; SUBCUTANEOUS
Qty: 25000 | Refills: 0 | Status: DISCONTINUED | OUTPATIENT
Start: 2019-06-20 | End: 2019-06-23

## 2019-06-20 RX ORDER — ACETAMINOPHEN 500 MG
650 TABLET ORAL ONCE
Refills: 0 | Status: COMPLETED | OUTPATIENT
Start: 2019-06-20 | End: 2019-06-20

## 2019-06-20 RX ORDER — FUROSEMIDE 40 MG
40 TABLET ORAL
Refills: 0 | Status: DISCONTINUED | OUTPATIENT
Start: 2019-06-20 | End: 2019-06-21

## 2019-06-20 RX ORDER — ISOSORBIDE DINITRATE 5 MG/1
10 TABLET ORAL THREE TIMES A DAY
Refills: 0 | Status: DISCONTINUED | OUTPATIENT
Start: 2019-06-20 | End: 2019-06-24

## 2019-06-20 RX ADMIN — Medication 4: at 16:48

## 2019-06-20 RX ADMIN — HEPARIN SODIUM 8 UNIT(S)/HR: 5000 INJECTION INTRAVENOUS; SUBCUTANEOUS at 13:40

## 2019-06-20 RX ADMIN — Medication 5 MG/HR: at 08:08

## 2019-06-20 RX ADMIN — TICAGRELOR 90 MILLIGRAM(S): 90 TABLET ORAL at 09:07

## 2019-06-20 RX ADMIN — SODIUM CHLORIDE 3 MILLILITER(S): 9 INJECTION INTRAMUSCULAR; INTRAVENOUS; SUBCUTANEOUS at 21:41

## 2019-06-20 RX ADMIN — Medication 100 MILLIGRAM(S): at 05:47

## 2019-06-20 RX ADMIN — ISOSORBIDE DINITRATE 10 MILLIGRAM(S): 5 TABLET ORAL at 21:43

## 2019-06-20 RX ADMIN — Medication 40 MILLIGRAM(S): at 18:12

## 2019-06-20 RX ADMIN — TICAGRELOR 90 MILLIGRAM(S): 90 TABLET ORAL at 21:31

## 2019-06-20 RX ADMIN — PANTOPRAZOLE SODIUM 40 MILLIGRAM(S): 20 TABLET, DELAYED RELEASE ORAL at 05:47

## 2019-06-20 RX ADMIN — Medication 650 MILLIGRAM(S): at 22:00

## 2019-06-20 RX ADMIN — SODIUM CHLORIDE 3 MILLILITER(S): 9 INJECTION INTRAMUSCULAR; INTRAVENOUS; SUBCUTANEOUS at 13:27

## 2019-06-20 RX ADMIN — ATORVASTATIN CALCIUM 40 MILLIGRAM(S): 80 TABLET, FILM COATED ORAL at 21:29

## 2019-06-20 RX ADMIN — ISOSORBIDE DINITRATE 10 MILLIGRAM(S): 5 TABLET ORAL at 15:38

## 2019-06-20 RX ADMIN — HEPARIN SODIUM 10 UNIT(S)/HR: 5000 INJECTION INTRAVENOUS; SUBCUTANEOUS at 06:34

## 2019-06-20 RX ADMIN — Medication 6 MICROGRAM(S)/MIN: at 08:10

## 2019-06-20 RX ADMIN — SODIUM CHLORIDE 3 MILLILITER(S): 9 INJECTION INTRAMUSCULAR; INTRAVENOUS; SUBCUTANEOUS at 05:48

## 2019-06-20 RX ADMIN — CHLORHEXIDINE GLUCONATE 1 APPLICATION(S): 213 SOLUTION TOPICAL at 05:47

## 2019-06-20 RX ADMIN — INSULIN GLARGINE 25 UNIT(S): 100 INJECTION, SOLUTION SUBCUTANEOUS at 21:30

## 2019-06-20 RX ADMIN — Medication 6 MICROGRAM(S)/MIN: at 08:08

## 2019-06-20 RX ADMIN — Medication 81 MILLIGRAM(S): at 11:59

## 2019-06-20 RX ADMIN — Medication 6 MICROGRAM(S)/MIN: at 05:47

## 2019-06-20 RX ADMIN — Medication 4: at 12:07

## 2019-06-20 RX ADMIN — Medication 650 MILLIGRAM(S): at 21:29

## 2019-06-20 NOTE — CONSULT NOTE ADULT - SUBJECTIVE AND OBJECTIVE BOX
[facility]   MD YENI MARTÍNEZ MD ANGELA WONG, PA    TEL:  OFFICE: [phone]  DR MI CELL: [phone]  DR. LUNA CELL: [phone]  JAJA VELASQUEZ CELL: [phone]      HPI:  73 year old female with HTN, hyperlipidemia, DM-II, CHF (EF 20%) w/ ICD in left chest, and CAD with multiple PTCA/stents, most recent 2019 who presented to  ED 19 complaining of abdominal pain x 1 week. found to have abnormal EKG and trop admited for management of NSTEMI on hep drip.  She was noted to have RUTH on CKD with peak Cr 2.1 and anemia with h/h 8.7/. Underwent a CT Abd/pelvis with 2 hepatic lesions in the lateral segment left hepatic lobe containing macroscopic fat. Recommend contrast-enhanced multiphase CT with liver protocol.  In light of patients CAD history, symptoms and abnormal noninvasive test findings there is high suspicion for CAD progression. Patient is now transferred to [facility] for a cardiac catheterization  found to have 99% LM and 100% LAD received intra-aortic balloon pump and was transferred to CCU post cath for further management.  Patient follow up with dr. mi for ckd stage 3-4 management. renal function fluctuates 1.7-1.9. +proteinuria work up was ordered but not done. pt seen and examined at bedside, c/o slight sob and chest pain, pain from right groin         Allergies:  No Known Allergies      PAST MEDICAL & SURGICAL HISTORY:  Liver lesion  Stented coronary artery: 2011 at [facility]  2019 [facility]  NSTEMI (non-ST elevated myocardial infarction)  Diabetes mellitus type 2 in nonobese  Constipation  Anemia  RUTH (acute kidney injury)  CAD (coronary artery disease)  Chronic CHF: Systolic EF 20%  Hyperlipidemia, unspecified hyperlipidemia type  Hypertension, unspecified type  Implantable cardioverter-defibrillator (ICD) in situ      Home Medications Reviewed    Hospital Medications:   MEDICATIONS  (STANDING):  aspirin enteric coated 81 milliGRAM(s) Oral daily  atorvastatin 40 milliGRAM(s) Oral at bedtime  chlorhexidine 4% Liquid 1 Application(s) Topical <User Schedule>  dextrose 5%. 1000 milliLiter(s) (50 mL/Hr) IV Continuous <Continuous>  dextrose 50% Injectable 12.5 Gram(s) IV Push once  dextrose 50% Injectable 25 Gram(s) IV Push once  docusate sodium 100 milliGRAM(s) Oral two times a day  furosemide Infusion 10 mG/Hr (5 mL/Hr) IV Continuous <Continuous>  heparin  Infusion 1000 Unit(s)/Hr (10 mL/Hr) IV Continuous <Continuous>  insulin glargine Injectable (LANTUS) 25 Unit(s) SubCutaneous at bedtime  insulin lispro (HumaLOG) corrective regimen sliding scale   SubCutaneous three times a day before meals  insulin lispro (HumaLOG) corrective regimen sliding scale   SubCutaneous at bedtime  nitroglycerin  Infusion 20 MICROgram(s)/Min (6 mL/Hr) IV Continuous <Continuous>  pantoprazole    Tablet 40 milliGRAM(s) Oral before breakfast  senna 2 Tablet(s) Oral at bedtime  sodium chloride 0.9% lock flush 3 milliLiter(s) IV Push every 8 hours  ticagrelor 90 milliGRAM(s) Oral every 12 hours      SOCIAL HISTORY:  Denies ETOh, Smoking,     FAMILY HISTORY:  Family history of heart attack (Father):  74yo      REVIEW OF SYSTEMS:  CONSTITUTIONAL: No weakness, fevers or chills  EYES/ENT: No visual changes;  No vertigo or throat pain   NECK: No pain or stiffness  RESPIRATORY: see hpi  CARDIOVASCULAR: see hpi.  GASTROINTESTINAL: No abdominal or epigastric pain. No nausea, vomiting, or hematemesis; No diarrhea or constipation. No melena or hematochezia.  GENITOURINARY: No dysuria, frequency, foamy urine, urinary urgency, incontinence or hematuria  NEUROLOGICAL: No numbness or weakness  SKIN: No itching, burning, rashes, or lesions   VASCULAR: No bilateral lower extremity edema.   All other review of systems is negative unless indicated above.    VITALS:  T(F): 97.2 (19 @ 08:00), Max: 97.2 (19 @ 11:11)  HR: 81 (19 @ 09:00)  BP: 123/70 (19 @ 09:00)  RR: 24 (19 @ 09:00)  SpO2: 100% (19 @ 09:00)  Wt(kg): --     @ 07:01  -   @ 07:00  --------------------------------------------------------  IN: 210 mL / OUT: 3200 mL / NET: -2990 mL     @ 07:01  -   @ 09:59  --------------------------------------------------------  IN: 228 mL / OUT: 500 mL / NET: -272 mL      Height (cm): 160.02 ( @ 18:50)  Weight (kg): 62.4 ( @ 18:50)  BMI (kg/m2): 24.4 ( @ 18:50)  BSA (m2): 1.65 ( @ 18:50)    PHYSICAL EXAM:  Constitutional: NAD  HEENT: anicteric sclera, oropharynx clear, MMM  Neck: No JVD  Respiratory: CTAB, no wheezes, rales or rhonchi  Cardiovascular: S1, S2, RRR  Gastrointestinal: BS+, soft, NT/ND  Extremities: right groin cath. No peripheral edema  Neurological: A/O x 3, no focal deficits  Psychiatric: Normal mood, normal affect  : No CVA tenderness. No lemos.   Skin: No rashes      LABS:      137  |  99  |  49<H>  ----------------------------<  138<H>  4.3   |  24  |  1.84<H>    Ca    8.6      2019 03:45  Phos  4.4       Mg     2.5         TPro  6.6  /  Alb  2.9<L>  /  TBili  0.4  /  DBili      /  AST  18  /  ALT  15  /  AlkPhos  102      Creatinine Trend: 1.84 <--, 1.78 <--, 2.19 <--, 2.04 <--, 1.91 <--, 2.12 <--, 1.69 <--, 1.66 <--                        8.4    10.58 )-----------( 266      ( 2019 03:45 )             26.0     Urine Studies:        RADIOLOGY & ADDITIONAL STUDIES: [facility]   MD YENI MARTÍNEZ MD ANGELA WONG, PA    TEL:  OFFICE: [phone]  DR MI CELL: [phone]  DR. LUNA CELL: [phone]  JAJA VELASQUEZ CELL: [phone]      HPI:  73 year old female with HTN, hyperlipidemia, DM-II, CHF (EF 20%) w/ ICD in left chest, and CAD with multiple PTCA/stents, most recent 2019 who presented to  ED 19 complaining of abdominal pain x 1 week. found to have abnormal EKG and trop admited for management of NSTEMI on hep drip.  She was noted to have RUTH on CKD with peak Cr 2.1 and anemia with h/h 8.7/. Underwent a CT Abd/pelvis with 2 hepatic lesions in the lateral segment left hepatic lobe containing macroscopic fat. Recommend contrast-enhanced multiphase CT with liver protocol.  In light of patients CAD history, symptoms and abnormal noninvasive test findings there is high suspicion for CAD progression. Patient is now transferred to [facility] for a cardiac catheterization  found to have 99% LM and 100% LAD received intra-aortic balloon pump and was transferred to CCU post cath for further management.    Patient follow up with dr. mi for ckd stage 3-4 management. renal function fluctuates 1.7-1.9. +proteinuria work up was ordered but not done. pt seen and examined at bedside, c/o slight sob and chest pain, pain from right groin         Allergies:  No Known Allergies      PAST MEDICAL & SURGICAL HISTORY:  Liver lesion  Stented coronary artery: 2011 at [facility]  2019 [facility]  NSTEMI (non-ST elevated myocardial infarction)  Diabetes mellitus type 2 in nonobese  Constipation  Anemia  RUTH (acute kidney injury)  CAD (coronary artery disease)  Chronic CHF: Systolic EF 20%  Hyperlipidemia, unspecified hyperlipidemia type  Hypertension, unspecified type  Implantable cardioverter-defibrillator (ICD) in situ      Home Medications Reviewed    Hospital Medications:   MEDICATIONS  (STANDING):  aspirin enteric coated 81 milliGRAM(s) Oral daily  atorvastatin 40 milliGRAM(s) Oral at bedtime  chlorhexidine 4% Liquid 1 Application(s) Topical <User Schedule>  dextrose 5%. 1000 milliLiter(s) (50 mL/Hr) IV Continuous <Continuous>  dextrose 50% Injectable 12.5 Gram(s) IV Push once  dextrose 50% Injectable 25 Gram(s) IV Push once  docusate sodium 100 milliGRAM(s) Oral two times a day  furosemide Infusion 10 mG/Hr (5 mL/Hr) IV Continuous <Continuous>  heparin  Infusion 1000 Unit(s)/Hr (10 mL/Hr) IV Continuous <Continuous>  insulin glargine Injectable (LANTUS) 25 Unit(s) SubCutaneous at bedtime  insulin lispro (HumaLOG) corrective regimen sliding scale   SubCutaneous three times a day before meals  insulin lispro (HumaLOG) corrective regimen sliding scale   SubCutaneous at bedtime  nitroglycerin  Infusion 20 MICROgram(s)/Min (6 mL/Hr) IV Continuous <Continuous>  pantoprazole    Tablet 40 milliGRAM(s) Oral before breakfast  senna 2 Tablet(s) Oral at bedtime  sodium chloride 0.9% lock flush 3 milliLiter(s) IV Push every 8 hours  ticagrelor 90 milliGRAM(s) Oral every 12 hours      SOCIAL HISTORY:  Denies ETOh, Smoking,     FAMILY HISTORY:  Family history of heart attack (Father):  76yo      REVIEW OF SYSTEMS:  CONSTITUTIONAL: No weakness, fevers or chills  EYES/ENT: No visual changes;  No vertigo or throat pain   NECK: No pain or stiffness  RESPIRATORY: see hpi  CARDIOVASCULAR: see hpi.  GASTROINTESTINAL: No abdominal or epigastric pain. No nausea, vomiting, or hematemesis; No diarrhea or constipation. No melena or hematochezia.  GENITOURINARY: No dysuria, frequency, foamy urine, urinary urgency, incontinence or hematuria  NEUROLOGICAL: No numbness or weakness  SKIN: No itching, burning, rashes, or lesions   VASCULAR: No bilateral lower extremity edema.   All other review of systems is negative unless indicated above.    VITALS:  T(F): 97.2 (19 @ 08:00), Max: 97.2 (19 @ 11:11)  HR: 81 (19 @ 09:00)  BP: 123/70 (19 @ 09:00)  RR: 24 (19 @ 09:00)  SpO2: 100% (19 @ 09:00)  Wt(kg): --     @ 07:01  -   @ 07:00  --------------------------------------------------------  IN: 210 mL / OUT: 3200 mL / NET: -2990 mL     @ 07:01  -   @ 09:59  --------------------------------------------------------  IN: 228 mL / OUT: 500 mL / NET: -272 mL      Height (cm): 160.02 ( @ 18:50)  Weight (kg): 62.4 ( @ 18:50)  BMI (kg/m2): 24.4 ( @ 18:50)  BSA (m2): 1.65 ( @ 18:50)    PHYSICAL EXAM:  Constitutional: NAD  HEENT: anicteric sclera, oropharynx clear, MMM  Neck: No JVD  Respiratory: CTAB, no wheezes, rales or rhonchi  Cardiovascular: S1, S2, RRR  Gastrointestinal: BS+, soft, NT/ND  Extremities: right groin cath. No peripheral edema  Neurological: A/O x 3, no focal deficits  Psychiatric: Normal mood, normal affect  : No CVA tenderness. No lemos.   Skin: No rashes      LABS:      137  |  99  |  49<H>  ----------------------------<  138<H>  4.3   |  24  |  1.84<H>    Ca    8.6      2019 03:45  Phos  4.4       Mg     2.5         TPro  6.6  /  Alb  2.9<L>  /  TBili  0.4  /  DBili      /  AST  18  /  ALT  15  /  AlkPhos  102      Creatinine Trend: 1.84 <--, 1.78 <--, 2.19 <--, 2.04 <--, 1.91 <--, 2.12 <--, 1.69 <--, 1.66 <--                        8.4    10.58 )-----------( 266      ( 2019 03:45 )             26.0     Urine Studies:        RADIOLOGY & ADDITIONAL STUDIES:

## 2019-06-20 NOTE — CONSULT NOTE ADULT - PROBLEM SELECTOR RECOMMENDATION 9
- p/w abd pain CT showing 2 hepatic lesions in the lateral segment of left hepatic lobe containing macroscopic fat.   - Consider triple phase CT to see if HCC  - Send Hep B DNA and Hep B e Antigen - p/w abd pain CT showing 2 hepatic lesions in the lateral segment of left hepatic lobe containing macroscopic fat.     - Consider triple phase CT to see if HCC  - Send Hep B DNA and Hep B e Antigen Relevant history: presented with abd pain CT showing 2 hepatic lesions in the lateral segment of left hepatic lobe containing macroscopic fat. No known history of cirrhosis or obesity, recent history of early satiety without significant weight loss, abdominal pain or night sweats. As per labs, + reactive HepB core ab total with possibly history of prior HepB infection    - Consider triple phase CT to see if HCC  - Send Hep B DNA and Hep B e Antigen to r/o current infection  - Patient's recent symptoms warrant outpatient follow up and workup of early satiety

## 2019-06-20 NOTE — CONSULT NOTE ADULT - ASSESSMENT
73 year old female with HTN, hyperlipidemia, DM-II, CHF (EF 20%) w/ ICD in left chest, and CAD with multiple PTCA/stents (lastt February 2019) presents with NSTEMI s/p diagnostic cath on 6/19/19 and intra-aortic balloon pump pending interventional cath, found to have liver lesions on CT concerning for HCC vs angiomyolipoma vs FNH 73 year old female with HTN, hyperlipidemia, DM-II, CHF (EF 20%) w/ ICD in left chest, and CAD with multiple PTCA/stents (lastt February 2019) presents with NSTEMI s/p diagnostic cath on 6/19/19 and intra-aortic balloon pump pending interventional cath, found to have liver lesions on CT concerning for HCC vs angiomyolipoma vs FNH    Relevant history: presented with abd pain CT showing 2 hepatic lesions in the lateral segment of left hepatic lobe containing macroscopic fat. No known history of cirrhosis or obesity, recent history of early satiety without significant weight loss, abdominal pain or night sweats. As per labs, + reactive HepB core ab total with possibly history of prior HepB infection    - Consider triple phase CT to see if HCC  - Send Hep B DNA and Hep B e Antigen to r/o current infection  - Patient's recent symptoms warrant outpatient follow up and workup of early satiety

## 2019-06-20 NOTE — PROGRESS NOTE ADULT - SUBJECTIVE AND OBJECTIVE BOX
Ricki Bella, PGY1  Pager: [phone]      Patient is a 73y old  Female who presents with a chief complaint of NSTEMI, Post- cath transfer to CCU (20 Jun 2019 10:31)      SUBJECTIVE / OVERNIGHT EVENTS:    Overnight, no acute events. Denies cp, sob, abd pain. Complains of pain suprapubically.     MEDICATIONS  (STANDING):  aspirin enteric coated 81 milliGRAM(s) Oral daily  atorvastatin 40 milliGRAM(s) Oral at bedtime  chlorhexidine 4% Liquid 1 Application(s) Topical <User Schedule>  dextrose 5%. 1000 milliLiter(s) (50 mL/Hr) IV Continuous <Continuous>  dextrose 50% Injectable 12.5 Gram(s) IV Push once  dextrose 50% Injectable 25 Gram(s) IV Push once  docusate sodium 100 milliGRAM(s) Oral two times a day  furosemide   Injectable 40 milliGRAM(s) IV Push two times a day  heparin  Infusion 1000 Unit(s)/Hr (10 mL/Hr) IV Continuous <Continuous>  insulin glargine Injectable (LANTUS) 25 Unit(s) SubCutaneous at bedtime  insulin lispro (HumaLOG) corrective regimen sliding scale   SubCutaneous three times a day before meals  insulin lispro (HumaLOG) corrective regimen sliding scale   SubCutaneous at bedtime  nitroglycerin  Infusion 20 MICROgram(s)/Min (6 mL/Hr) IV Continuous <Continuous>  pantoprazole    Tablet 40 milliGRAM(s) Oral before breakfast  senna 2 Tablet(s) Oral at bedtime  sodium chloride 0.9% lock flush 3 milliLiter(s) IV Push every 8 hours  ticagrelor 90 milliGRAM(s) Oral every 12 hours    MEDICATIONS  (PRN):  dextrose 40% Gel 15 Gram(s) Oral once PRN Blood Glucose LESS THAN 70 milliGRAM(s)/deciliter  glucagon  Injectable 1 milliGRAM(s) IntraMuscular once PRN Glucose LESS THAN 70 milligrams/deciliter      Vital Signs Last 24 Hrs  T(C): 36.2 (20 Jun 2019 08:00), Max: 36.2 (19 Jun 2019 11:11)  T(F): 97.2 (20 Jun 2019 08:00), Max: 97.2 (19 Jun 2019 11:11)  HR: 81 (20 Jun 2019 10:00) (69 - 85)  BP: 116/56 (20 Jun 2019 10:00) (106/47 - 160/86)  BP(mean): 69 (20 Jun 2019 10:00) (62 - 132)  RR: 21 (20 Jun 2019 10:00) (13 - 27)  SpO2: 96% (20 Jun 2019 10:00) (96% - 100%)  CAPILLARY BLOOD GLUCOSE      POCT Blood Glucose.: 150 mg/dL (20 Jun 2019 08:01)  POCT Blood Glucose.: 98 mg/dL (19 Jun 2019 21:44)  POCT Blood Glucose.: 175 mg/dL (19 Jun 2019 15:13)  POCT Blood Glucose.: 216 mg/dL (19 Jun 2019 11:35)    I&O's Summary    19 Jun 2019 07:01  -  20 Jun 2019 07:00  --------------------------------------------------------  IN: 210 mL / OUT: 3200 mL / NET: -2990 mL    20 Jun 2019 07:01  -  20 Jun 2019 10:52  --------------------------------------------------------  IN: 228 mL / OUT: 500 mL / NET: -272 mL        PHYSICAL EXAM:  GENERAL: NAD   CHEST/LUNG: CTABL ; No wheeze  HEART: RRR; flow murmur; IABP  ABDOMEN: Soft, Nontender, Nondistended; Bowel sounds present  :  female condom cath  EXTREMITIES:  No edema  PSYCH: AAOx3  NEUROLOGY: no gross focal deficits    LABS:                        9.0    11.86 )-----------( 264      ( 20 Jun 2019 10:30 )             28.6     06-20    137  |  99  |  49<H>  ----------------------------<  138<H>  4.3   |  24  |  1.84<H>    Ca    8.6      20 Jun 2019 03:45  Phos  4.4     06-20  Mg     2.5     06-20    TPro  6.6  /  Alb  2.9<L>  /  TBili  0.4  /  DBili  x   /  AST  18  /  ALT  15  /  AlkPhos  102  06-19    PT/INR - ( 19 Jun 2019 21:17 )   PT: 12.3 SEC;   INR: 1.10          PTT - ( 19 Jun 2019 21:17 )  PTT:31.9 SEC          Microbiology;        RADIOLOGY & ADDITIONAL TESTS:

## 2019-06-20 NOTE — PROGRESS NOTE ADULT - PROBLEM SELECTOR PLAN 3
Evaluation and work up regarding the liver mass found on CT scan  - ICD is NOT MRI compatible  -cardiac revascularization is pending a possible liver biospy/intervention, patient will be unable to start dual antiplatelet therapy prior to liver biopsy/intervention  - given recent stents in Feb, will have to discuss whether patient can be off dual anti-platelet therapy for 5d prior to possible intervention  -cardiology would also like pathology of the liver lesion prior to revascularization.

## 2019-06-20 NOTE — PROGRESS NOTE ADULT - ATTENDING COMMENTS
Patient was seen and examined,interim events noted,labs and radiology studies reviewed.  Salvador Hood MD,FACC.  [address] [place] [address].  [id number].  [phone]

## 2019-06-20 NOTE — PROGRESS NOTE ADULT - ASSESSMENT
73 year old female with HTN, hyperlipidemia, DM-II, CHF (EF 20%) w/ ICD in left chest, and CAD with multiple PTCA/stents, most recent Feb 2019 who presented to  ED 6/16/19 complaining of abdominal pain x 1 week, found to have 2 hepatic lesion on CT non-contrast, c/b NSTEMI transferred to [facility] for catheterization with 99% Left main and 100% LAD, with IABP placed on nitro gtt &hep gtt. Briefly on lasix gtt, transitioned to IVP. Awaiting for liver lesion workup prior to potential staged PCI

## 2019-06-20 NOTE — CONSULT NOTE ADULT - SUBJECTIVE AND OBJECTIVE BOX
HEPATOLOGY INITIAL CONSULT:    HPI:  Patient is a 73 year old female with PMH significant for CAD with multiple stents (most recent in 2019), systolic heart failure, EF 20% s/p ICD, HTN, HLD, DM, presented to [facility] on  with abdominal pain. In ER, patient had abnormal EKG and elevated troponin with an RUTH. Patient was admitted to [facility] with NSTEMI and ACS protocol initiated with heparin gtt. Due to elevated creatinine, cath was not performed urgently. Patient also with an anemia. [facility] also performed a CT Abd/pelvis and patient was found to have 2 hepatic lesions in the lateral segment of the left hepatic lobe, will need further testing to determine hepatic carcinoma vs. angiomyolipoma vs focal nodular hyperplasia (benign tumor). Pacemaker precludes MRI. At [facility], GI Recommend contrast-enhanced multiphase CT with liver protocol. Patient was transferred to [facility]  for diagnostic cardiac catheterization in  light of patients CAD history, symptoms and abnormal noninvasive test findings. In cath lab, patient was found to have 99% left main with 100% LAD, received intra-aortic balloon pump and was transferred to CCU post cath for further management.  - Hepatitis workup pending  - hepatology consulted to see if patient possibly needs a liver biopsy prior to stenting and committing to DAPT      Outpatient GI Provider:    PAST MEDICAL & SURGICAL HISTORY:  Liver lesion  Stented coronary artery:  at [facility]  2019 [facility]  NSTEMI (non-ST elevated myocardial infarction)  Diabetes mellitus type 2 in nonobese  Constipation  Anemia  RUTH (acute kidney injury)  CAD (coronary artery disease)  Chronic CHF: Systolic EF 20%  Hyperlipidemia, unspecified hyperlipidemia type  Hypertension, unspecified type  Implantable cardioverter-defibrillator (ICD) in situ      Review of Systems: Negative except as per HPI.    MEDICATIONS  (STANDING):  aspirin enteric coated 81 milliGRAM(s) Oral daily  atorvastatin 40 milliGRAM(s) Oral at bedtime  chlorhexidine 4% Liquid 1 Application(s) Topical <User Schedule>  dextrose 5%. 1000 milliLiter(s) (50 mL/Hr) IV Continuous <Continuous>  dextrose 50% Injectable 12.5 Gram(s) IV Push once  dextrose 50% Injectable 25 Gram(s) IV Push once  docusate sodium 100 milliGRAM(s) Oral two times a day  furosemide Infusion 10 mG/Hr (5 mL/Hr) IV Continuous <Continuous>  heparin  Infusion 1000 Unit(s)/Hr (10 mL/Hr) IV Continuous <Continuous>  insulin glargine Injectable (LANTUS) 25 Unit(s) SubCutaneous at bedtime  insulin lispro (HumaLOG) corrective regimen sliding scale   SubCutaneous three times a day before meals  insulin lispro (HumaLOG) corrective regimen sliding scale   SubCutaneous at bedtime  nitroglycerin  Infusion 20 MICROgram(s)/Min (6 mL/Hr) IV Continuous <Continuous>  pantoprazole    Tablet 40 milliGRAM(s) Oral before breakfast  senna 2 Tablet(s) Oral at bedtime  sodium chloride 0.9% lock flush 3 milliLiter(s) IV Push every 8 hours  ticagrelor 90 milliGRAM(s) Oral every 12 hours    MEDICATIONS  (PRN):  dextrose 40% Gel 15 Gram(s) Oral once PRN Blood Glucose LESS THAN 70 milliGRAM(s)/deciliter  glucagon  Injectable 1 milliGRAM(s) IntraMuscular once PRN Glucose LESS THAN 70 milligrams/deciliter      ALLERGIES:      SOCIAL HISTORY:  - Alcohol:  - Recreational drug use:    FAMILY HISTORY:  Family history of heart attack (Father):  74yo      Vital Signs Last 24 Hrs  T(C): 36.2 (2019 08:00), Max: 36.2 (2019 11:11)  T(F): 97.2 (2019 08:00), Max: 97.2 (2019 11:11)  HR: 81 (2019 09:00) (69 - 85)  BP: 123/70 (2019 09:00) (106/47 - 160/86)  BP(mean): 84 (2019 09:00) (62 - 132)  RR: 24 (2019 09:00) (13 - 27)  SpO2: 100% (2019 09:00) (97% - 100%)    PHYSICAL EXAM:                  LABS:                        8.4    10.58 )-----------( 266      ( 2019 03:45 )             26.0     06-20    137  |  99  |  49<H>  ----------------------------<  138<H>  4.3   |  24  |  1.84<H>    Ca    8.6      2019 03:45  Phos  4.4     06-20  Mg     2.5     -20    TPro  6.6  /  Alb  2.9<L>  /  TBili  0.4  /  DBili  x   /  AST  18  /  ALT  15  /  AlkPhos  102  -    PT/INR - ( 2019 21:17 )   PT: 12.3 SEC;   INR: 1.10          PTT - ( 2019 21:17 )  PTT:31.9 SEC  LIVER FUNCTIONS - ( 2019 21:17 )  Alb: 2.9 g/dL / Pro: 6.6 g/dL / ALK PHOS: 102 u/L / ALT: 15 u/L / AST: 18 u/L / GGT: x             RADIOLOGY & ADDITIONAL STUDIES: HEPATOLOGY INITIAL CONSULT:    HPI:  Patient is a 73 year old female with PMH significant for CAD with multiple stents (most recent in 2019), systolic heart failure, EF 20% s/p ICD, HTN, HLD, DM, presented to [facility] on  with abdominal pain. In ER, patient had abnormal EKG and elevated troponin with an RUTH. Patient was admitted to [facility] with NSTEMI and ACS protocol initiated with heparin gtt. Due to elevated creatinine, cath was not performed urgently. Patient also with an anemia. [facility] also performed a CT Abd/pelvis and patient was found to have 2 hepatic lesions in the lateral segment of the left hepatic lobe, will need further testing to determine hepatic carcinoma vs. angiomyolipoma vs focal nodular hyperplasia (benign tumor). Pacemaker precludes MRI. At [facility], GI Recommend contrast-enhanced multiphase CT with liver protocol. Patient was transferred to [facility]  for diagnostic cardiac catheterization in  light of patients CAD history, symptoms and abnormal noninvasive test findings. In cath lab, patient was found to have 99% left main with 100% LAD, received intra-aortic balloon pump and was transferred to CCU post cath for further management.    - * ICD is not MRI compatible *  - Hepatitis workup pending  - Hepatology consulted to see if patient possibly needs a liver biopsy prior to stenting and committing to DAPT        PAST MEDICAL & SURGICAL HISTORY:  Liver lesion  Stented coronary artery: 2011 at [facility]  2019 [facility]  NSTEMI (non-ST elevated myocardial infarction)  Diabetes mellitus type 2 in nonobese  Constipation  Anemia  RUTH (acute kidney injury)  CAD (coronary artery disease)  Chronic CHF: Systolic EF 20%  Hyperlipidemia, unspecified hyperlipidemia type  Hypertension, unspecified type  Implantable cardioverter-defibrillator (ICD) in situ      Review of Systems: Negative except as per HPI.    MEDICATIONS  (STANDING):  aspirin enteric coated 81 milliGRAM(s) Oral daily  atorvastatin 40 milliGRAM(s) Oral at bedtime  chlorhexidine 4% Liquid 1 Application(s) Topical <User Schedule>  dextrose 5%. 1000 milliLiter(s) (50 mL/Hr) IV Continuous <Continuous>  dextrose 50% Injectable 12.5 Gram(s) IV Push once  dextrose 50% Injectable 25 Gram(s) IV Push once  docusate sodium 100 milliGRAM(s) Oral two times a day  furosemide Infusion 10 mG/Hr (5 mL/Hr) IV Continuous <Continuous>  heparin  Infusion 1000 Unit(s)/Hr (10 mL/Hr) IV Continuous <Continuous>  insulin glargine Injectable (LANTUS) 25 Unit(s) SubCutaneous at bedtime  insulin lispro (HumaLOG) corrective regimen sliding scale   SubCutaneous three times a day before meals  insulin lispro (HumaLOG) corrective regimen sliding scale   SubCutaneous at bedtime  nitroglycerin  Infusion 20 MICROgram(s)/Min (6 mL/Hr) IV Continuous <Continuous>  pantoprazole    Tablet 40 milliGRAM(s) Oral before breakfast  senna 2 Tablet(s) Oral at bedtime  sodium chloride 0.9% lock flush 3 milliLiter(s) IV Push every 8 hours  ticagrelor 90 milliGRAM(s) Oral every 12 hours    MEDICATIONS  (PRN):  dextrose 40% Gel 15 Gram(s) Oral once PRN Blood Glucose LESS THAN 70 milliGRAM(s)/deciliter  glucagon  Injectable 1 milliGRAM(s) IntraMuscular once PRN Glucose LESS THAN 70 milligrams/deciliter      ALLERGIES:      SOCIAL HISTORY:  - Alcohol:  - Recreational drug use:    FAMILY HISTORY:  Family history of heart attack (Father):  74yo      Vital Signs Last 24 Hrs  T(C): 36.2 (2019 08:00), Max: 36.2 (2019 11:11)  T(F): 97.2 (2019 08:00), Max: 97.2 (2019 11:11)  HR: 81 (2019 09:00) (69 - 85)  BP: 123/70 (2019 09:00) (106/47 - 160/86)  BP(mean): 84 (2019 09:00) (62 - 132)  RR: 24 (2019 09:00) (13 - 27)  SpO2: 100% (2019 09:00) (97% - 100%)    PHYSICAL EXAM:                  LABS:                        8.4    10.58 )-----------( 266      ( 2019 03:45 )             26.0     06-20    137  |  99  |  49<H>  ----------------------------<  138<H>  4.3   |  24  |  1.84<H>    Ca    8.6      2019 03:45  Phos  4.4     06-20  Mg     2.5     -20    TPro  6.6  /  Alb  2.9<L>  /  TBili  0.4  /  DBili  x   /  AST  18  /  ALT  15  /  AlkPhos  102  -    PT/INR - ( 2019 21:17 )   PT: 12.3 SEC;   INR: 1.10          PTT - ( 2019 21:17 )  PTT:31.9 SEC  LIVER FUNCTIONS - ( 2019 21:17 )  Alb: 2.9 g/dL / Pro: 6.6 g/dL / ALK PHOS: 102 u/L / ALT: 15 u/L / AST: 18 u/L / GGT: x             RADIOLOGY & ADDITIONAL STUDIES:  < from: CT Abdomen and Pelvis No Cont (19 @ 12:25) >  IMPRESSION:     Evaluation of the solid organs and GI tract is limited without oral and   IV contrast.    2 hepatic lesions in the lateral segment left hepatic lobe containing   macroscopic fat. Consider hepatocellular carcinoma if patient is   cirrhotic. Otherwise, angiomyolipoma or an FNH could be considered. Other   etiologies are not excluded. Pacemaker precludes MRI. Recommend   contrast-enhanced multiphase CT with liver protocol.    No retroperitoneal hematoma or intra-abdominal hemorrhage.    Bilateral pleural effusions. Pacemaker.    Fibroid uterus.    Severe acquired L4-5 spinal stenosis.    < end of copied text > HEPATOLOGY INITIAL CONSULT:    HPI:  Patient is a 73 year old female with PMH significant for CAD with multiple stents (most recent in 2019), systolic heart failure, EF 20% s/p ICD, HTN, HLD, DM, presented to [facility] on  with abdominal pain. In ER, patient had abnormal EKG and elevated troponin with an RUTH. Patient was admitted to [facility] with NSTEMI and ACS protocol initiated with heparin gtt. Due to elevated creatinine, cath was not performed urgently. Patient also with an anemia. [facility] also performed a CT Abd/pelvis and patient was found to have 2 hepatic lesions in the lateral segment of the left hepatic lobe, will need further testing to determine hepatic carcinoma vs. angiomyolipoma vs focal nodular hyperplasia (benign tumor). Pacemaker precludes MRI. At [facility], GI Recommend contrast-enhanced multiphase CT with liver protocol. Patient was transferred to [facility]  for diagnostic cardiac catheterization in  light of patients CAD history, symptoms and abnormal noninvasive test findings. In cath lab, patient was found to have 99% left main with 100% LAD, received intra-aortic balloon pump and was transferred to CCU post cath for further management.    - * ICD is not MRI compatible *  - Hepatitis workup pending  - Hepatology consulted to see if patient possibly needs a liver biopsy prior to stenting and committing to DAPT        PAST MEDICAL & SURGICAL HISTORY:  Liver lesion  Stented coronary artery: 2011 at [facility]  2019 [facility]  NSTEMI (non-ST elevated myocardial infarction)  Diabetes mellitus type 2 in nonobese  Constipation  Anemia  RUTH (acute kidney injury)  CAD (coronary artery disease)  Chronic CHF: Systolic EF 20%  Hyperlipidemia, unspecified hyperlipidemia type  Hypertension, unspecified type  Implantable cardioverter-defibrillator (ICD) in situ      Review of Systems: Negative except as per HPI.    MEDICATIONS  (STANDING):  aspirin enteric coated 81 milliGRAM(s) Oral daily  atorvastatin 40 milliGRAM(s) Oral at bedtime  chlorhexidine 4% Liquid 1 Application(s) Topical <User Schedule>  dextrose 5%. 1000 milliLiter(s) (50 mL/Hr) IV Continuous <Continuous>  dextrose 50% Injectable 12.5 Gram(s) IV Push once  dextrose 50% Injectable 25 Gram(s) IV Push once  docusate sodium 100 milliGRAM(s) Oral two times a day  furosemide Infusion 10 mG/Hr (5 mL/Hr) IV Continuous <Continuous>  heparin  Infusion 1000 Unit(s)/Hr (10 mL/Hr) IV Continuous <Continuous>  insulin glargine Injectable (LANTUS) 25 Unit(s) SubCutaneous at bedtime  insulin lispro (HumaLOG) corrective regimen sliding scale   SubCutaneous three times a day before meals  insulin lispro (HumaLOG) corrective regimen sliding scale   SubCutaneous at bedtime  nitroglycerin  Infusion 20 MICROgram(s)/Min (6 mL/Hr) IV Continuous <Continuous>  pantoprazole    Tablet 40 milliGRAM(s) Oral before breakfast  senna 2 Tablet(s) Oral at bedtime  sodium chloride 0.9% lock flush 3 milliLiter(s) IV Push every 8 hours  ticagrelor 90 milliGRAM(s) Oral every 12 hours    MEDICATIONS  (PRN):  dextrose 40% Gel 15 Gram(s) Oral once PRN Blood Glucose LESS THAN 70 milliGRAM(s)/deciliter  glucagon  Injectable 1 milliGRAM(s) IntraMuscular once PRN Glucose LESS THAN 70 milligrams/deciliter      ALLERGIES:      SOCIAL HISTORY:  - Alcohol:  - Recreational drug use:    FAMILY HISTORY:  Family history of heart attack (Father):  74yo      Vital Signs Last 24 Hrs  T(C): 36.2 (2019 08:00), Max: 36.2 (2019 11:11)  T(F): 97.2 (2019 08:00), Max: 97.2 (2019 11:11)  HR: 81 (2019 09:00) (69 - 85)  BP: 123/70 (2019 09:00) (106/47 - 160/86)  BP(mean): 84 (2019 09:00) (62 - 132)  RR: 24 (2019 09:00) (13 - 27)  SpO2: 100% (2019 09:00) (97% - 100%)      PHYSICAL EXAM:  GENERAL: NAD, well-developed  HEAD:  Atraumatic, Normocephalic  EYES: EOMI, PERRLA, conjunctiva and sclera clear and non-icteric  NECK: Supple, No JVD  CHEST/LUNG: Clear to auscultation bilaterally; No wheeze  HEART: Regular rate and rhythm; No murmurs, rubs, or gallops  ABDOMEN: Soft, Nontender, Nondistended; Bowel sounds present  EXTREMITIES:  2+ Peripheral Pulses, No clubbing, cyanosis, or edema  PSYCH: AAOx3  NEUROLOGY: non-focal  SKIN: No rashes or lesions                LABS:                        8.4    10.58 )-----------( 266      ( 2019 03:45 )             26.0     06-20    137  |  99  |  49<H>  ----------------------------<  138<H>  4.3   |  24  |  1.84<H>    Ca    8.6      2019 03:45  Phos  4.4     -20  Mg     2.5         TPro  6.6  /  Alb  2.9<L>  /  TBili  0.4  /  DBili  x   /  AST  18  /  ALT  15  /  AlkPhos  102      PT/INR - ( 2019 21:17 )   PT: 12.3 SEC;   INR: 1.10          PTT - ( 2019 21:17 )  PTT:31.9 SEC  LIVER FUNCTIONS - ( 2019 21:17 )  Alb: 2.9 g/dL / Pro: 6.6 g/dL / ALK PHOS: 102 u/L / ALT: 15 u/L / AST: 18 u/L / GGT: x             RADIOLOGY & ADDITIONAL STUDIES:  < from: CT Abdomen and Pelvis No Cont (19 @ 12:25) >  IMPRESSION:     Evaluation of the solid organs and GI tract is limited without oral and   IV contrast.    2 hepatic lesions in the lateral segment left hepatic lobe containing   macroscopic fat. Consider hepatocellular carcinoma if patient is   cirrhotic. Otherwise, angiomyolipoma or an FNH could be considered. Other   etiologies are not excluded. Pacemaker precludes MRI. Recommend   contrast-enhanced multiphase CT with liver protocol.    No retroperitoneal hematoma or intra-abdominal hemorrhage.    Bilateral pleural effusions. Pacemaker.    Fibroid uterus.    Severe acquired L4-5 spinal stenosis.    < end of copied text > HEPATOLOGY INITIAL CONSULT:    HPI:  Patient is a 73 year old female with PMH significant for CAD with multiple stents (most recent in 2019), systolic heart failure, EF 20% s/p ICD, HTN, HLD, DM, presented to [facility] on  with abdominal pain. In ER, patient had abnormal EKG and elevated troponin with an RUTH. Patient was admitted to [facility] with NSTEMI and ACS protocol initiated with heparin gtt. Due to elevated creatinine, cath was not performed urgently. Patient also with an anemia. [facility] also performed a CT Abd/pelvis and patient was found to have 2 hepatic lesions in the lateral segment of the left hepatic lobe, will need further testing to determine hepatic carcinoma vs. angiomyolipoma vs focal nodular hyperplasia (benign tumor). Pacemaker precludes MRI. At [facility], GI Recommend contrast-enhanced multiphase CT with liver protocol. Patient was transferred to [facility]  for diagnostic cardiac catheterization in  light of patients CAD history, symptoms and abnormal noninvasive test findings. In cath lab, patient was found to have 99% left main with 100% LAD, received intra-aortic balloon pump and was transferred to CCU post cath for further management.    - * ICD is not MRI compatible *  - Hepatitis workup pending  - Hepatology consulted to see if patient possibly needs a liver biopsy prior to stenting and committing to DAPT        PAST MEDICAL & SURGICAL HISTORY:  Liver lesion  Stented coronary artery: 2011 at [facility]  2019 [facility]  NSTEMI (non-ST elevated myocardial infarction)  Diabetes mellitus type 2 in nonobese  Constipation  Anemia  RUTH (acute kidney injury)  CAD (coronary artery disease)  Chronic CHF: Systolic EF 20%  Hyperlipidemia, unspecified hyperlipidemia type  Hypertension, unspecified type  Implantable cardioverter-defibrillator (ICD) in situ      Review of Systems:  + early satiety for about 3 months, since February hospitalization, mild nausea and early satiety contributing to lesser meal intake although patient is unsure for how long shes been eating smaller meals. No abdominal pain. + Dark stool but in the setting of taking iron pills.       MEDICATIONS  (STANDING):  aspirin enteric coated 81 milliGRAM(s) Oral daily  atorvastatin 40 milliGRAM(s) Oral at bedtime  chlorhexidine 4% Liquid 1 Application(s) Topical <User Schedule>  dextrose 5%. 1000 milliLiter(s) (50 mL/Hr) IV Continuous <Continuous>  dextrose 50% Injectable 12.5 Gram(s) IV Push once  dextrose 50% Injectable 25 Gram(s) IV Push once  docusate sodium 100 milliGRAM(s) Oral two times a day  furosemide Infusion 10 mG/Hr (5 mL/Hr) IV Continuous <Continuous>  heparin  Infusion 1000 Unit(s)/Hr (10 mL/Hr) IV Continuous <Continuous>  insulin glargine Injectable (LANTUS) 25 Unit(s) SubCutaneous at bedtime  insulin lispro (HumaLOG) corrective regimen sliding scale   SubCutaneous three times a day before meals  insulin lispro (HumaLOG) corrective regimen sliding scale   SubCutaneous at bedtime  nitroglycerin  Infusion 20 MICROgram(s)/Min (6 mL/Hr) IV Continuous <Continuous>  pantoprazole    Tablet 40 milliGRAM(s) Oral before breakfast  senna 2 Tablet(s) Oral at bedtime  sodium chloride 0.9% lock flush 3 milliLiter(s) IV Push every 8 hours  ticagrelor 90 milliGRAM(s) Oral every 12 hours    MEDICATIONS  (PRN):  dextrose 40% Gel 15 Gram(s) Oral once PRN Blood Glucose LESS THAN 70 milliGRAM(s)/deciliter  glucagon  Injectable 1 milliGRAM(s) IntraMuscular once PRN Glucose LESS THAN 70 milligrams/deciliter      ALLERGIES:      SOCIAL HISTORY:  - No Smoking  - No Alcohol  - No Recreational drug use  - No history of obesity      FAMILY HISTORY:  Family history of heart attack (Father):  74yo      Vital Signs Last 24 Hrs  T(C): 36.2 (2019 08:00), Max: 36.2 (2019 11:11)  T(F): 97.2 (2019 08:00), Max: 97.2 (2019 11:11)  HR: 81 (2019 09:00) (69 - 85)  BP: 123/70 (2019 09:00) (106/47 - 160/86)  BP(mean): 84 (2019 09:00) (62 - 132)  RR: 24 (2019 09:00) (13 - 27)  SpO2: 100% (2019 09:00) (97% - 100%)      PHYSICAL EXAM:  GENERAL: NAD, well-developed  HEAD:  Atraumatic, Normocephalic  EYES: EOMI, PERRLA, conjunctiva and sclera clear and non-icteric  NECK: Supple, No JVD  CHEST/LUNG: Clear to auscultation bilaterally; No wheeze  HEART: Regular rate and rhythm; No murmurs, rubs, or gallops  ABDOMEN: Soft, Nontender, Nondistended; Bowel sounds present  EXTREMITIES:  2+ Peripheral Pulses, No clubbing, cyanosis, or edema  PSYCH: AAOx3  NEUROLOGY: non-focal  SKIN: No rashes or lesions        LABS:                        8.4    10.58 )-----------( 266      ( 2019 03:45 )             26.0     06-20    137  |  99  |  49<H>  ----------------------------<  138<H>  4.3   |  24  |  1.84<H>    Ca    8.6      2019 03:45  Phos  4.4       Mg     2.5         TPro  6.6  /  Alb  2.9<L>  /  TBili  0.4  /  DBili  x   /  AST  18  /  ALT  15  /  AlkPhos  102      PT/INR - ( 2019 21:17 )   PT: 12.3 SEC;   INR: 1.10          PTT - ( 2019 21:17 )  PTT:31.9 SEC  LIVER FUNCTIONS - ( 2019 21:17 )  Alb: 2.9 g/dL / Pro: 6.6 g/dL / ALK PHOS: 102 u/L / ALT: 15 u/L / AST: 18 u/L / GGT: x             RADIOLOGY & ADDITIONAL STUDIES:  < from: CT Abdomen and Pelvis No Cont (19 @ 12:25) >  IMPRESSION:     Evaluation of the solid organs and GI tract is limited without oral and   IV contrast.    2 hepatic lesions in the lateral segment left hepatic lobe containing   macroscopic fat. Consider hepatocellular carcinoma if patient is   cirrhotic. Otherwise, angiomyolipoma or an FNH could be considered. Other   etiologies are not excluded. Pacemaker precludes MRI. Recommend   contrast-enhanced multiphase CT with liver protocol.    No retroperitoneal hematoma or intra-abdominal hemorrhage.    Bilateral pleural effusions. Pacemaker.    Fibroid uterus.    Severe acquired L4-5 spinal stenosis.    < end of copied text >

## 2019-06-20 NOTE — PROGRESS NOTE ADULT - SUBJECTIVE AND OBJECTIVE BOX
PRESENTING CC:Chest pain    SUBJ: 73F, from home, w/ a PMHx of HTN, HLD, T2DM, CHF (EF 20%) w/ ICD in left chest, and CAD with stent placed 2019 who presents to the ED with abdominal  and chest pain x 1 week.Transferred to [facility] for cath-had IABP placed remains chest pain free staged PCI planned after work-up for Liver mass,      PMH -reviewed admission note, no change since admission  Heart failure: acute [ ] chronic [ ] acute or chronic [x ] diastolic [ ] systolic [x ] combined systolic and diastolic[ ]  RUTH: ATN[ ] renal medullary necrosis [ ] CKD I [ ]CKDII [ ]CKD III [x ]CKD IV [ ]CKD V [ ]Other pathological lesions [ ]    MEDICATIONS  (STANDING):  aspirin enteric coated 81 milliGRAM(s) Oral daily  atorvastatin 40 milliGRAM(s) Oral at bedtime  chlorhexidine 4% Liquid 1 Application(s) Topical <User Schedule>  docusate sodium 100 milliGRAM(s) Oral two times a day  furosemide Infusion 10 mG/Hr (5 mL/Hr) IV Continuous <Continuous>  heparin  Infusion 1000 Unit(s)/Hr (10 mL/Hr) IV Continuous <Continuous>  insulin glargine Injectable (LANTUS) 25 Unit(s) SubCutaneous at bedtime  insulin lispro (HumaLOG) corrective regimen sliding scale   SubCutaneous three times a day before meals  insulin lispro (HumaLOG) corrective regimen sliding scale   SubCutaneous at bedtime  nitroglycerin  Infusion 20 MICROgram(s)/Min (6 mL/Hr) IV Continuous <Continuous>  pantoprazole    Tablet 40 milliGRAM(s) Oral before breakfast  senna 2 Tablet(s) Oral at bedtime  sodium chloride 0.9% lock flush 3 milliLiter(s) IV Push every 8 hours  ticagrelor 90 milliGRAM(s) Oral every 12 hours    MEDICATIONS  (PRN):  dextrose 40% Gel 15 Gram(s) Oral once PRN Blood Glucose LESS THAN 70 milliGRAM(s)/deciliter  glucagon  Injectable 1 milliGRAM(s) IntraMuscular once PRN Glucose LESS THAN 70 milligrams/deciliter          FAMILY HISTORY:  Family history of heart attack (Father):  74yo    No family history of premature coronary artery disease or sudden cardiac death      REVIEW OF SYSTEMS:  Constitutional: [ ] fever, [ ]weight loss,  [x ]fatigue  Eyes: [ ] visual changes  Respiratory: [ ]shortness of breath;  [ ] cough, [ ]wheezing, [ ]chills, [ ]hemoptysis  Cardiovascular: [ ] chest pain, [ ]palpitations, [ ]dizziness,  [ ]leg swelling[ ]orthopnea[ ]PND  Gastrointestinal: [ ] abdominal pain, [ ]nausea, [ ]vomiting,  [ ]diarrhea   Genitourinary: [ ] dysuria, [ ] hematuria  Neurologic: [ ] headaches [ ] tremors[ ]weakness  Skin: [ ] itching, [ ]burning, [ ] rashes  Endocrine: [ ] heat or cold intolerance  Musculoskeletal: [ ] joint pain or swelling; [ ] muscle, back, or extremity pain  Psychiatric: [ ] depression, [ ]anxiety, [ ]mood swings, or [ ]difficulty sleeping  Hematologic: [ ] easy bruising, [ ] bleeding gums    [x] All remaining systems negative except as per above.   [ ]Unable to obtain.    Vital Signs Last 24 Hrs  T(C): 36 (2019 04:00), Max: 36.2 (2019 11:11)  T(F): 96.8 (2019 04:00), Max: 97.2 (2019 11:11)  HR: 80 (2019 06:00) (69 - 85)  BP: 130/72 (2019 06:00) (114/62 - 160/86)  BP(mean): 86 (2019 06:00) (72 - 132)  RR: 27 (2019 06:00) (13 - 27)  SpO2: 100% (2019 06:00) (100% - 100%)  I&O's Summary    2019 07:01  -  2019 07:00  --------------------------------------------------------  IN: 210 mL / OUT: 3200 mL / NET: -2990 mL        PHYSICAL EXAM:  General: No acute distress BMI-24.4  HEENT: EOMI, PERRL  Neck: Supple, [ ] JVD  Lungs: Equal air entry bilaterally; [ ] rales [ ] wheezing [ ] rhonchi  Heart: Regular rate and rhythm; [x ] murmur   2/6 [x ] systolic [ ] diastolic [ ] radiation[ ] rubs [ ]  gallops  Abdomen: Nontender, bowel sounds present  Extremities: No clubbing, cyanosis, [ ] edema  Nervous system:  Alert & Oriented X3, no focal deficits  Psychiatric: Normal affect  Skin: No rashes or lesions    LABS:      137  |  99  |  49<H>  ----------------------------<  138<H>  4.3   |  24  |  1.84<H>    Ca    8.6      2019 03:45  Phos  4.4       Mg     2.5         TPro  6.6  /  Alb  2.9<L>  /  TBili  0.4  /  DBili  x   /  AST  18  /  ALT  15  /  AlkPhos  102      Creatinine Trend: 1.84<--, 1.78<--, 2.19<--, 2.04<--, 1.91<--, 2.12<--                        8.4    10.58 )-----------( 266      ( 2019 03:45 )             26.0     PT/INR - ( 2019 21:17 )   PT: 12.3 SEC;   INR: 1.10          PTT - ( 2019 21:17 )  PTT:31.9 SEC      RADIOLOGY:  CT ABDOMEN AND PELVIS      IMPRESSION: 2 hepatic lesions in the lateral segment left hepatic lobe containing  macroscopic fat.   Consider hepatocellular carcinoma if patient is  cirrhotic.   Otherwise, angiomyolipoma or an FNH could be considered.   Other  etiologies are not excluded.  No retroperitoneal hematoma or intra-abdominal hemorrhage.  Bilateral pleural effusions. Pacemaker.  Fibroid uterus.  Severe acquired L4-5 spinal stenosis.      IMPRESSION AND PLAN:    Problem/Recommendation - 1:  Problem: NSTEMI (non-ST elevated myocardial infarction). Recommendation: Admit to CCU post cardia cathNitro gtt titrate to MAP 65  continue ASA and Brilinta and lipitor 40  control BP with nitro gtt for now, will transition to PO antihypertensives        Problem/Recommendation - 2:  ·  Problem: CAD (coronary artery disease).  Recommendation: Patient will need to be re-vascularized once evaluated by GI for liver mass and a plan is developed        Problem/Recommendation - 3:  ·  Problem: Liver lesion.  Recommendation: Patient will need GI consultation in the AM for further evaluation and work up regarding the liver mass found on CT scan  -cardiac revascularization is pending a possible liver biospy, patient will be unable to start dual antiplatelet therapy prior to liver biopsy/intervention        Problem/Recommendation - 4:  ·  Problem: Hypertension, unspecified type.  Recommendation: nitro gtt for now  holding home meds:  amlodopine 2,5, coreg 3.125 bid,     Problem/Recommendation - 5:  ·  Problem: Hyperlipidemia, unspecified hyperlipidemia type.  Recommendation: lipitor 40 for now, lipid panel done at [facility].     Problem/Recommendation - 6:  Problem: Chronic CHF. Recommendation: Patient will need heart failure optimization.   RIGHT HEART CATH AND SWAN CO 2.85 index 1.79 EDP 34  -She has a SWAN in the right groin, will monitor BO and blood gas  -will initiate lasix 40mg ivp and start lasix gtt@ 10mg/hr   -trend CVP.Problem/Recommendation - 7:  Problem: RUTH (acute kidney injury). Recommendation: will continue to monitor BUN and creatinine  -avoid nephrotoxic agents, patient did receive contrast with cardiac cath.    Problem/Recommendation - 8:  Problem: Anemia. Recommendation: will continue to monitor CBC.    Problem/Recommendation - 9:  Problem: Diabetes mellitus type 2 in nonobese. Recommendation: will continue lantus   25 units qhs, (home dose is 35units) and fingersticks QAC and QHS, as well as ISS.    Problem/Recommendation - 10:  Problem: VTE (venous thromboembolism). Recommendation: she will be on a heparin gtt for balloon pump 1/:, this will also be sufficient for VTE prophylaxis.

## 2019-06-20 NOTE — CONSULT NOTE ADULT - ASSESSMENT
73 year old female with CKd stage 4, HTN, hyperlipidemia, DM-II, CHF (EF 20%) w/ ICD in left chest, and CAD with multiple PTCA/stents, most recent Feb 2019 who transfer from [facility] for NSTEMI s/p cath found to have 99% LM and 100% LAD received intra-aortic balloon pump and was transferred to CCU post cath for further management. also noted to have liver lesion likely need biopsy     CKD stage 4  baseline ~ 1.7-1.9  fluctuates slightly likely sec to CHF  CKD likely sec to DM/HTN/CHF  s/p cath 6/19  renal function stable   avoid nephrotoxic agents  monitor bmp    HTN  controlled  monitor    proteinuria  check urine p/c ratio  likely sec to DM  vasculitis work up ordered outpatient but never done.     NSTEMI  f/u cardio    CKD-MBD  check PTH, phos level  monitor phos and calcium daily 73 year old female with CKd stage 4, HTN, hyperlipidemia, DM-II, CHF (EF 20%) w/ ICD in left chest, and CAD with multiple PTCA/stents, most recent Feb 2019 who transfer from [facility] for NSTEMI s/p cath found to have 99% LM and 100% LAD received intra-aortic balloon pump and was transferred to CCU post cath for further management. also noted to have liver lesion likely need biopsy     CKD stage 4  baseline ~ 1.7-1.9  fluctuates slightly likely sec to CHF  CKD likely sec to DM/HTN/CHF  s/p cath 6/19  renal function stable   avoid nephrotoxic agents  monitor bmp    HTN  controlled  monitor BP    proteinuria  check urine p/c ratio  likely sec to DM  vasculitis work up ordered outpatient but never done.     NSTEMI  f/u cardio    CKD-MBD  check PTH, phos level  monitor phos and calcium daily

## 2019-06-20 NOTE — PROGRESS NOTE ADULT - PROBLEM SELECTOR PLAN 6
Patient will need heart failure optimization.   RIGHT HEART CATH AND SWAN CO 2.85 index 1.79 EDP 34  -She has a SWAN in the right groin, will monitor BO and blood gas  -will initiate lasix 40mg ivp and start lasix gtt@ 10mg/hr   -trend CVP.

## 2019-06-20 NOTE — PROGRESS NOTE ADULT - PROBLEM SELECTOR PLAN 7
will continue to monitor BUN and creatinine  - avoid nephrotoxic agents, patient did receive contrast with cardiac cath  - will trend Cr prior to getting possible CT triple phase w/ contrast, if necessary

## 2019-06-20 NOTE — PROGRESS NOTE ADULT - PROBLEM SELECTOR PLAN 1
Admit to CCU post cardia cath  Balloon pump 1:1  Nitro gtt titrate to MAP 65  continue ASA and Brilinta and lipitor 40  control BP with nitro gtt for now, will transition to PO antihypertensives  -currently chest pain free

## 2019-06-21 DIAGNOSIS — R57.0 CARDIOGENIC SHOCK: ICD-10-CM

## 2019-06-21 DIAGNOSIS — I50.23 ACUTE ON CHRONIC SYSTOLIC (CONGESTIVE) HEART FAILURE: ICD-10-CM

## 2019-06-21 LAB
AFP-TM SERPL-MCNC: < 1.8 NG/ML — SIGNIFICANT CHANGE UP
ALBUMIN SERPL ELPH-MCNC: 3.7 G/DL — SIGNIFICANT CHANGE UP (ref 3.3–5)
ALP SERPL-CCNC: 121 U/L — HIGH (ref 40–120)
ALT FLD-CCNC: 21 U/L — SIGNIFICANT CHANGE UP (ref 4–33)
ANION GAP SERPL CALC-SCNC: 15 MMO/L — HIGH (ref 7–14)
APTT BLD: 122 SEC — CRITICAL HIGH (ref 27.5–36.3)
APTT BLD: 85.3 SEC — HIGH (ref 27.5–36.3)
AST SERPL-CCNC: 30 U/L — SIGNIFICANT CHANGE UP (ref 4–32)
BASE EXCESS BLDV CALC-SCNC: 2.9 MMOL/L — SIGNIFICANT CHANGE UP
BASE EXCESS BLDV CALC-SCNC: 5.3 MMOL/L — SIGNIFICANT CHANGE UP
BILIRUB SERPL-MCNC: 0.4 MG/DL — SIGNIFICANT CHANGE UP (ref 0.2–1.2)
BLOOD GAS VENOUS - CREATININE: 1.79 MG/DL — HIGH (ref 0.5–1.3)
BLOOD GAS VENOUS - CREATININE: 1.92 MG/DL — HIGH (ref 0.5–1.3)
BLOOD GAS VENOUS - FIO2: 30 — SIGNIFICANT CHANGE UP
BUN SERPL-MCNC: 56 MG/DL — HIGH (ref 7–23)
CALCIUM SERPL-MCNC: 8.8 MG/DL — SIGNIFICANT CHANGE UP (ref 8.4–10.5)
CANCER AG19-9 SERPL-ACNC: < 2 U/ML — SIGNIFICANT CHANGE UP
CHLORIDE BLDV-SCNC: 102 MMOL/L — SIGNIFICANT CHANGE UP (ref 96–108)
CHLORIDE BLDV-SCNC: 102 MMOL/L — SIGNIFICANT CHANGE UP (ref 96–108)
CHLORIDE SERPL-SCNC: 97 MMOL/L — LOW (ref 98–107)
CO2 SERPL-SCNC: 25 MMOL/L — SIGNIFICANT CHANGE UP (ref 22–31)
CREAT SERPL-MCNC: 1.99 MG/DL — HIGH (ref 0.5–1.3)
GAS PNL BLDV: 131 MMOL/L — LOW (ref 136–146)
GAS PNL BLDV: 133 MMOL/L — LOW (ref 136–146)
GLUCOSE BLDC GLUCOMTR-MCNC: 168 MG/DL — HIGH (ref 70–99)
GLUCOSE BLDC GLUCOMTR-MCNC: 180 MG/DL — HIGH (ref 70–99)
GLUCOSE BLDC GLUCOMTR-MCNC: 230 MG/DL — HIGH (ref 70–99)
GLUCOSE BLDC GLUCOMTR-MCNC: 286 MG/DL — HIGH (ref 70–99)
GLUCOSE BLDV-MCNC: 206 MG/DL — HIGH (ref 70–99)
GLUCOSE BLDV-MCNC: 280 MG/DL — HIGH (ref 70–99)
GLUCOSE SERPL-MCNC: 213 MG/DL — HIGH (ref 70–99)
HBV CORE IGM SER-ACNC: NONREACTIVE — SIGNIFICANT CHANGE UP
HCO3 BLDV-SCNC: 26 MMOL/L — SIGNIFICANT CHANGE UP (ref 20–27)
HCO3 BLDV-SCNC: 28 MMOL/L — HIGH (ref 20–27)
HCT VFR BLD CALC: 28.7 % — LOW (ref 34.5–45)
HCT VFR BLD CALC: 29.2 % — LOW (ref 34.5–45)
HCT VFR BLDV CALC: 24.9 % — LOW (ref 34.5–45)
HCT VFR BLDV CALC: 28.6 % — LOW (ref 34.5–45)
HGB BLD-MCNC: 9.4 G/DL — LOW (ref 11.5–15.5)
HGB BLD-MCNC: 9.4 G/DL — LOW (ref 11.5–15.5)
HGB BLDV-MCNC: 8 G/DL — LOW (ref 11.5–15.5)
HGB BLDV-MCNC: 9.2 G/DL — LOW (ref 11.5–15.5)
LACTATE BLDV-MCNC: 1.3 MMOL/L — SIGNIFICANT CHANGE UP (ref 0.5–2)
LACTATE BLDV-MCNC: 2.1 MMOL/L — HIGH (ref 0.5–2)
MAGNESIUM SERPL-MCNC: 2.2 MG/DL — SIGNIFICANT CHANGE UP (ref 1.6–2.6)
MCHC RBC-ENTMCNC: 26.7 PG — LOW (ref 27–34)
MCHC RBC-ENTMCNC: 26.7 PG — LOW (ref 27–34)
MCHC RBC-ENTMCNC: 32.2 % — SIGNIFICANT CHANGE UP (ref 32–36)
MCHC RBC-ENTMCNC: 32.8 % — SIGNIFICANT CHANGE UP (ref 32–36)
MCV RBC AUTO: 81.5 FL — SIGNIFICANT CHANGE UP (ref 80–100)
MCV RBC AUTO: 83 FL — SIGNIFICANT CHANGE UP (ref 80–100)
NRBC # FLD: 0.04 K/UL — SIGNIFICANT CHANGE UP (ref 0–0)
NRBC # FLD: 0.05 K/UL — SIGNIFICANT CHANGE UP (ref 0–0)
PCO2 BLDV: 45 MMHG — SIGNIFICANT CHANGE UP (ref 41–51)
PCO2 BLDV: 47 MMHG — SIGNIFICANT CHANGE UP (ref 41–51)
PH BLDV: 7.39 PH — SIGNIFICANT CHANGE UP (ref 7.32–7.43)
PH BLDV: 7.43 PH — SIGNIFICANT CHANGE UP (ref 7.32–7.43)
PHOSPHATE SERPL-MCNC: 4.8 MG/DL — HIGH (ref 2.5–4.5)
PLATELET # BLD AUTO: 234 K/UL — SIGNIFICANT CHANGE UP (ref 150–400)
PLATELET # BLD AUTO: 242 K/UL — SIGNIFICANT CHANGE UP (ref 150–400)
PMV BLD: 10.6 FL — SIGNIFICANT CHANGE UP (ref 7–13)
PMV BLD: 11.5 FL — SIGNIFICANT CHANGE UP (ref 7–13)
PO2 BLDV: 30 MMHG — LOW (ref 35–40)
PO2 BLDV: 36 MMHG — SIGNIFICANT CHANGE UP (ref 35–40)
POTASSIUM BLDV-SCNC: 4 MMOL/L — SIGNIFICANT CHANGE UP (ref 3.4–4.5)
POTASSIUM BLDV-SCNC: 4.1 MMOL/L — SIGNIFICANT CHANGE UP (ref 3.4–4.5)
POTASSIUM SERPL-MCNC: 4.3 MMOL/L — SIGNIFICANT CHANGE UP (ref 3.5–5.3)
POTASSIUM SERPL-SCNC: 4.3 MMOL/L — SIGNIFICANT CHANGE UP (ref 3.5–5.3)
PROT SERPL-MCNC: 7.5 G/DL — SIGNIFICANT CHANGE UP (ref 6–8.3)
RBC # BLD: 3.52 M/UL — LOW (ref 3.8–5.2)
RBC # BLD: 3.52 M/UL — LOW (ref 3.8–5.2)
RBC # FLD: 13.8 % — SIGNIFICANT CHANGE UP (ref 10.3–14.5)
RBC # FLD: 14.1 % — SIGNIFICANT CHANGE UP (ref 10.3–14.5)
SAO2 % BLDV: 49.5 % — LOW (ref 60–85)
SAO2 % BLDV: 59.7 % — LOW (ref 60–85)
SODIUM SERPL-SCNC: 137 MMOL/L — SIGNIFICANT CHANGE UP (ref 135–145)
WBC # BLD: 11.88 K/UL — HIGH (ref 3.8–10.5)
WBC # BLD: 12.16 K/UL — HIGH (ref 3.8–10.5)
WBC # FLD AUTO: 11.88 K/UL — HIGH (ref 3.8–10.5)
WBC # FLD AUTO: 12.16 K/UL — HIGH (ref 3.8–10.5)

## 2019-06-21 PROCEDURE — 92933 PRQ TRLML C ATHRC ST ANGIOP1: CPT | Mod: LM

## 2019-06-21 PROCEDURE — 71045 X-RAY EXAM CHEST 1 VIEW: CPT | Mod: 26

## 2019-06-21 PROCEDURE — 99223 1ST HOSP IP/OBS HIGH 75: CPT | Mod: 25

## 2019-06-21 PROCEDURE — 92978 ENDOLUMINL IVUS OCT C 1ST: CPT | Mod: 26,LM

## 2019-06-21 PROCEDURE — 99233 SBSQ HOSP IP/OBS HIGH 50: CPT

## 2019-06-21 PROCEDURE — 99232 SBSQ HOSP IP/OBS MODERATE 35: CPT | Mod: GC

## 2019-06-21 RX ORDER — FENTANYL CITRATE 50 UG/ML
25 INJECTION INTRAVENOUS ONCE
Refills: 0 | Status: DISCONTINUED | OUTPATIENT
Start: 2019-06-21 | End: 2019-06-21

## 2019-06-21 RX ORDER — SODIUM CHLORIDE 9 MG/ML
250 INJECTION INTRAMUSCULAR; INTRAVENOUS; SUBCUTANEOUS ONCE
Refills: 0 | Status: COMPLETED | OUTPATIENT
Start: 2019-06-21 | End: 2019-06-21

## 2019-06-21 RX ORDER — FENTANYL CITRATE 50 UG/ML
100 INJECTION INTRAVENOUS ONCE
Refills: 0 | Status: DISCONTINUED | OUTPATIENT
Start: 2019-06-21 | End: 2019-06-21

## 2019-06-21 RX ORDER — ACETAMINOPHEN 500 MG
650 TABLET ORAL EVERY 6 HOURS
Refills: 0 | Status: DISCONTINUED | OUTPATIENT
Start: 2019-06-21 | End: 2019-06-30

## 2019-06-21 RX ADMIN — FENTANYL CITRATE 25 MICROGRAM(S): 50 INJECTION INTRAVENOUS at 21:47

## 2019-06-21 RX ADMIN — CHLORHEXIDINE GLUCONATE 1 APPLICATION(S): 213 SOLUTION TOPICAL at 05:22

## 2019-06-21 RX ADMIN — ATORVASTATIN CALCIUM 40 MILLIGRAM(S): 80 TABLET, FILM COATED ORAL at 21:26

## 2019-06-21 RX ADMIN — ISOSORBIDE DINITRATE 10 MILLIGRAM(S): 5 TABLET ORAL at 05:22

## 2019-06-21 RX ADMIN — Medication 650 MILLIGRAM(S): at 20:51

## 2019-06-21 RX ADMIN — ISOSORBIDE DINITRATE 10 MILLIGRAM(S): 5 TABLET ORAL at 21:26

## 2019-06-21 RX ADMIN — TICAGRELOR 90 MILLIGRAM(S): 90 TABLET ORAL at 09:21

## 2019-06-21 RX ADMIN — SODIUM CHLORIDE 3 MILLILITER(S): 9 INJECTION INTRAMUSCULAR; INTRAVENOUS; SUBCUTANEOUS at 05:23

## 2019-06-21 RX ADMIN — Medication 650 MILLIGRAM(S): at 20:21

## 2019-06-21 RX ADMIN — Medication 6: at 17:10

## 2019-06-21 RX ADMIN — FENTANYL CITRATE 25 MICROGRAM(S): 50 INJECTION INTRAVENOUS at 21:17

## 2019-06-21 RX ADMIN — Medication 81 MILLIGRAM(S): at 12:03

## 2019-06-21 RX ADMIN — SODIUM CHLORIDE 3 MILLILITER(S): 9 INJECTION INTRAMUSCULAR; INTRAVENOUS; SUBCUTANEOUS at 21:20

## 2019-06-21 RX ADMIN — SODIUM CHLORIDE 500 MILLILITER(S): 9 INJECTION INTRAMUSCULAR; INTRAVENOUS; SUBCUTANEOUS at 09:51

## 2019-06-21 RX ADMIN — HEPARIN SODIUM 5 UNIT(S)/HR: 5000 INJECTION INTRAVENOUS; SUBCUTANEOUS at 07:37

## 2019-06-21 RX ADMIN — ISOSORBIDE DINITRATE 10 MILLIGRAM(S): 5 TABLET ORAL at 14:15

## 2019-06-21 RX ADMIN — HEPARIN SODIUM 6 UNIT(S)/HR: 5000 INJECTION INTRAVENOUS; SUBCUTANEOUS at 05:23

## 2019-06-21 RX ADMIN — Medication 2: at 12:03

## 2019-06-21 RX ADMIN — TICAGRELOR 90 MILLIGRAM(S): 90 TABLET ORAL at 21:28

## 2019-06-21 RX ADMIN — Medication 2: at 08:00

## 2019-06-21 RX ADMIN — SODIUM CHLORIDE 3 MILLILITER(S): 9 INJECTION INTRAMUSCULAR; INTRAVENOUS; SUBCUTANEOUS at 14:15

## 2019-06-21 RX ADMIN — INSULIN GLARGINE 25 UNIT(S): 100 INJECTION, SOLUTION SUBCUTANEOUS at 21:27

## 2019-06-21 RX ADMIN — Medication 40 MILLIGRAM(S): at 05:22

## 2019-06-21 RX ADMIN — PANTOPRAZOLE SODIUM 40 MILLIGRAM(S): 20 TABLET, DELAYED RELEASE ORAL at 05:22

## 2019-06-21 NOTE — PROGRESS NOTE ADULT - ASSESSMENT
73 year old female with HTN, hyperlipidemia, DM-II, CHF (EF 20%) w/ ICD in left chest, and CAD with multiple PTCA/stents (lastt February 2019) presents with NSTEMI s/p diagnostic cath on 6/19/19 and intra-aortic balloon pump pending interventional cath, found to have  fat containing liver lesions on CT likely angiomyolipoma 73 year old female with HTN, hyperlipidemia, DM-II, CHF (EF 20%) w/ ICD in left chest, and CAD with multiple PTCA/stents (lastt February 2019) presents with NSTEMI s/p diagnostic cath on 6/19/19 and intra-aortic balloon pump pending interventional cath, found to have  fat containing liver lesions on CT likely angiomyolipoma    Relevant history: presented with abd pain CT showing 2 hepatic lesions in the lateral segment of left hepatic lobe containing macroscopic fat. No known history of cirrhosis or obesity, recent history of early satiety with 10lb weight loss, abdominal pain or night sweats. She is immune to hepatitis B due to past infection    - Based on abdominal CT showing fat-containing liver lesions without evidence of cirrhosis, low likelihood of lesions being a malignancy, more likely angiomyolipoma  - Would not hold up cardiac intervention/management as no urgent GI intervention currently indicated  - Called [facility] for records; no abdominal imaging CT or ultrasound was done during February hospitalization  - If patient's creatinine improves over the weekend, consider triple phase CT anyway, for better elucidation of lesions

## 2019-06-21 NOTE — PROGRESS NOTE ADULT - PROBLEM SELECTOR PLAN 3
Evaluation and work up regarding the liver mass found on CT scan  - ICD is NOT MRI compatible  - d/w hepatology and per discussion with radiology, liver lesion is likely to be benign; and given RUTH, MRI incompatability - it would make sense to proceed to revacularize and place stents.  - will not obtain triple phase CT w/ contrast for now

## 2019-06-21 NOTE — PROGRESS NOTE ADULT - SUBJECTIVE AND OBJECTIVE BOX
Patient is a 73y old  Female who presents with a chief complaint of NSTEMI, Post- cath transfer to CCU (20 Jun 2019 10:31)      SUBJECTIVE / OVERNIGHT EVENTS:    Overnight, no acute events. Denies sob, cp. Reports right foot pain/cramping/stiffness.     MEDICATIONS  (STANDING):  aspirin enteric coated 81 milliGRAM(s) Oral daily  atorvastatin 40 milliGRAM(s) Oral at bedtime  chlorhexidine 4% Liquid 1 Application(s) Topical <User Schedule>  docusate sodium 100 milliGRAM(s) Oral two times a day  furosemide   Injectable 40 milliGRAM(s) IV Push two times a day  heparin  Infusion 1000 Unit(s)/Hr (5 mL/Hr) IV Continuous <Continuous>  insulin glargine Injectable (LANTUS) 25 Unit(s) SubCutaneous at bedtime  insulin lispro (HumaLOG) corrective regimen sliding scale   SubCutaneous three times a day before meals  insulin lispro (HumaLOG) corrective regimen sliding scale   SubCutaneous at bedtime  isosorbide   dinitrate Tablet (ISORDIL) 10 milliGRAM(s) Oral three times a day  pantoprazole    Tablet 40 milliGRAM(s) Oral before breakfast  senna 2 Tablet(s) Oral at bedtime  sodium chloride 0.9% lock flush 3 milliLiter(s) IV Push every 8 hours  ticagrelor 90 milliGRAM(s) Oral every 12 hours    MEDICATIONS  (PRN):  dextrose 40% Gel 15 Gram(s) Oral once PRN Blood Glucose LESS THAN 70 milliGRAM(s)/deciliter  glucagon  Injectable 1 milliGRAM(s) IntraMuscular once PRN Glucose LESS THAN 70 milligrams/deciliter      Vital Signs Last 24 Hrs  T(C): 36.2 (21 Jun 2019 04:00), Max: 37.1 (20 Jun 2019 20:00)  T(F): 97.2 (21 Jun 2019 04:00), Max: 98.8 (20 Jun 2019 20:00)  HR: 77 (21 Jun 2019 07:00) (76 - 95)  BP: 114/62 (21 Jun 2019 05:00) (114/62 - 140/66)  BP(mean): 75 (21 Jun 2019 05:00) (69 - 91)  RR: 13 (21 Jun 2019 07:00) (13 - 27)  SpO2: 99% (21 Jun 2019 07:00) (93% - 100%)  CAPILLARY BLOOD GLUCOSE      POCT Blood Glucose.: 180 mg/dL (21 Jun 2019 07:57)  POCT Blood Glucose.: 227 mg/dL (20 Jun 2019 21:17)  POCT Blood Glucose.: 247 mg/dL (20 Jun 2019 16:45)  POCT Blood Glucose.: 238 mg/dL (20 Jun 2019 12:04)    I&O's Summary    20 Jun 2019 07:01  -  21 Jun 2019 07:00  --------------------------------------------------------  IN: 731 mL / OUT: 2300 mL / NET: -1569 mL        PHYSICAL EXAM:  GENERAL: NAD   CHEST/LUNG: CTABL ; No wheeze  HEART: RRR; No murmurs  ABDOMEN: Soft, Nontender, Nondistended; Bowel sounds present  :  Female condom catheter  EXTREMITIES:  No edema; Pulses intact  PSYCH: AAOx3  NEUROLOGY: no gross focal deficits    LABS:                        9.4    11.88 )-----------( 234      ( 21 Jun 2019 04:43 )             28.7     06-21    137  |  97<L>  |  56<H>  ----------------------------<  213<H>  4.3   |  25  |  1.99<H>    Ca    8.8      21 Jun 2019 04:43  Phos  4.8     06-21  Mg     2.2     06-21    TPro  7.5  /  Alb  3.7  /  TBili  0.4  /  DBili  x   /  AST  30  /  ALT  21  /  AlkPhos  121<H>  06-21    PT/INR - ( 19 Jun 2019 21:17 )   PT: 12.3 SEC;   INR: 1.10          PTT - ( 21 Jun 2019 04:43 )  PTT:122.0 SEC          Microbiology;        RADIOLOGY & ADDITIONAL TESTS:

## 2019-06-21 NOTE — PROGRESS NOTE ADULT - PROBLEM SELECTOR PLAN 1
Relevant history: presented with abd pain CT showing 2 hepatic lesions in the lateral segment of left hepatic lobe containing macroscopic fat. No known history of cirrhosis or obesity, recent history of early satiety with 10lb weight loss, abdominal pain or night sweats. As per labs, + reactive HepB core ab total with possibly history of prior HepB infection    - Based on abdominal CT showing fat-containing liver lesions without evidence of cirrhosis, low likelihood of lesions being a malignancy, more likely angiomyolipoma  - Would not hold up cardiac intervention/management as no urgent GI intervention currently indicated  - Called [facility] for records; no abdominal imaging CT or ultrasound was done during February hospitalization  - If patient's creatinine improves over the weekend, consider triple phase CT anyway, for better elucidation of lesions

## 2019-06-21 NOTE — PROGRESS NOTE ADULT - SUBJECTIVE AND OBJECTIVE BOX
[facility]   MD YENI MARTÍNEZ MD ANGELA WONG, PA    TEL:  OFFICE: [phone]  DR LEUNG CELL: [phone]  DR. LUNA CELL: [phone]  JAJA VELASQUEZ CELL: [phone]        Patient is a 73y old  Female who presents with a chief complaint of NSTEMI, Post- cath transfer to CCU (20 Jun 2019 10:31)      Patient seen and examined at bedside. feeling better today, has slight chest pressure     VITALS:  T(F): 97.2 (06-21-19 @ 12:00), Max: 98.8 (06-20-19 @ 20:00)  HR: 93 (06-21-19 @ 13:00)  BP: 114/62 (06-21-19 @ 05:00)  RR: 16 (06-21-19 @ 13:00)  SpO2: 99% (06-21-19 @ 13:00)  Wt(kg): --    06-20 @ 07:01  -  06-21 @ 07:00  --------------------------------------------------------  IN: 731 mL / OUT: 2300 mL / NET: -1569 mL    06-21 @ 07:01  -  06-21 @ 13:29  --------------------------------------------------------  IN: 640 mL / OUT: 600 mL / NET: 40 mL          PHYSICAL EXAM:  Constitutional: NAD  Neck: No JVD  Respiratory: CTAB, no wheezes, rales or rhonchi  Cardiovascular: S1, S2, RRR  Gastrointestinal: BS+, soft, NT/ND  Extremities: No peripheral edema    Hospital Medications:   MEDICATIONS  (STANDING):  aspirin enteric coated 81 milliGRAM(s) Oral daily  atorvastatin 40 milliGRAM(s) Oral at bedtime  chlorhexidine 4% Liquid 1 Application(s) Topical <User Schedule>  dextrose 5%. 1000 milliLiter(s) (50 mL/Hr) IV Continuous <Continuous>  dextrose 50% Injectable 12.5 Gram(s) IV Push once  dextrose 50% Injectable 25 Gram(s) IV Push once  docusate sodium 100 milliGRAM(s) Oral two times a day  heparin  Infusion 1000 Unit(s)/Hr (5 mL/Hr) IV Continuous <Continuous>  insulin glargine Injectable (LANTUS) 25 Unit(s) SubCutaneous at bedtime  insulin lispro (HumaLOG) corrective regimen sliding scale   SubCutaneous three times a day before meals  insulin lispro (HumaLOG) corrective regimen sliding scale   SubCutaneous at bedtime  isosorbide   dinitrate Tablet (ISORDIL) 10 milliGRAM(s) Oral three times a day  pantoprazole    Tablet 40 milliGRAM(s) Oral before breakfast  senna 2 Tablet(s) Oral at bedtime  sodium chloride 0.9% lock flush 3 milliLiter(s) IV Push every 8 hours  ticagrelor 90 milliGRAM(s) Oral every 12 hours      LABS:  06-21    137  |  97<L>  |  56<H>  ----------------------------<  213<H>  4.3   |  25  |  1.99<H>    Ca    8.8      21 Jun 2019 04:43  Phos  4.8     06-21  Mg     2.2     06-21    TPro  7.5  /  Alb  3.7  /  TBili  0.4  /  DBili      /  AST  30  /  ALT  21  /  AlkPhos  121<H>  06-21    Creatinine Trend: 1.99 <--, 1.84 <--, 1.78 <--, 2.19 <--, 2.04 <--, 1.91 <--, 2.12 <--, 1.69 <--    Phosphorus Level, Serum: 4.8 mg/dL (06-21 @ 04:43)  Albumin, Serum: 3.7 g/dL (06-21 @ 04:43)                              9.4    11.88 )-----------( 234      ( 21 Jun 2019 04:43 )             28.7     Urine Studies:      HbA1c 15.1      [06-15-19 @ 12:17]  TSH 1.66      [06-15-19 @ 07:30]  Lipid: chol 118, , HDL 29, LDL 56      [06-15-19 @ 07:30]    HBsAb Reactive      [06-19-19 @ 19:32]  HBsAg NEGATIVE      [06-20-19 @ 12:00]  HBcAb Reactive      [06-19-19 @ 19:32]  HCV 0.09, Nonreact      [06-15-19 @ 12:23]      RADIOLOGY & ADDITIONAL STUDIES:

## 2019-06-21 NOTE — PROGRESS NOTE ADULT - ASSESSMENT
73 year old female with HTN, hyperlipidemia, DM-II, CHF (EF 20%) w/ ICD in left chest, and CAD with multiple PTCA/stents, most recent Feb 2019 who presented to  ED 6/16/19 complaining of abdominal pain x 1 week, found to have 2 hepatic lesion on CT non-contrast, c/b NSTEMI transferred to [facility] for catheterization with 99% Left main and 100% LAD, with IABP placed on nitro gtt &hep gtt. Briefly on lasix gtt, transitioned to IVP. Awaiting for liver lesion workup prior to potential staged PCI

## 2019-06-21 NOTE — PROGRESS NOTE ADULT - PROBLEM SELECTOR PLAN 7
will continue to monitor BUN and creatinine  - avoid nephrotoxic agents, patient did receive contrast with cardiac cath  - will trend Cr prior to getting possible CT triple phase w/ contrast, if necessary will continue to monitor BUN and creatinine  - avoid nephrotoxic agents, patient did receive contrast with cardiac cath  - will give 250 cc IVF today to pursue CT triple phase w/ contrast will continue to monitor BUN and creatinine  - avoid nephrotoxic agents, patient did receive contrast with cardiac cath  - likely overdiuresed given wedge of 3 and Cr bump. Will give 250 cc bolus today

## 2019-06-21 NOTE — PROGRESS NOTE ADULT - PROBLEM SELECTOR PLAN 7
will continue to monitor BUN and creatinine  - avoid nephrotoxic agents, patient did receive contrast with cardiac cath  - likely overdiuresed given wedge of 3 and Cr bump. Will give 250 cc bolus today

## 2019-06-21 NOTE — CONSULT NOTE ADULT - ASSESSMENT
74 y/o HTN, HLD, DM II, HFrEF (20%), MI 8 years ago, CAD w/ stents (last in feb 2019), ICD, CKD. Patient has had numerous hospitlizations, 4 this year alone for CP and SOB (at [facility], [facility]). She initially presented to [facility] for CP, abodminal pain and SOB. She was found to have NSTEMI and was sent to [facility] for possible PCI. She had a LHC 6/19 which showed LM 95% stenosis, prox %, prox circ 20% at prior stent, OM I 30% at prior stent, mid RCA 30% prior stent.  RHC RA 13, PA 66/29/43, PCWP 28, PA sat 39%, CO 2.85, CI 1.78, PVR 5.27, SVR 1770. Patient was placed on IABP and sent to CCU. 74 y/o HTN, HLD, DM II, HFrEF (20%), MI 8 years ago, CAD w/ stents (last in feb 2019), ICD, CKD. Patient has had numerous hospitlizations, 4 this year alone for CP and SOB (at [facility], [facility], [facility]). She initially presented to [facility] for CP, abodminal pain and SOB. She was found to have NSTEMI and was sent to [facility] for possible PCI. She had a LHC 6/19 which showed LM 95% stenosis, prox %, prox circ 20% at prior stent, OM I 30% at prior stent, mid RCA 30% prior stent.  RHC RA 13, PA 66/29/43, PCWP 28, PA sat 39%, CO 2.85, CI 1.78, PVR 5.27, SVR 1770. Pt admits to [facility] after a few steps for many months, also had PND, orthopnea. Currently she is feeling a lot better, no SOB at rest.

## 2019-06-21 NOTE — PROGRESS NOTE ADULT - PROBLEM SELECTOR PLAN 1
Admitted to CCU post cardiac cath  Balloon pump 1:1  Nitro gtt titrated off  continue ASA and Brilinta and lipitor 40  control BP with nitro gtt for now, will transition to PO antihypertensives  -currently chest pain free

## 2019-06-21 NOTE — PROGRESS NOTE ADULT - PROBLEM SELECTOR PLAN 3
Evaluation and work up regarding the liver mass found on CT scan  - ICD is NOT MRI compatible  -cardiac revascularization is pending a possible liver biospy/intervention, patient will be unable to start dual antiplatelet therapy prior to liver biopsy/intervention  - given recent stents in Feb, will have to discuss whether patient can be off dual anti-platelet therapy for 5d prior to possible intervention  -cardiology would also like pathology of the liver lesion prior to revascularization.  - will obtain triple phase CT w/ contrat when Cr stable Evaluation and work up regarding the liver mass found on CT scan  - ICD is NOT MRI compatible  -cardiac revascularization is pending a possible liver biospy/intervention, patient will be unable to start dual antiplatelet therapy prior to liver biopsy/intervention  - given recent stents in Feb, will have to discuss whether patient can be off dual anti-platelet therapy for 5d prior to possible intervention  -cardiology would also like pathology of the liver lesion prior to revascularization.  - will obtain triple phase CT w/ contrast Evaluation and work up regarding the liver mass found on CT scan  - ICD is NOT MRI compatible  - d/w hepatology and per discussion with radiology, liver lesion is likely to be benign; and given RUTH, MRI incompatability - it would make sense to proceed to revacularize and place stents.  - will not obtain triple phase CT w/ contrast for now

## 2019-06-21 NOTE — CONSULT NOTE ADULT - SUBJECTIVE AND OBJECTIVE BOX
Date of Admission:    CHIEF COMPLAINT:    HISTORY OF PRESENT ILLNESS:      Allergies    No Known Allergies    Intolerances    	    MEDICATIONS:  aspirin enteric coated 81 milliGRAM(s) Oral daily  heparin  Infusion 1000 Unit(s)/Hr IV Continuous <Continuous>  isosorbide   dinitrate Tablet (ISORDIL) 10 milliGRAM(s) Oral three times a day  ticagrelor 90 milliGRAM(s) Oral every 12 hours          docusate sodium 100 milliGRAM(s) Oral two times a day  pantoprazole    Tablet 40 milliGRAM(s) Oral before breakfast  senna 2 Tablet(s) Oral at bedtime    atorvastatin 40 milliGRAM(s) Oral at bedtime  dextrose 40% Gel 15 Gram(s) Oral once PRN  dextrose 50% Injectable 12.5 Gram(s) IV Push once  dextrose 50% Injectable 25 Gram(s) IV Push once  glucagon  Injectable 1 milliGRAM(s) IntraMuscular once PRN  insulin glargine Injectable (LANTUS) 25 Unit(s) SubCutaneous at bedtime  insulin lispro (HumaLOG) corrective regimen sliding scale   SubCutaneous three times a day before meals  insulin lispro (HumaLOG) corrective regimen sliding scale   SubCutaneous at bedtime    chlorhexidine 4% Liquid 1 Application(s) Topical <User Schedule>  dextrose 5%. 1000 milliLiter(s) IV Continuous <Continuous>  sodium chloride 0.9% lock flush 3 milliLiter(s) IV Push every 8 hours      PAST MEDICAL & SURGICAL HISTORY:  Liver lesion  Stented coronary artery:  at [facility]  2019 [facility]  NSTEMI (non-ST elevated myocardial infarction)  Diabetes mellitus type 2 in nonobese  Constipation  Anemia  RUTH (acute kidney injury)  CAD (coronary artery disease)  Chronic CHF: Systolic EF 20%  Hyperlipidemia, unspecified hyperlipidemia type  Hypertension, unspecified type  Implantable cardioverter-defibrillator (ICD) in situ      FAMILY HISTORY:  Family history of heart attack (Father):  74yo      SOCIAL HISTORY:    [ ] Non-smoker  [ ] Smoker  [ ] Alcohol      REVIEW OF SYSTEMS:  CONSTITUTIONAL: No fever, weight loss, or fatigue  EYES: No eye pain, visual disturbances, or discharge  ENMT:  No difficulty hearing, tinnitus, vertigo; No sinus or throat pain  NECK: No pain or stiffness  RESPIRATORY: No cough, wheezing, chills or hemoptysis; No Shortness of Breath  CARDIOVASCULAR: No chest pain, palpitations, passing out, dizziness, or leg swelling  GASTROINTESTINAL: No abdominal or epigastric pain. No nausea, vomiting, or hematemesis; No diarrhea or constipation. No melena or hematochezia.  GENITOURINARY: No dysuria, frequency, hematuria, or incontinence  NEUROLOGICAL: No headaches, memory loss, loss of strength, numbness, or tremors  SKIN: No itching, burning, rashes, or lesions   LYMPH Nodes: No enlarged glands  ENDOCRINE: No heat or cold intolerance; No hair loss  MUSCULOSKELETAL: No joint pain or swelling; No muscle, back, or extremity pain  PSYCHIATRIC: No depression, anxiety, mood swings, or difficulty sleeping  HEME/LYMPH: No easy bruising, or bleeding gums  ALLERY AND IMMUNOLOGIC: No hives or eczema	    [ ] All others negative	  [ ] Unable to obtain    PHYSICAL EXAM:  T(C): 36.2 (19 @ 12:00), Max: 37.1 (19 @ 20:00)  HR: 93 (19 @ 13:00) (76 - 95)  BP: 114/62 (19 @ 05:00) (114/62 - 140/66)  RR: 16 (19 @ 13:00) (13 - 27)  SpO2: 99% (19 @ 13:00) (93% - 100%)  Wt(kg): --  I&O's Summary    2019 07:  -  2019 07:00  --------------------------------------------------------  IN: 731 mL / OUT: 2300 mL / NET: -1569 mL    2019 07:  -  2019 14:14  --------------------------------------------------------  IN: 640 mL / OUT: 600 mL / NET: 40 mL        Appearance: Normal	  HEENT:   Normal oral mucosa, PERRL, EOMI	  Lymphatic: No lymphadenopathy  Cardiovascular: Normal S1 S2, No JVD, No murmurs, No edema  Respiratory: Lungs clear to auscultation	  Psychiatry: A & O x 3, Mood & affect appropriate  Gastrointestinal:  Soft, Non-tender, + BS	  Skin: No rashes, No ecchymoses, No cyanosis	  Neurologic: Non-focal  Extremities: Normal range of motion, No clubbing, cyanosis or edema  Vascular: Peripheral pulses palpable 2+ bilaterally        LABS:	 	    CBC Full  -  ( 2019 04:43 )  WBC Count : 11.88 K/uL  Hemoglobin : 9.4 g/dL  Hematocrit : 28.7 %  Platelet Count - Automated : 234 K/uL  Mean Cell Volume : 81.5 fL  Mean Cell Hemoglobin : 26.7 pg  Mean Cell Hemoglobin Concentration : 32.8 %  Auto Neutrophil # : x  Auto Lymphocyte # : x  Auto Monocyte # : x  Auto Eosinophil # : x  Auto Basophil # : x  Auto Neutrophil % : x  Auto Lymphocyte % : x  Auto Monocyte % : x  Auto Eosinophil % : x  Auto Basophil % : x        137  |  97<L>  |  56<H>  ----------------------------<  213<H>  4.3   |  25  |  1.99<H>      137  |  99  |  49<H>  ----------------------------<  138<H>  4.3   |  24  |  1.84<H>    Ca    8.8      2019 04:43  Ca    8.6      2019 03:45  Phos  4.8       Phos  4.4     -20  Mg     2.2       Mg     2.5     -20    TPro  7.5  /  Alb  3.7  /  TBili  0.4  /  DBili  x   /  AST  30  /  ALT  21  /  AlkPhos  121<H>    TPro  6.6  /  Alb  2.9<L>  /  TBili  0.4  /  DBili  x   /  AST  18  /  ALT  15  /  AlkPhos  102      < from: Cardiac Cath Lab - Adult (19 @ 16:49) >  LM:   --  Distal left main: There was a tubular 95 % stenosis at a site  with no prior intervention. The lesion was hazy and eccentric. There was  SARA grade 2 flow through the vessel (partial perfusion).  LAD:   --  Proximal LAD: There was a 100 % stenosis. There was a  moderate-sized vascular territory distal to the lesion and poor collateral  blood supply to the distal myocardium. This lesion is a chronic total  occlusion. Distal vessel angiography showed complete occlusion.  CX:   --  Proximal circumflex: There was a tubular 20 % stenosis at the  site of a prior stent. The lesion was smoothly contoured. There was SARA  grade 3 flow through the vessel (brisk flow).  --  OM1: There was a tubular 30 % stenosis at the site of a prior stent.  The lesion was smoothly contoured. There was SARA grade 3 flow through the  vessel (brisk flow).  RCA:   --  Proximal RCA: There was a diffuse 20 % stenosis at the site of a  prior stent. The lesion was smoothly contoured. There was SARA grade 3  flow through the vessel (brisk flow).  --  Mid RCA: There was a diffuse 30 % stenosis at the site of a prior  stent. The lesion was smoothly contoured. There was SARA grade 3 flow  through the vessel (brisk flow).  --  RPDA: Angiography showed mild atherosclerosis with no flow limiting  lesions.  --  RPLS: Angiography showed mild atherosclerosis with no flow limiting  lesions.  COMPLICATIONS: There were no complications.  DIAGNOSTIC IMPRESSIONS: Severe stenosis distal left main.  Proximal LAD is a , distal vessel is not visualized and is occluded.  Patent stents RCA  Elevated LVEDP, low cardiac output/index  IABP placed for hemodynamic support  DIAGNOSTIC RECOMMENDATIONS: Optimize heart failure therapy  GI evaluation and ? FNA of liver mass that may be suspicious for malignancy  PCI to LM into LCx once above issues resolved, creatinine improved and  anemia is stable.  INTERVENTIONAL IMPRESSIONS: Severe stenosis distal left main.  Proximal LAD is a , distal vessel is not visualized and is occluded.  Patent stents RCA  Elevated LVEDP, low cardiac output/index  IABP placed for hemodynamic support  INTERVENTIONAL RECOMMENDATIONS: Optimize heart failure therapy  GI evaluation and ? FNA of liver mass that may be suspicious for malignancy  PCI to LM into LCx once above issues resolved, creatinine improved and  anemia is stable.  Prepared and signed by  Bailey Lima M.D.  Signed 2019 14:28:33  HEMODYNAMIC TABLES  Pressures:  Baseline  Pressures:  - HR: 87  Pressures:  -Rhythm:  Pressures:  -- Aortic Pressure (S/D/M): 113/54/76  Pressures:  -- Left Ventricle (s/edp): 116/30/--  Pressures:  -- Pulmonary Artery (S/D/M): 66/29/43  Pressures:  -- Pulmonary Capillary Wedge: 29/33/28  Pressures:  -- Right Atrium (a/v/M): 16/17/13  Pressures:  -- Right Ventricle (s/edp): 67/16/--  O2 Sats:  Baseline  O2 Sats:  - HR: 87  O2 Sats:  - Rhythm:  O2 Sats:  -- AO: 8.6/100/11.7  O2 Sats:  -- PA: 8.6/39.8/4.66  Outputs:  Baseline  Outputs:  -- CALCULATIONS[age]  Outputs:  -- CALCULATIONS: Body Surface Area: 1.60  Outputs:  -- CALCULATIONS: Height in cm: 157.00  Outputs:  -- CALCULATIONS: Sex: Female  Outputs:  -- CALCULATIONS: Weight in k.30  Outputs:  -- OUTPUTS: CO by Koffi: 2.85  Outputs:  -- OUTPUTS:Koffi cardiac index: 1.78  Outputs:  -- OUTPUTS: O2 consumption: 200.42  Outputs:  -- OUTPUTS: Vo2 Indexed: 125.00  Outputs:  -- RESISTANCES: Left ventricular stroke work: 17.21  Outputs:  -- RESISTANCES: Left Ventricular Stroke Work index: 10.73  Outputs:  -- RESISTANCES: Pulmonary vascular index (dsc): 675.77  Outputs:  -- RESISTANCES: Pulmonary vascular index (Wood Units): 8.45  Outputs:  -- RESISTANCES: Pulmonary vascular resistance (dsc): 421.47  Outputs:  -- RESISTANCES: Pulmonary vascular resistance (Wood Units): 5.27  Outputs:  -- RESISTANCES: PVR_SVR Ratio: 0.24  Outputs:  -- RESISTANCES: Right ventricular stroke work: 12.76  Outputs:  -- RESISTANCES: Right ventricular stroke work index: 7.96  Outputs:  -- RESISTANCES: Systemic vascular index (dsc): 2838.22  Outputs:  -- RESISTANCES: Systemic vascular index (Wood Units): 35.49  Outputs:  -- RESISTANCES: Systemic vascular resistance (dsc): 1770.18  Outputs:  -- RESISTANCES: Systemic vascular resistance (Wood Units): 22.13  Outputs: -- RESISTANCES: Total pulmonary index (dsc): 1937.20  Outputs:  -- RESISTANCES: Total pulmonary index (Wood Units): 24.22  Outputs:  -- RESISTANCES: Total pulmonary resistance (dsc): 1208.22  Outputs:  -- RESISTANCES: Total pulmonary resistance (Wood Units): 15.11  Outputs:  -- RESISTANCES: Total vascular index (Wood Units): 42.81  Outputs:  -- RESISTANCES: Total vascular resistance (dsc): 2135.46  Outputs:  -- RESISTANCES: Total vascular resistance (Wood Units): 26.70  Outputs:  -- RESISTANCES:Total vascular resistance index (dsc): 3423.88  Outputs:  -- RESISTANCES: TPR_TVR Ratio: 0.57  Outputs:  -- SHUNTS: Pulmonary flow: 2.85  Outputs:  -- SHUNTS: Qp Indexed: 1.78  Outputs:  -- SHUNTS: Qs Indexed: 1.78  Outputs:  -- SHUNTS: Systemic flow: 2.85    < end of copied text >    < from: Transthoracic Echocardiogram (19 @ 09:00) >  DIMENSIONS:  Dimensions:     Normal Values:  LA:     3.7 cm    2.0 - 4.0 cm  Ao:     2.9 cm    2.0 - 3.8 cm  SEPTUM: 0.7 cm    0.6 - 1.2 cm  PWT:    0.7 cm    0.6 - 1.1 cm  LVIDd:  5.8 cm    3.0 - 5.6 cm  LVIDs:  5.2 cm    1.8 - 4.0 cm      Derived Variables:  LVMI: 92 g/m2  RWT: 0.24  Ejection Fraction Visual Estimate: 20-25 %    ------------------------------------------------------------------------  OBSERVATIONS:  Mitral Valve: Mitral annular calcification, and calcified  mitral leaflets with normal diastolic opening. Tethered  mitral valve leaflets. Moderate mitral regurgitation.  Aortic Root: Normal aortic root.  Aortic Valve: Calcified aortic valve with normal opening.  Trace aortic regurgitation.  Left Atrium: Normal left atrium.  LA volume index = 16  cc/m2.  Left Ventricle: Severe segmental left ventricular systolic  dysfunction. The entire anterior wall, anteroseptum and  apex are akinetic. Mild left ventricular enlargement.  Right Heart: Normal right atrium. Normal right ventricular  size and function. A device lead is visualized in the right  heart. There is moderate tricuspid regurgitation. There is  trace pulmonicregurgitation.  Pericardium/PleuraNormal pericardium with no pericardial  effusion.  Hemodynamic: RA Pressure is 10 mm Hg. RV systolic pressure  is 68 mm Hg. Severe pulmonary hypertension.    < end of copied text >    < from: Xray Chest 1 View- PORTABLE-Routine (19 @ 10:51) >  IMPRESSION:    1.  Intra-aortic balloon pump 1.7 cm below the superior aspect of the   aortic knob.  2.  [place] catheter with tip in the right main pulmonary artery.    < end of copied text > Date of Admission: 19    CHIEF COMPLAINT:    HISTORY OF PRESENT ILLNESS:    72 y/o HTN, HLD, DM II, HFrEF (20%), MI 8 years ago, CAD w/ stents (last in 2019), ICD, CKD. Patient has had numerous hospitlizations, 4 this year alone for CP and SOB (at [facility], [facility]). She initially presented to [facility] for CP, abodminal pain and SOB. She was found to have NSTEMI and was sent to [facility] for possible PCI. She had a LHC  which showed LM 95% stenosis, prox %, prox circ 20% at prior stent, OM I 30% at prior stent, mid RCA 30% prior stent.  RHC RA 13, PA 66/29/43, PCWP 28, PA sat 39%, CO 2.85, CI 1.78, PVR 5.27, SVR 1770.      Allergies    No Known Allergies    Intolerances      MEDICATIONS:  aspirin enteric coated 81 milliGRAM(s) Oral daily  heparin  Infusion 1000 Unit(s)/Hr IV Continuous <Continuous>  isosorbide   dinitrate Tablet (ISORDIL) 10 milliGRAM(s) Oral three times a day  ticagrelor 90 milliGRAM(s) Oral every 12 hours          docusate sodium 100 milliGRAM(s) Oral two times a day  pantoprazole    Tablet 40 milliGRAM(s) Oral before breakfast  senna 2 Tablet(s) Oral at bedtime    atorvastatin 40 milliGRAM(s) Oral at bedtime  dextrose 40% Gel 15 Gram(s) Oral once PRN  dextrose 50% Injectable 12.5 Gram(s) IV Push once  dextrose 50% Injectable 25 Gram(s) IV Push once  glucagon  Injectable 1 milliGRAM(s) IntraMuscular once PRN  insulin glargine Injectable (LANTUS) 25 Unit(s) SubCutaneous at bedtime  insulin lispro (HumaLOG) corrective regimen sliding scale   SubCutaneous three times a day before meals  insulin lispro (HumaLOG) corrective regimen sliding scale   SubCutaneous at bedtime    chlorhexidine 4% Liquid 1 Application(s) Topical <User Schedule>  dextrose 5%. 1000 milliLiter(s) IV Continuous <Continuous>  sodium chloride 0.9% lock flush 3 milliLiter(s) IV Push every 8 hours      PAST MEDICAL & SURGICAL HISTORY:  Liver lesion  Stented coronary artery:  at [facility]  2019 [facility]  NSTEMI (non-ST elevated myocardial infarction)  Diabetes mellitus type 2 in nonobese  Constipation  Anemia  RUTH (acute kidney injury)  CAD (coronary artery disease)  Chronic CHF: Systolic EF 20%  Hyperlipidemia, unspecified hyperlipidemia type  Hypertension, unspecified type  Implantable cardioverter-defibrillator (ICD) in situ      FAMILY HISTORY:  Family history of heart attack (Father):  74yo      SOCIAL HISTORY:    Non smoker, no ETOH or drug abuse     REVIEW OF SYSTEMS:  CONSTITUTIONAL: No fever, weight loss, or fatigue  EYES: No eye pain, visual disturbances, or discharge  ENMT:  No difficulty hearing, tinnitus, vertigo; No sinus or throat pain  NECK: No pain or stiffness  RESPIRATORY: No cough, wheezing, chills or hemoptysis; admits to Shortness of Breath  CARDIOVASCULAR: admits to prior chest pain. no palpitations, passing out, dizziness, or leg swelling  GASTROINTESTINAL: No abdominal or epigastric pain. No nausea, vomiting, or hematemesis; No diarrhea or constipation. No melena or hematochezia.  GENITOURINARY: No dysuria, frequency, hematuria, or incontinence  NEUROLOGICAL: No headaches, memory loss, loss of strength, numbness, or tremors  SKIN: No itching, burning, rashes, or lesions   LYMPH Nodes: No enlarged glands  ENDOCRINE: No heat or cold intolerance; No hair loss  MUSCULOSKELETAL: No joint pain or swelling; No muscle, back, or extremity pain  PSYCHIATRIC: No depression, anxiety, mood swings, or difficulty sleeping  HEME/LYMPH: No easy bruising, or bleeding gums  ALLERY AND IMMUNOLOGIC: No hives or eczema	    [ ] All others negative	  [ ] Unable to obtain    PHYSICAL EXAM:  T(C): 36.2 (19 @ 12:00), Max: 37.1 (19 @ 20:00)  HR: 93 (19 @ 13:00) (76 - 95)  BP: 114/62 (19 @ 05:00) (114/62 - 140/66)  RR: 16 (19 @ 13:00) (13 - 27)  SpO2: 99% (19 @ 13:00) (93% - 100%)  Wt(kg): --  I&O's Summary    2019 07:01  -  2019 07:00  --------------------------------------------------------  IN: 731 mL / OUT: 2300 mL / NET: -1569 mL    2019 07:01  -  2019 14:14  --------------------------------------------------------  IN: 640 mL / OUT: 600 mL / NET: 40 mL        Appearance: Normal	  HEENT:   Normal oral mucosa, PERRL, EOMI	  Lymphatic: No lymphadenopathy  Cardiovascular: Normal S1 S2, mildly elevated JVD, No murmurs, No edema  Respiratory: Lungs clear to auscultation	  Psychiatry: A & O x 3, Mood & affect appropriate  Gastrointestinal:  Soft, Non-tender, + BS	  Skin: No rashes, No ecchymoses, No cyanosis	  Neurologic: Non-focal  Extremities: Normal range of motion, No clubbing, cyanosis or edema  Vascular: Peripheral pulses palpable 2+ bilaterally        LABS:	 	    CBC Full  -  ( 2019 04:43 )  WBC Count : 11.88 K/uL  Hemoglobin : 9.4 g/dL  Hematocrit : 28.7 %  Platelet Count - Automated : 234 K/uL  Mean Cell Volume : 81.5 fL  Mean Cell Hemoglobin : 26.7 pg  Mean Cell Hemoglobin Concentration : 32.8 %  Auto Neutrophil # : x  Auto Lymphocyte # : x  Auto Monocyte # : x  Auto Eosinophil # : x  Auto Basophil # : x  Auto Neutrophil % : x  Auto Lymphocyte % : x  Auto Monocyte % : x  Auto Eosinophil % : x  Auto Basophil % : x        137  |  97<L>  |  56<H>  ----------------------------<  213<H>  4.3   |  25  |  1.99<H>  06-20    137  |  99  |  49<H>  ----------------------------<  138<H>  4.3   |  24  |  1.84<H>    Ca    8.8      2019 04:43  Ca    8.6      2019 03:45  Phos  4.8       Phos  4.4       Mg     2.2       Mg     2.5         TPro  7.5  /  Alb  3.7  /  TBili  0.4  /  DBili  x   /  AST  30  /  ALT  21  /  AlkPhos  121<H>    TPro  6.6  /  Alb  2.9<L>  /  TBili  0.4  /  DBili  x   /  AST  18  /  ALT  15  /  AlkPhos  102      < from: Cardiac Cath Lab - Adult (19 @ 16:49) >  LM:   --  Distal left main: There was a tubular 95 % stenosis at a site  with no prior intervention. The lesion was hazy and eccentric. There was  SARA grade 2 flow through the vessel (partial perfusion).  LAD:   --  Proximal LAD: There was a 100 % stenosis. There was a  moderate-sized vascular territory distal to the lesion and poor collateral  blood supply to the distal myocardium. This lesion is a chronic total  occlusion. Distal vessel angiography showed complete occlusion.  CX:   --  Proximal circumflex: There was a tubular 20 % stenosis at the  site of a prior stent. The lesion was smoothly contoured. There was SARA  grade 3 flow through the vessel (brisk flow).  --  OM1: There was a tubular 30 % stenosis at the site of a prior stent.  The lesion was smoothly contoured. There was SARA grade 3 flow through the  vessel (brisk flow).  RCA:   --  Proximal RCA: There was a diffuse 20 % stenosis at the site of a  prior stent. The lesion was smoothly contoured. There was SARA grade 3  flow through the vessel (brisk flow).  --  Mid RCA: There was a diffuse 30 % stenosis at the site of a prior  stent. The lesion was smoothly contoured. There was SARA grade 3 flow  through the vessel (brisk flow).  --  RPDA: Angiography showed mild atherosclerosis with no flow limiting  lesions.  --  RPLS: Angiography showed mild atherosclerosis with no flow limiting  lesions.  COMPLICATIONS: There were no complications.  DIAGNOSTIC IMPRESSIONS: Severe stenosis distal left main.  Proximal LAD is a , distal vessel is not visualized and is occluded.  Patent stents RCA  Elevated LVEDP, low cardiac output/index  IABP placed for hemodynamic support  DIAGNOSTIC RECOMMENDATIONS: Optimize heart failure therapy  GI evaluation and ? FNA of liver mass that may be suspicious for malignancy  PCI to LM into LCx once above issues resolved, creatinine improved and  anemia is stable.  INTERVENTIONAL IMPRESSIONS: Severe stenosis distal left main.  Proximal LAD is a , distal vessel is not visualized and is occluded.  Patent stents RCA  Elevated LVEDP, low cardiac output/index  IABP placed for hemodynamic support  INTERVENTIONAL RECOMMENDATIONS: Optimize heart failure therapy  GI evaluation and ? FNA of liver mass that may be suspicious for malignancy  PCI to LM into LCx once above issues resolved, creatinine improved and  anemia is stable.  Prepared and signed by  Bailey Lima M.D.  Signed 2019 14:28:33  HEMODYNAMIC TABLES  Pressures:  Baseline  Pressures:  - HR: 87  Pressures:  -Rhythm:  Pressures:  -- Aortic Pressure (S/D/M): 113/54/76  Pressures:  -- Left Ventricle (s/edp): 116/30/--  Pressures:  -- Pulmonary Artery (S/D/M): 66/29/43  Pressures:  -- Pulmonary Capillary Wedge: 29/33/28  Pressures:  -- Right Atrium (a/v/M): 16/17/13  Pressures:  -- Right Ventricle (s/edp): 67/16/--  O2 Sats:  Baseline  O2 Sats:  - HR: 87  O2 Sats:  - Rhythm:  O2 Sats:  -- AO: 8.6/100/11.7  O2 Sats:  -- PA: 8.6/39.8/4.66  Outputs:  Baseline  Outputs:  -- CALCULATIONS[age]  Outputs:  -- CALCULATIONS: Body Surface Area: 1.60  Outputs:  -- CALCULATIONS: Height in cm: 157.00  Outputs:  -- CALCULATIONS: Sex: Female  Outputs:  -- CALCULATIONS: Weight in k.30  Outputs:  -- OUTPUTS: CO by Koffi: 2.85  Outputs:  -- OUTPUTS:Koffi cardiac index: 1.78  Outputs:  -- OUTPUTS: O2 consumption: 200.42  Outputs:  -- OUTPUTS: Vo2 Indexed: 125.00  Outputs:  -- RESISTANCES: Left ventricular stroke work: 17.21  Outputs:  -- RESISTANCES: Left Ventricular Stroke Work index: 10.73  Outputs:  -- RESISTANCES: Pulmonary vascular index (dsc): 675.77  Outputs:  -- RESISTANCES: Pulmonary vascular index (Wood Units): 8.45  Outputs:  -- RESISTANCES: Pulmonary vascular resistance (dsc): 421.47  Outputs:  -- RESISTANCES: Pulmonary vascular resistance (Wood Units): 5.27  Outputs:  -- RESISTANCES: PVR_SVR Ratio: 0.24  Outputs:  -- RESISTANCES: Right ventricular stroke work: 12.76  Outputs:  -- RESISTANCES: Right ventricular stroke work index: 7.96  Outputs:  -- RESISTANCES: Systemic vascular index (dsc): 2838.22  Outputs:  -- RESISTANCES: Systemic vascular index (Wood Units): 35.49  Outputs:  -- RESISTANCES: Systemic vascular resistance (dsc): 1770.18  Outputs:  -- RESISTANCES: Systemic vascular resistance (Wood Units): 22.13  Outputs: -- RESISTANCES: Total pulmonary index (dsc): 1937.20  Outputs:  -- RESISTANCES: Total pulmonary index (Wood Units): 24.22  Outputs:  -- RESISTANCES: Total pulmonary resistance (dsc): 1208.22  Outputs:  -- RESISTANCES: Total pulmonary resistance (Wood Units): 15.11  Outputs:  -- RESISTANCES: Total vascular index (Wood Units): 42.81  Outputs:  -- RESISTANCES: Total vascular resistance (dsc): 2135.46  Outputs:  -- RESISTANCES: Total vascular resistance (Wood Units): 26.70  Outputs:  -- RESISTANCES:Total vascular resistance index (dsc): 3423.88  Outputs:  -- RESISTANCES: TPR_TVR Ratio: 0.57  Outputs:  -- SHUNTS: Pulmonary flow: 2.85  Outputs:  -- SHUNTS: Qp Indexed: 1.78  Outputs:  -- SHUNTS: Qs Indexed: 1.78  Outputs:  -- SHUNTS: Systemic flow: 2.85    < end of copied text >    < from: Transthoracic Echocardiogram (19 @ 09:00) >  DIMENSIONS:  Dimensions:     Normal Values:  LA:     3.7 cm    2.0 - 4.0 cm  Ao:     2.9 cm    2.0 - 3.8 cm  SEPTUM: 0.7 cm    0.6 - 1.2 cm  PWT:    0.7 cm    0.6 - 1.1 cm  LVIDd:  5.8 cm    3.0 - 5.6 cm  LVIDs:  5.2 cm    1.8 - 4.0 cm      Derived Variables:  LVMI: 92 g/m2  RWT: 0.24  Ejection Fraction Visual Estimate: 20-25 %    ------------------------------------------------------------------------  OBSERVATIONS:  Mitral Valve: Mitral annular calcification, and calcified  mitral leaflets with normal diastolic opening. Tethered  mitral valve leaflets. Moderate mitral regurgitation.  Aortic Root: Normal aortic root.  Aortic Valve: Calcified aortic valve with normal opening.  Trace aortic regurgitation.  Left Atrium: Normal left atrium.  LA volume index = 16  cc/m2.  Left Ventricle: Severe segmental left ventricular systolic  dysfunction. The entire anterior wall, anteroseptum and  apex are akinetic. Mild left ventricular enlargement.  Right Heart: Normal right atrium. Normal right ventricular  size and function. A device lead is visualized in the right  heart. There is moderate tricuspid regurgitation. There is  trace pulmonicregurgitation.  Pericardium/PleuraNormal pericardium with no pericardial  effusion.  Hemodynamic: RA Pressure is 10 mm Hg. RV systolic pressure  is 68 mm Hg. Severe pulmonary hypertension.    < end of copied text >    < from: Xray Chest 1 View- PORTABLE-Routine (19 @ 10:51) >  IMPRESSION:    1.  Intra-aortic balloon pump 1.7 cm below the superior aspect of the   aortic knob.  2.  Ensign-Azar catheter with tip in the right main pulmonary artery.    < end of copied text > Date of Admission: 19    CHIEF COMPLAINT:    HISTORY OF PRESENT ILLNESS:    74 y/o HTN, HLD, DM II, HFrEF (20%), MI 8 years ago, CAD w/ stents (last in 2019), ICD, CKD. Patient has had numerous hospitlizations, 4 this year alone for CP and SOB (at [facility], [facility]). She initially presented to [facility] for CP, abodminal pain and SOB. She was found to have NSTEMI and was sent to [facility] for possible PCI. She had a LHC  which showed LM 95% stenosis, prox %, prox circ 20% at prior stent, OM I 30% at prior stent, mid RCA 30% prior stent.  RHC RA 13, PA 66/29/43, PCWP 28, PA sat 39%, CO 2.85, CI 1.78, PVR 5.27, SVR 1770. Pt admits to [facility] after a few steps for many months, also had PND, orthopnea. Currently she is feeling a lot better, no SOB at rest.       Allergies    No Known Allergies    Intolerances      MEDICATIONS:  aspirin enteric coated 81 milliGRAM(s) Oral daily  heparin  Infusion 1000 Unit(s)/Hr IV Continuous <Continuous>  isosorbide   dinitrate Tablet (ISORDIL) 10 milliGRAM(s) Oral three times a day  ticagrelor 90 milliGRAM(s) Oral every 12 hours          docusate sodium 100 milliGRAM(s) Oral two times a day  pantoprazole    Tablet 40 milliGRAM(s) Oral before breakfast  senna 2 Tablet(s) Oral at bedtime    atorvastatin 40 milliGRAM(s) Oral at bedtime  dextrose 40% Gel 15 Gram(s) Oral once PRN  dextrose 50% Injectable 12.5 Gram(s) IV Push once  dextrose 50% Injectable 25 Gram(s) IV Push once  glucagon  Injectable 1 milliGRAM(s) IntraMuscular once PRN  insulin glargine Injectable (LANTUS) 25 Unit(s) SubCutaneous at bedtime  insulin lispro (HumaLOG) corrective regimen sliding scale   SubCutaneous three times a day before meals  insulin lispro (HumaLOG) corrective regimen sliding scale   SubCutaneous at bedtime    chlorhexidine 4% Liquid 1 Application(s) Topical <User Schedule>  dextrose 5%. 1000 milliLiter(s) IV Continuous <Continuous>  sodium chloride 0.9% lock flush 3 milliLiter(s) IV Push every 8 hours      PAST MEDICAL & SURGICAL HISTORY:  Liver lesion  Stented coronary artery: 2011 at [facility]  2019 [facility]  NSTEMI (non-ST elevated myocardial infarction)  Diabetes mellitus type 2 in nonobese  Constipation  Anemia  RUTH (acute kidney injury)  CAD (coronary artery disease)  Chronic CHF: Systolic EF 20%  Hyperlipidemia, unspecified hyperlipidemia type  Hypertension, unspecified type  Implantable cardioverter-defibrillator (ICD) in situ      FAMILY HISTORY:  Family history of heart attack (Father):  76yo      SOCIAL HISTORY:    Non smoker, no ETOH or drug abuse     REVIEW OF SYSTEMS:  CONSTITUTIONAL: No fever, weight loss, or fatigue  EYES: No eye pain, visual disturbances, or discharge  ENMT:  No difficulty hearing, tinnitus, vertigo; No sinus or throat pain  NECK: No pain or stiffness  RESPIRATORY: No cough, wheezing, chills or hemoptysis; admits to Shortness of Breath  CARDIOVASCULAR: admits to prior chest pain. no palpitations, passing out, dizziness, or leg swelling  GASTROINTESTINAL: No abdominal or epigastric pain. No nausea, vomiting, or hematemesis; No diarrhea or constipation. No melena or hematochezia.  GENITOURINARY: No dysuria, frequency, hematuria, or incontinence  NEUROLOGICAL: No headaches, memory loss, loss of strength, numbness, or tremors  SKIN: No itching, burning, rashes, or lesions   LYMPH Nodes: No enlarged glands  ENDOCRINE: No heat or cold intolerance; No hair loss  MUSCULOSKELETAL: No joint pain or swelling; No muscle, back, or extremity pain  PSYCHIATRIC: No depression, anxiety, mood swings, or difficulty sleeping  HEME/LYMPH: No easy bruising, or bleeding gums  ALLERY AND IMMUNOLOGIC: No hives or eczema	    [ ] All others negative	  [ ] Unable to obtain    PHYSICAL EXAM:  T(C): 36.2 (19 @ 12:00), Max: 37.1 (19 @ 20:00)  HR: 93 (19 @ 13:00) (76 - 95)  BP: 114/62 (19 @ 05:00) (114/62 - 140/66)  RR: 16 (19 @ 13:00) (13 - 27)  SpO2: 99% (19 @ 13:00) (93% - 100%)  Wt(kg): --  I&O's Summary    2019 07:  -  2019 07:00  --------------------------------------------------------  IN: 731 mL / OUT: 2300 mL / NET: -1569 mL    2019 07:  -  2019 14:14  --------------------------------------------------------  IN: 640 mL / OUT: 600 mL / NET: 40 mL        Appearance: Normal	  HEENT:   Normal oral mucosa, PERRL, EOMI	  Lymphatic: No lymphadenopathy  Cardiovascular: Normal S1 S2, mildly elevated JVD, No murmurs, No edema  Respiratory: Lungs clear to auscultation	anteriorly   Psychiatry: A & O x 3, Mood & affect appropriate  Gastrointestinal:  Soft, Non-tender, + BS	  Skin: No rashes, No ecchymoses, No cyanosis	  Neurologic: Non-focal  Extremities: Normal range of motion, No clubbing, cyanosis or edema  Vascular: Peripheral pulses palpable 2+ bilaterally        LABS:	 	    CBC Full  -  ( 2019 04:43 )  WBC Count : 11.88 K/uL  Hemoglobin : 9.4 g/dL  Hematocrit : 28.7 %  Platelet Count - Automated : 234 K/uL  Mean Cell Volume : 81.5 fL  Mean Cell Hemoglobin : 26.7 pg  Mean Cell Hemoglobin Concentration : 32.8 %  Auto Neutrophil # : x  Auto Lymphocyte # : x  Auto Monocyte # : x  Auto Eosinophil # : x  Auto Basophil # : x  Auto Neutrophil % : x  Auto Lymphocyte % : x  Auto Monocyte % : x  Auto Eosinophil % : x  Auto Basophil % : x        137  |  97<L>  |  56<H>  ----------------------------<  213<H>  4.3   |  25  |  1.99<H>      137  |  99  |  49<H>  ----------------------------<  138<H>  4.3   |  24  |  1.84<H>    Ca    8.8      2019 04:43  Ca    8.6      2019 03:45  Phos  4.8     -  Phos  4.4     06-20  Mg     2.2     -  Mg     2.5     06-20    TPro  7.5  /  Alb  3.7  /  TBili  0.4  /  DBili  x   /  AST  30  /  ALT  21  /  AlkPhos  121<H>    TPro  6.6  /  Alb  2.9<L>  /  TBili  0.4  /  DBili  x   /  AST  18  /  ALT  15  /  AlkPhos  102  -    < from: Cardiac Cath Lab - Adult (19 @ 16:49) >  LM:   --  Distal left main: There was a tubular 95 % stenosis at a site  with no prior intervention. The lesion was hazy and eccentric. There was  SARA grade 2 flow through the vessel (partial perfusion).  LAD:   --  Proximal LAD: There was a 100 % stenosis. There was a  moderate-sized vascular territory distal to the lesion and poor collateral  blood supply to the distal myocardium. This lesion is a chronic total  occlusion. Distal vessel angiography showed complete occlusion.  CX:   --  Proximal circumflex: There was a tubular 20 % stenosis at the  site of a prior stent. The lesion was smoothly contoured. There was SARA  grade 3 flow through the vessel (brisk flow).  --  OM1: There was a tubular 30 % stenosis at the site of a prior stent.  The lesion was smoothly contoured. There was SARA grade 3 flow through the  vessel (brisk flow).  RCA:   --  Proximal RCA: There was a diffuse 20 % stenosis at the site of a  prior stent. The lesion was smoothly contoured. There was SARA grade 3  flow through the vessel (brisk flow).  --  Mid RCA: There was a diffuse 30 % stenosis at the site of a prior  stent. The lesion was smoothly contoured. There was ASRA grade 3 flow  through the vessel (brisk flow).  --  RPDA: Angiography showed mild atherosclerosis with no flow limiting  lesions.  --  RPLS: Angiography showed mild atherosclerosis with no flow limiting  lesions.  COMPLICATIONS: There were no complications.  DIAGNOSTIC IMPRESSIONS: Severe stenosis distal left main.  Proximal LAD is a , distal vessel is not visualized and is occluded.  Patent stents RCA  Elevated LVEDP, low cardiac output/index  IABP placed for hemodynamic support  DIAGNOSTIC RECOMMENDATIONS: Optimize heart failure therapy  GI evaluation and ? FNA of liver mass that may be suspicious for malignancy  PCI to LM into LCx once above issues resolved, creatinine improved and  anemia is stable.  INTERVENTIONAL IMPRESSIONS: Severe stenosis distal left main.  Proximal LAD is a , distal vessel is not visualized and is occluded.  Patent stents RCA  Elevated LVEDP, low cardiac output/index  IABP placed for hemodynamic support  INTERVENTIONAL RECOMMENDATIONS: Optimize heart failure therapy  GI evaluation and ? FNA of liver mass that may be suspicious for malignancy  PCI to LM into LCx once above issues resolved, creatinine improved and  anemia is stable.  Prepared and signed by  Bailey Lima M.D.  Signed 2019 14:28:33  HEMODYNAMIC TABLES  Pressures:  Baseline  Pressures:  - HR: 87  Pressures:  -Rhythm:  Pressures:  -- Aortic Pressure (S/D/M): 113/54/76  Pressures:  -- Left Ventricle (s/edp): 116/30/--  Pressures:  -- Pulmonary Artery (S/D/M): 66/29/43  Pressures:  -- Pulmonary Capillary Wedge: 29/33/28  Pressures:  -- Right Atrium (a/v/M): 16/17/13  Pressures:  -- Right Ventricle (s/edp): 67/16/--  O2 Sats:  Baseline  O2 Sats:  - HR: 87  O2 Sats:  - Rhythm:  O2 Sats:  -- AO: 8.6/100/11.7  O2 Sats:  -- PA: 8.6/39.8/4.66  Outputs:  Baseline  Outputs:  -- CALCULATIONS[age]  Outputs:  -- CALCULATIONS: Body Surface Area: 1.60  Outputs:  -- CALCULATIONS: Height in cm: 157.00  Outputs:  -- CALCULATIONS: Sex: Female  Outputs:  -- CALCULATIONS: Weight in k.30  Outputs:  -- OUTPUTS: CO by Koffi: 2.85  Outputs:  -- OUTPUTS:Koffi cardiac index: 1.78  Outputs:  -- OUTPUTS: O2 consumption: 200.42  Outputs:  -- OUTPUTS: Vo2 Indexed: 125.00  Outputs:  -- RESISTANCES: Left ventricular stroke work: 17.21  Outputs:  -- RESISTANCES: Left Ventricular Stroke Work index: 10.73  Outputs:  -- RESISTANCES: Pulmonary vascular index (dsc): 675.77  Outputs:  -- RESISTANCES: Pulmonary vascular index (Wood Units): 8.45  Outputs:  -- RESISTANCES: Pulmonary vascular resistance (dsc): 421.47  Outputs:  -- RESISTANCES: Pulmonary vascular resistance (Wood Units): 5.27  Outputs:  -- RESISTANCES: PVR_SVR Ratio: 0.24  Outputs:  -- RESISTANCES: Right ventricular stroke work: 12.76  Outputs:  -- RESISTANCES: Right ventricular stroke work index: 7.96  Outputs:  -- RESISTANCES: Systemic vascular index (dsc): 2838.22  Outputs:  -- RESISTANCES: Systemic vascular index (Wood Units): 35.49  Outputs:  -- RESISTANCES: Systemic vascular resistance (dsc): 1770.18  Outputs:  -- RESISTANCES: Systemic vascular resistance (Wood Units): 22.13  Outputs: -- RESISTANCES: Total pulmonary index (dsc): 1937.20  Outputs:  -- RESISTANCES: Total pulmonary index (Wood Units): 24.22  Outputs:  -- RESISTANCES: Total pulmonary resistance (dsc): 1208.22  Outputs:  -- RESISTANCES: Total pulmonary resistance (Wood Units): 15.11  Outputs:  -- RESISTANCES: Total vascular index (Wood Units): 42.81  Outputs:  -- RESISTANCES: Total vascular resistance (dsc): 2135.46  Outputs:  -- RESISTANCES: Total vascular resistance (Wood Units): 26.70  Outputs:  -- RESISTANCES:Total vascular resistance index (dsc): 3423.88  Outputs:  -- RESISTANCES: TPR_TVR Ratio: 0.57  Outputs:  -- SHUNTS: Pulmonary flow: 2.85  Outputs:  -- SHUNTS: Qp Indexed: 1.78  Outputs:  -- SHUNTS: Qs Indexed: 1.78  Outputs:  -- SHUNTS: Systemic flow: 2.85    < end of copied text >    < from: Transthoracic Echocardiogram (19 @ 09:00) >  DIMENSIONS:  Dimensions:     Normal Values:  LA:     3.7 cm    2.0 - 4.0 cm  Ao:     2.9 cm    2.0 - 3.8 cm  SEPTUM: 0.7 cm    0.6 - 1.2 cm  PWT:    0.7 cm    0.6 - 1.1 cm  LVIDd:  5.8 cm    3.0 - 5.6 cm  LVIDs:  5.2 cm    1.8 - 4.0 cm      Derived Variables:  LVMI: 92 g/m2  RWT: 0.24  Ejection Fraction Visual Estimate: 20-25 %    ------------------------------------------------------------------------  OBSERVATIONS:  Mitral Valve: Mitral annular calcification, and calcified  mitral leaflets with normal diastolic opening. Tethered  mitral valve leaflets. Moderate mitral regurgitation.  Aortic Root: Normal aortic root.  Aortic Valve: Calcified aortic valve with normal opening.  Trace aortic regurgitation.  Left Atrium: Normal left atrium.  LA volume index = 16  cc/m2.  Left Ventricle: Severe segmental left ventricular systolic  dysfunction. The entire anterior wall, anteroseptum and  apex are akinetic. Mild left ventricular enlargement.  Right Heart: Normal right atrium. Normal right ventricular  size and function. A device lead is visualized in the right  heart. There is moderate tricuspid regurgitation. There is  trace pulmonicregurgitation.  Pericardium/PleuraNormal pericardium with no pericardial  effusion.  Hemodynamic: RA Pressure is 10 mm Hg. RV systolic pressure  is 68 mm Hg. Severe pulmonary hypertension.    < end of copied text >    < from: Xray Chest 1 View- PORTABLE-Routine (19 @ 10:51) >  IMPRESSION:    1.  Intra-aortic balloon pump 1.7 cm below the superior aspect of the   aortic knob.  2.  Hamilton-Azar catheter with tip in the right main pulmonary artery.    < end of copied text >

## 2019-06-21 NOTE — PROGRESS NOTE ADULT - ASSESSMENT
73 year old female with CKd stage 4, HTN, hyperlipidemia, DM-II, CHF (EF 20%) w/ ICD in left chest, and CAD with multiple PTCA/stents, most recent Feb 2019 who transfer from [facility] for NSTEMI s/p cath found to have 99% LM and 100% LAD received intra-aortic balloon pump and was transferred to CCU post cath for further management. also noted to have liver lesion likely need biopsy     CKD stage 4  baseline ~ 1.7-1.9  fluctuates slightly likely sec to CHF  CKD likely sec to DM/HTN/CHF  s/p cath 6/19  renal function slightly worsen today, likely fluctuating from CHF vs contrast given 6/19  continue to monitor bmp, monitor I/O  avoid nephrotoxic agents  monitor bmp    ** possible triple phase CT with contrast for liver lesion per hepatology, will need please give mucomyst 1200 bid for 4 doses start 1 day prior to test, last dose after test, sodium bicarb 150meq/L in sterile water at 200cc/hr x 1 hr 1hr prior to test then 75cc/hr x 6 hr after.    HTN  controlled  monitor BP    proteinuria  check urine p/c ratio  likely sec to DM  vasculitis work up ordered outpatient but never done.     NSTEMI  f/u cardio    CKD-MBD  check PTH, phos level  monitor phos and calcium daily

## 2019-06-21 NOTE — PROGRESS NOTE ADULT - SUBJECTIVE AND OBJECTIVE BOX
Ricki Bella, PGY1  Pager: [phone]      Patient is a 73y old  Female who presents with a chief complaint of NSTEMI, Post- cath transfer to CCU (20 Jun 2019 10:31)      SUBJECTIVE / OVERNIGHT EVENTS:    Overnight, no acute events. Denies sob, cp. Reports right foot pain/cramping/stiffness.     MEDICATIONS  (STANDING):  aspirin enteric coated 81 milliGRAM(s) Oral daily  atorvastatin 40 milliGRAM(s) Oral at bedtime  chlorhexidine 4% Liquid 1 Application(s) Topical <User Schedule>  dextrose 5%. 1000 milliLiter(s) (50 mL/Hr) IV Continuous <Continuous>  dextrose 50% Injectable 12.5 Gram(s) IV Push once  dextrose 50% Injectable 25 Gram(s) IV Push once  docusate sodium 100 milliGRAM(s) Oral two times a day  furosemide   Injectable 40 milliGRAM(s) IV Push two times a day  heparin  Infusion 1000 Unit(s)/Hr (5 mL/Hr) IV Continuous <Continuous>  insulin glargine Injectable (LANTUS) 25 Unit(s) SubCutaneous at bedtime  insulin lispro (HumaLOG) corrective regimen sliding scale   SubCutaneous three times a day before meals  insulin lispro (HumaLOG) corrective regimen sliding scale   SubCutaneous at bedtime  isosorbide   dinitrate Tablet (ISORDIL) 10 milliGRAM(s) Oral three times a day  pantoprazole    Tablet 40 milliGRAM(s) Oral before breakfast  senna 2 Tablet(s) Oral at bedtime  sodium chloride 0.9% lock flush 3 milliLiter(s) IV Push every 8 hours  ticagrelor 90 milliGRAM(s) Oral every 12 hours    MEDICATIONS  (PRN):  dextrose 40% Gel 15 Gram(s) Oral once PRN Blood Glucose LESS THAN 70 milliGRAM(s)/deciliter  glucagon  Injectable 1 milliGRAM(s) IntraMuscular once PRN Glucose LESS THAN 70 milligrams/deciliter      Vital Signs Last 24 Hrs  T(C): 36.2 (21 Jun 2019 04:00), Max: 37.1 (20 Jun 2019 20:00)  T(F): 97.2 (21 Jun 2019 04:00), Max: 98.8 (20 Jun 2019 20:00)  HR: 77 (21 Jun 2019 07:00) (76 - 95)  BP: 114/62 (21 Jun 2019 05:00) (114/62 - 140/66)  BP(mean): 75 (21 Jun 2019 05:00) (69 - 91)  RR: 13 (21 Jun 2019 07:00) (13 - 27)  SpO2: 99% (21 Jun 2019 07:00) (93% - 100%)  CAPILLARY BLOOD GLUCOSE      POCT Blood Glucose.: 180 mg/dL (21 Jun 2019 07:57)  POCT Blood Glucose.: 227 mg/dL (20 Jun 2019 21:17)  POCT Blood Glucose.: 247 mg/dL (20 Jun 2019 16:45)  POCT Blood Glucose.: 238 mg/dL (20 Jun 2019 12:04)    I&O's Summary    20 Jun 2019 07:01  -  21 Jun 2019 07:00  --------------------------------------------------------  IN: 731 mL / OUT: 2300 mL / NET: -1569 mL        PHYSICAL EXAM:  GENERAL: NAD   CHEST/LUNG: CTABL ; No wheeze  HEART: RRR; No murmurs  ABDOMEN: Soft, Nontender, Nondistended; Bowel sounds present  :  Female condom catheter  EXTREMITIES:  No edema; Pulses intact  PSYCH: AAOx3  NEUROLOGY: no gross focal deficits    LABS:                        9.4    11.88 )-----------( 234      ( 21 Jun 2019 04:43 )             28.7     06-21    137  |  97<L>  |  56<H>  ----------------------------<  213<H>  4.3   |  25  |  1.99<H>    Ca    8.8      21 Jun 2019 04:43  Phos  4.8     06-21  Mg     2.2     06-21    TPro  7.5  /  Alb  3.7  /  TBili  0.4  /  DBili  x   /  AST  30  /  ALT  21  /  AlkPhos  121<H>  06-21    PT/INR - ( 19 Jun 2019 21:17 )   PT: 12.3 SEC;   INR: 1.10          PTT - ( 21 Jun 2019 04:43 )  PTT:122.0 SEC          Microbiology;        RADIOLOGY & ADDITIONAL TESTS:

## 2019-06-21 NOTE — PROGRESS NOTE ADULT - ATTENDING COMMENTS
Patient was seen and examined,interim events noted,labs and radiology studies reviewed.  Salvador Hood MD,FACC.  [address] [place] [address].  [facility].  [phone]

## 2019-06-21 NOTE — PROGRESS NOTE ADULT - ATTENDING COMMENTS
Case discussed with Dr. 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 AFP is negatife. Given the severity of her cardiac disease, recommend proceeding with necessary cardiac intervention, if a triple phase CT cannot be done due to concern about further IV contrast and her RUTH. Discussed with primary team.

## 2019-06-21 NOTE — PROGRESS NOTE ADULT - PROBLEM SELECTOR PLAN 6
Patient will need heart failure optimization.   RIGHT HEART CATH AND SWAN CO 2.85 index 1.79 EDP 34  -She has a SWAN in the right groin, will monitor BO and blood gas  -s/p lasix gtt now with wedge ~3-5  - will hold diuresis for now  -trend CVP.

## 2019-06-21 NOTE — PROGRESS NOTE ADULT - ASSESSMENT
73 year old female with HTN, hyperlipidemia, DM-II, CHF (EF 20%) w/ ICD in left chest, and CAD with multiple PTCA/stents, most recent Feb 2019 who presented to  ED 6/16/19 complaining of abdominal pain x 1 week, found to have 2 hepatic lesion on CT non-contrast, c/b NSTEMI transferred to [facility] for catheterization with 99% Left main and 100% LAD, with IABP placed on nitro gtt &hep gtt. Briefly on lasix gtt, transitioned to IVP. Awaiting for liver lesion workup prior to potential staged PCI

## 2019-06-21 NOTE — PROGRESS NOTE ADULT - PROBLEM SELECTOR PLAN 6
Patient will need heart failure optimization.   RIGHT HEART CATH AND SWAN CO 2.85 index 1.79 EDP 34  -She has a SWAN in the right groin, will monitor BO and blood gas  -s/p lasix gtt now with wedge ~3-5  - now on lasix IV BID - will likely transition to PO  -trend CVP. Patient will need heart failure optimization.   RIGHT HEART CATH AND SWAN CO 2.85 index 1.79 EDP 34  -She has a SWAN in the right groin, will monitor BO and blood gas  -s/p lasix gtt now with wedge ~3-5  - will hold diuresis for now  -trend CVP.

## 2019-06-21 NOTE — PROGRESS NOTE ADULT - ATTENDING COMMENTS
AGree with above assessment and plan  Patient with LAD disease and IABP support for ischemia  Off Tridil and on low dose oral nitrates  Consider Revascularization today  HF consult  Continue current meds

## 2019-06-21 NOTE — CHART NOTE - NSCHARTNOTEFT_GEN_A_CORE
Right radial band removed; Large hematoma noted from wrist to elbow on RUE; tender to palpation; manual pressure applied via manual pressure cuff with improvement of hematoma and pain; pulses distal 2+; capillary refill <3 sec; no complaints of numbness or tingling; clean dry dressing applied     RUE doppler studies ordered   will continue to monitor

## 2019-06-21 NOTE — PROGRESS NOTE ADULT - SUBJECTIVE AND OBJECTIVE BOX
No acute overnight events.       Outpatient GI Provider:    PAST MEDICAL & SURGICAL HISTORY:  Liver lesion  Stented coronary artery: 2011 at [facility]  February 2019 [facility]  NSTEMI (non-ST elevated myocardial infarction)  Diabetes mellitus type 2 in nonobese  Constipation  Anemia  RUTH (acute kidney injury)  CAD (coronary artery disease)  Chronic CHF: Systolic EF 20%  Hyperlipidemia, unspecified hyperlipidemia type  Hypertension, unspecified type  Implantable cardioverter-defibrillator (ICD) in situ      Review of Systems: Negative except as per HPI.    MEDICATIONS  (STANDING):  aspirin enteric coated 81 milliGRAM(s) Oral daily  atorvastatin 40 milliGRAM(s) Oral at bedtime  chlorhexidine 4% Liquid 1 Application(s) Topical <User Schedule>  dextrose 5%. 1000 milliLiter(s) (50 mL/Hr) IV Continuous <Continuous>  dextrose 50% Injectable 12.5 Gram(s) IV Push once  dextrose 50% Injectable 25 Gram(s) IV Push once  docusate sodium 100 milliGRAM(s) Oral two times a day  heparin  Infusion 1000 Unit(s)/Hr (5 mL/Hr) IV Continuous <Continuous>  insulin glargine Injectable (LANTUS) 25 Unit(s) SubCutaneous at bedtime  insulin lispro (HumaLOG) corrective regimen sliding scale   SubCutaneous three times a day before meals  insulin lispro (HumaLOG) corrective regimen sliding scale   SubCutaneous at bedtime  isosorbide   dinitrate Tablet (ISORDIL) 10 milliGRAM(s) Oral three times a day  pantoprazole    Tablet 40 milliGRAM(s) Oral before breakfast  senna 2 Tablet(s) Oral at bedtime  sodium chloride 0.9% lock flush 3 milliLiter(s) IV Push every 8 hours  ticagrelor 90 milliGRAM(s) Oral every 12 hours    MEDICATIONS  (PRN):  dextrose 40% Gel 15 Gram(s) Oral once PRN Blood Glucose LESS THAN 70 milliGRAM(s)/deciliter  glucagon  Injectable 1 milliGRAM(s) IntraMuscular once PRN Glucose LESS THAN 70 milligrams/deciliter      Vital Signs Last 24 Hrs  T(C): 36.2 (21 Jun 2019 08:00), Max: 37.1 (20 Jun 2019 20:00)  T(F): 97.2 (21 Jun 2019 08:00), Max: 98.8 (20 Jun 2019 20:00)  HR: 86 (21 Jun 2019 11:00) (76 - 95)  BP: 114/62 (21 Jun 2019 05:00) (114/62 - 140/66)  BP(mean): 75 (21 Jun 2019 05:00) (69 - 91)  RR: 23 (21 Jun 2019 11:00) (13 - 27)  SpO2: 98% (21 Jun 2019 11:00) (93% - 100%)    PHYSICAL EXAM:  Constitutional: no acute distress  Eyes: no icterus  Neck: no masses, no LAD  Respiratory: normal inspiratory effort; no wheezing or crackles  Cardiovascular: RRR, normal S1/S2, no murmurs/rubs/gallops  Gastrointestinal: soft, nondistended, nontender, +BS  Extremities: no LE edema  Neurological: AAOx3, no asterixis  Skin: no rashes, bruises, petechiae      LABS:                        9.4    11.88 )-----------( 234      ( 21 Jun 2019 04:43 )             28.7     06-21    137  |  97<L>  |  56<H>  ----------------------------<  213<H>  4.3   |  25  |  1.99<H>    Ca    8.8      21 Jun 2019 04:43  Phos  4.8     06-21  Mg     2.2     06-21    TPro  7.5  /  Alb  3.7  /  TBili  0.4  /  DBili  x   /  AST  30  /  ALT  21  /  AlkPhos  121<H>  06-21    PT/INR - ( 19 Jun 2019 21:17 )   PT: 12.3 SEC;   INR: 1.10          PTT - ( 21 Jun 2019 04:43 )  PTT:122.0 SEC  LIVER FUNCTIONS - ( 21 Jun 2019 04:43 )  Alb: 3.7 g/dL / Pro: 7.5 g/dL / ALK PHOS: 121 u/L / ALT: 21 u/L / AST: 30 u/L / GGT: x             RADIOLOGY & ADDITIONAL STUDIES:  < from: CT Abdomen and Pelvis No Cont (06.17.19 @ 12:25) >  IMPRESSION:     Evaluation of the solid organs and GI tract is limited without oral and   IV contrast.    2 hepatic lesions in the lateral segment left hepatic lobe containing   macroscopic fat. Consider hepatocellular carcinoma if patient is   cirrhotic. Otherwise, angiomyolipoma or an FNH could be considered. Other   etiologies are not excluded. Pacemaker precludes MRI. Recommend   contrast-enhanced multiphase CT with liver protocol.    No retroperitoneal hematoma or intra-abdominal hemorrhage.    Bilateral pleural effusions. Pacemaker.    Fibroid uterus.    Severe acquired L4-5 spinal stenosis.      < end of copied text >

## 2019-06-22 ENCOUNTER — TRANSCRIPTION ENCOUNTER (OUTPATIENT)
Age: 74
End: 2019-06-22

## 2019-06-22 LAB
ALBUMIN SERPL ELPH-MCNC: 3.1 G/DL — LOW (ref 3.3–5)
ALP SERPL-CCNC: 110 U/L — SIGNIFICANT CHANGE UP (ref 40–120)
ALT FLD-CCNC: 17 U/L — SIGNIFICANT CHANGE UP (ref 4–33)
ANION GAP SERPL CALC-SCNC: 15 MMO/L — HIGH (ref 7–14)
APTT BLD: 76.8 SEC — HIGH (ref 27.5–36.3)
APTT BLD: 86.1 SEC — HIGH (ref 27.5–36.3)
AST SERPL-CCNC: 19 U/L — SIGNIFICANT CHANGE UP (ref 4–32)
BASE EXCESS BLDV CALC-SCNC: 4 MMOL/L — SIGNIFICANT CHANGE UP
BILIRUB SERPL-MCNC: 0.3 MG/DL — SIGNIFICANT CHANGE UP (ref 0.2–1.2)
BLOOD GAS VENOUS - CREATININE: 1.83 MG/DL — HIGH (ref 0.5–1.3)
BLOOD GAS VENOUS - FIO2: 30 — SIGNIFICANT CHANGE UP
BUN SERPL-MCNC: 58 MG/DL — HIGH (ref 7–23)
CALCIUM SERPL-MCNC: 8.5 MG/DL — SIGNIFICANT CHANGE UP (ref 8.4–10.5)
CHLORIDE BLDV-SCNC: 105 MMOL/L — SIGNIFICANT CHANGE UP (ref 96–108)
CHLORIDE SERPL-SCNC: 98 MMOL/L — SIGNIFICANT CHANGE UP (ref 98–107)
CO2 SERPL-SCNC: 24 MMOL/L — SIGNIFICANT CHANGE UP (ref 22–31)
CREAT SERPL-MCNC: 1.79 MG/DL — HIGH (ref 0.5–1.3)
GAS PNL BLDV: 135 MMOL/L — LOW (ref 136–146)
GLUCOSE BLDC GLUCOMTR-MCNC: 194 MG/DL — HIGH (ref 70–99)
GLUCOSE BLDC GLUCOMTR-MCNC: 206 MG/DL — HIGH (ref 70–99)
GLUCOSE BLDC GLUCOMTR-MCNC: 264 MG/DL — HIGH (ref 70–99)
GLUCOSE BLDC GLUCOMTR-MCNC: 294 MG/DL — HIGH (ref 70–99)
GLUCOSE BLDV-MCNC: 175 MG/DL — HIGH (ref 70–99)
GLUCOSE SERPL-MCNC: 180 MG/DL — HIGH (ref 70–99)
HBV DNA # SERPL NAA+PROBE: NOT DETECTED — SIGNIFICANT CHANGE UP
HBV DNA SERPL NAA+PROBE-LOG#: SIGNIFICANT CHANGE UP LOGIU/ML
HCO3 BLDV-SCNC: 27 MMOL/L — SIGNIFICANT CHANGE UP (ref 20–27)
HCT VFR BLD CALC: 28.2 % — LOW (ref 34.5–45)
HCT VFR BLDV CALC: 29.1 % — LOW (ref 34.5–45)
HGB BLD-MCNC: 9.2 G/DL — LOW (ref 11.5–15.5)
HGB BLDV-MCNC: 9.4 G/DL — LOW (ref 11.5–15.5)
LACTATE BLDV-MCNC: 1.1 MMOL/L — SIGNIFICANT CHANGE UP (ref 0.5–2)
MAGNESIUM SERPL-MCNC: 2.1 MG/DL — SIGNIFICANT CHANGE UP (ref 1.6–2.6)
MCHC RBC-ENTMCNC: 26.4 PG — LOW (ref 27–34)
MCHC RBC-ENTMCNC: 32.6 % — SIGNIFICANT CHANGE UP (ref 32–36)
MCV RBC AUTO: 81 FL — SIGNIFICANT CHANGE UP (ref 80–100)
NRBC # FLD: 0 K/UL — SIGNIFICANT CHANGE UP (ref 0–0)
PCO2 BLDV: 45 MMHG — SIGNIFICANT CHANGE UP (ref 41–51)
PH BLDV: 7.42 PH — SIGNIFICANT CHANGE UP (ref 7.32–7.43)
PHOSPHATE SERPL-MCNC: 4.5 MG/DL — SIGNIFICANT CHANGE UP (ref 2.5–4.5)
PLATELET # BLD AUTO: 202 K/UL — SIGNIFICANT CHANGE UP (ref 150–400)
PMV BLD: 10.5 FL — SIGNIFICANT CHANGE UP (ref 7–13)
PO2 BLDV: 30 MMHG — LOW (ref 35–40)
POTASSIUM BLDV-SCNC: 3.9 MMOL/L — SIGNIFICANT CHANGE UP (ref 3.4–4.5)
POTASSIUM SERPL-MCNC: 4.1 MMOL/L — SIGNIFICANT CHANGE UP (ref 3.5–5.3)
POTASSIUM SERPL-SCNC: 4.1 MMOL/L — SIGNIFICANT CHANGE UP (ref 3.5–5.3)
PROT SERPL-MCNC: 7.1 G/DL — SIGNIFICANT CHANGE UP (ref 6–8.3)
RBC # BLD: 3.48 M/UL — LOW (ref 3.8–5.2)
RBC # FLD: 14 % — SIGNIFICANT CHANGE UP (ref 10.3–14.5)
SAO2 % BLDV: 49.6 % — LOW (ref 60–85)
SODIUM SERPL-SCNC: 137 MMOL/L — SIGNIFICANT CHANGE UP (ref 135–145)
WBC # BLD: 12.25 K/UL — HIGH (ref 3.8–10.5)
WBC # FLD AUTO: 12.25 K/UL — HIGH (ref 3.8–10.5)

## 2019-06-22 PROCEDURE — 99233 SBSQ HOSP IP/OBS HIGH 50: CPT | Mod: GC

## 2019-06-22 PROCEDURE — 71045 X-RAY EXAM CHEST 1 VIEW: CPT | Mod: 26

## 2019-06-22 RX ORDER — AMIODARONE HYDROCHLORIDE 400 MG/1
0.5 TABLET ORAL
Qty: 450 | Refills: 0 | Status: DISCONTINUED | OUTPATIENT
Start: 2019-06-22 | End: 2019-06-22

## 2019-06-22 RX ORDER — INSULIN GLARGINE 100 [IU]/ML
30 INJECTION, SOLUTION SUBCUTANEOUS AT BEDTIME
Refills: 0 | Status: DISCONTINUED | OUTPATIENT
Start: 2019-06-22 | End: 2019-06-30

## 2019-06-22 RX ORDER — OXYCODONE HYDROCHLORIDE 5 MG/1
5 TABLET ORAL ONCE
Refills: 0 | Status: DISCONTINUED | OUTPATIENT
Start: 2019-06-22 | End: 2019-06-22

## 2019-06-22 RX ADMIN — INSULIN GLARGINE 30 UNIT(S): 100 INJECTION, SOLUTION SUBCUTANEOUS at 22:11

## 2019-06-22 RX ADMIN — Medication 100 MILLIGRAM(S): at 05:10

## 2019-06-22 RX ADMIN — SODIUM CHLORIDE 3 MILLILITER(S): 9 INJECTION INTRAMUSCULAR; INTRAVENOUS; SUBCUTANEOUS at 05:09

## 2019-06-22 RX ADMIN — SODIUM CHLORIDE 3 MILLILITER(S): 9 INJECTION INTRAMUSCULAR; INTRAVENOUS; SUBCUTANEOUS at 14:03

## 2019-06-22 RX ADMIN — Medication 2: at 22:12

## 2019-06-22 RX ADMIN — Medication 650 MILLIGRAM(S): at 04:01

## 2019-06-22 RX ADMIN — Medication 6: at 17:07

## 2019-06-22 RX ADMIN — ISOSORBIDE DINITRATE 10 MILLIGRAM(S): 5 TABLET ORAL at 05:10

## 2019-06-22 RX ADMIN — TICAGRELOR 90 MILLIGRAM(S): 90 TABLET ORAL at 22:00

## 2019-06-22 RX ADMIN — Medication 650 MILLIGRAM(S): at 04:31

## 2019-06-22 RX ADMIN — TICAGRELOR 90 MILLIGRAM(S): 90 TABLET ORAL at 09:19

## 2019-06-22 RX ADMIN — HEPARIN SODIUM 5 UNIT(S)/HR: 5000 INJECTION INTRAVENOUS; SUBCUTANEOUS at 04:02

## 2019-06-22 RX ADMIN — CHLORHEXIDINE GLUCONATE 1 APPLICATION(S): 213 SOLUTION TOPICAL at 07:17

## 2019-06-22 RX ADMIN — ISOSORBIDE DINITRATE 10 MILLIGRAM(S): 5 TABLET ORAL at 22:01

## 2019-06-22 RX ADMIN — HEPARIN SODIUM 5 UNIT(S)/HR: 5000 INJECTION INTRAVENOUS; SUBCUTANEOUS at 22:00

## 2019-06-22 RX ADMIN — Medication 4: at 08:06

## 2019-06-22 RX ADMIN — OXYCODONE HYDROCHLORIDE 5 MILLIGRAM(S): 5 TABLET ORAL at 06:09

## 2019-06-22 RX ADMIN — ISOSORBIDE DINITRATE 10 MILLIGRAM(S): 5 TABLET ORAL at 14:15

## 2019-06-22 RX ADMIN — HEPARIN SODIUM 5 UNIT(S)/HR: 5000 INJECTION INTRAVENOUS; SUBCUTANEOUS at 07:17

## 2019-06-22 RX ADMIN — SENNA PLUS 2 TABLET(S): 8.6 TABLET ORAL at 22:00

## 2019-06-22 RX ADMIN — SODIUM CHLORIDE 3 MILLILITER(S): 9 INJECTION INTRAMUSCULAR; INTRAVENOUS; SUBCUTANEOUS at 19:59

## 2019-06-22 RX ADMIN — Medication 100 MILLIGRAM(S): at 18:30

## 2019-06-22 RX ADMIN — PANTOPRAZOLE SODIUM 40 MILLIGRAM(S): 20 TABLET, DELAYED RELEASE ORAL at 05:10

## 2019-06-22 RX ADMIN — OXYCODONE HYDROCHLORIDE 5 MILLIGRAM(S): 5 TABLET ORAL at 06:39

## 2019-06-22 RX ADMIN — Medication 2: at 11:59

## 2019-06-22 RX ADMIN — Medication 81 MILLIGRAM(S): at 12:00

## 2019-06-22 RX ADMIN — ATORVASTATIN CALCIUM 40 MILLIGRAM(S): 80 TABLET, FILM COATED ORAL at 22:00

## 2019-06-22 NOTE — PROGRESS NOTE ADULT - ASSESSMENT
73 year old female with HTN, hyperlipidemia, DM-II, CHF (EF 20%) w/ ICD in left chest, and CAD with multiple PTCA/stents, most recent Feb 2019 who presented to  ED 6/16/19 complaining of abdominal pain x 1 week, found to have 2 hepatic lesion on CT non-contrast, c/b NSTEMI transferred to [facility] for catheterization with 99% Left main and 100% LAD, with IABP placed on nitro gtt &hep gtt. Briefly on lasix gtt, transitioned to IVP.  Liver lesion likely just angiomyolipoma, and not malignant. Patient went for staged PCI to left main on 6/21

## 2019-06-22 NOTE — PROGRESS NOTE ADULT - PROBLEM SELECTOR PLAN 1
Balloon pump 1:1 - will hold off on weaning; hypotensive off IABP  Nitro gtt titrated off  continue ASA and Brilinta and lipitor 40  -currently chest pain free

## 2019-06-22 NOTE — PROGRESS NOTE ADULT - PROBLEM SELECTOR PLAN 1
Balloon pump 1:1 -> 2:1 today then remove tomorrow  Nitro gtt titrated off  continue ASA and Brilinta and lipitor 40  -currently chest pain free Balloon pump 1:1 - will hold off on weaning; hypotensive off IABP  Nitro gtt titrated off  continue ASA and Brilinta and lipitor 40  -currently chest pain free

## 2019-06-22 NOTE — PROGRESS NOTE ADULT - ATTENDING COMMENTS
Thank you for the courtesy of the consultation,I would be available for any further discussion if needed.  Salvador Hood MD,FACC.  [address] [place], [place] [id number] [phone]

## 2019-06-22 NOTE — PROGRESS NOTE ADULT - PROBLEM SELECTOR PLAN 3
Evaluation and work up regarding the liver mass found on CT scan  - ICD is NOT MRI compatible  - d/w hepatology and per discussion with radiology, liver lesion is likely to be benign; and given RUTH, MRI incompatability - it would make sense to proceed to revacularize and place stents.  - will not obtain triple phase CT w/ contrast for now given multiple contrast loads recently

## 2019-06-22 NOTE — PROGRESS NOTE ADULT - PROBLEM SELECTOR PLAN 9
will continue lantus   25 units qhs, (home dose is 35units) and fingersticks QAC and QHS, as well as ISS.

## 2019-06-22 NOTE — PROGRESS NOTE ADULT - SUBJECTIVE AND OBJECTIVE BOX
Ricki Bella, PGY1  Pager: [phone]      Patient is a 73y old  Female who presents with a chief complaint of NSTEMI, Post- cath transfer to CCU (20 Jun 2019 10:31)      SUBJECTIVE / OVERNIGHT EVENTS:    Pt went for staged PCI to left main with laser. Patient with massive hematoma of arm of radial site. No neurologic or vascular compromise overnight.     MEDICATIONS  (STANDING):  aspirin enteric coated 81 milliGRAM(s) Oral daily  atorvastatin 40 milliGRAM(s) Oral at bedtime  chlorhexidine 4% Liquid 1 Application(s) Topical <User Schedule>  dextrose 5%. 1000 milliLiter(s) (50 mL/Hr) IV Continuous <Continuous>  dextrose 50% Injectable 12.5 Gram(s) IV Push once  dextrose 50% Injectable 25 Gram(s) IV Push once  docusate sodium 100 milliGRAM(s) Oral two times a day  heparin  Infusion 1000 Unit(s)/Hr (5 mL/Hr) IV Continuous <Continuous>  insulin glargine Injectable (LANTUS) 25 Unit(s) SubCutaneous at bedtime  insulin lispro (HumaLOG) corrective regimen sliding scale   SubCutaneous three times a day before meals  insulin lispro (HumaLOG) corrective regimen sliding scale   SubCutaneous at bedtime  isosorbide   dinitrate Tablet (ISORDIL) 10 milliGRAM(s) Oral three times a day  pantoprazole    Tablet 40 milliGRAM(s) Oral before breakfast  senna 2 Tablet(s) Oral at bedtime  sodium chloride 0.9% lock flush 3 milliLiter(s) IV Push every 8 hours  ticagrelor 90 milliGRAM(s) Oral every 12 hours    MEDICATIONS  (PRN):  acetaminophen   Tablet .. 650 milliGRAM(s) Oral every 6 hours PRN Mild Pain (1 - 3), Moderate Pain (4 - 6), Severe Pain (7 - 10)  dextrose 40% Gel 15 Gram(s) Oral once PRN Blood Glucose LESS THAN 70 milliGRAM(s)/deciliter  glucagon  Injectable 1 milliGRAM(s) IntraMuscular once PRN Glucose LESS THAN 70 milligrams/deciliter      Vital Signs Last 24 Hrs  T(C): 36.2 (22 Jun 2019 07:00), Max: 36.7 (21 Jun 2019 19:45)  T(F): 97.2 (22 Jun 2019 07:00), Max: 98.1 (21 Jun 2019 19:45)  HR: 83 (22 Jun 2019 07:00) (82 - 95)  BP: 134/58 (22 Jun 2019 04:20) (124/55 - 134/58)  BP(mean): 77 (22 Jun 2019 04:20) (72 - 77)  RR: 19 (22 Jun 2019 07:00) (13 - 23)  SpO2: 99% (22 Jun 2019 07:00) (93% - 100%)  CAPILLARY BLOOD GLUCOSE      POCT Blood Glucose.: 230 mg/dL (21 Jun 2019 21:26)  POCT Blood Glucose.: 286 mg/dL (21 Jun 2019 17:08)  POCT Blood Glucose.: 168 mg/dL (21 Jun 2019 11:59)  POCT Blood Glucose.: 180 mg/dL (21 Jun 2019 07:57)    I&O's Summary    21 Jun 2019 07:01  -  22 Jun 2019 07:00  --------------------------------------------------------  IN: 853 mL / OUT: 1450 mL / NET: -597 mL        PHYSICAL EXAM:  GENERAL: NAD   CHEST/LUNG: CTABL ; No wheeze  HEART: RRR; No murmurs  ABDOMEN: Soft, Nontender, Nondistended; Bowel sounds present  :  Female condom cath  EXTREMITIES:  No edema  PSYCH: AAOx3  NEUROLOGY: no gross focal deficits    LABS:                        9.2    12.25 )-----------( 202      ( 22 Jun 2019 04:09 )             28.2     06-22    137  |  98  |  58<H>  ----------------------------<  180<H>  4.1   |  24  |  1.79<H>    Ca    8.5      22 Jun 2019 04:09  Phos  4.5     06-22  Mg     2.1     06-22    TPro  7.1  /  Alb  3.1<L>  /  TBili  0.3  /  DBili  x   /  AST  19  /  ALT  17  /  AlkPhos  110  06-22    PTT - ( 21 Jun 2019 11:50 )  PTT:85.3 SEC          Microbiology;        RADIOLOGY & ADDITIONAL TESTS: Ricki Bella, PGY1  Pager: [phone]      Patient is a 73y old  Female who presents with a chief complaint of NSTEMI, Post- cath transfer to CCU (20 Jun 2019 10:31)      SUBJECTIVE / OVERNIGHT EVENTS:    Pt went for staged PCI to left main with laser. Patient with massive hematoma of arm of radial site. No neurologic or vascular compromise overnight.     MEDICATIONS  (STANDING):  aspirin enteric coated 81 milliGRAM(s) Oral daily  atorvastatin 40 milliGRAM(s) Oral at bedtime  chlorhexidine 4% Liquid 1 Application(s) Topical <User Schedule>  dextrose 5%. 1000 milliLiter(s) (50 mL/Hr) IV Continuous <Continuous>  dextrose 50% Injectable 12.5 Gram(s) IV Push once  dextrose 50% Injectable 25 Gram(s) IV Push once  docusate sodium 100 milliGRAM(s) Oral two times a day  heparin  Infusion 1000 Unit(s)/Hr (5 mL/Hr) IV Continuous <Continuous>  insulin glargine Injectable (LANTUS) 25 Unit(s) SubCutaneous at bedtime  insulin lispro (HumaLOG) corrective regimen sliding scale   SubCutaneous three times a day before meals  insulin lispro (HumaLOG) corrective regimen sliding scale   SubCutaneous at bedtime  isosorbide   dinitrate Tablet (ISORDIL) 10 milliGRAM(s) Oral three times a day  pantoprazole    Tablet 40 milliGRAM(s) Oral before breakfast  senna 2 Tablet(s) Oral at bedtime  sodium chloride 0.9% lock flush 3 milliLiter(s) IV Push every 8 hours  ticagrelor 90 milliGRAM(s) Oral every 12 hours    MEDICATIONS  (PRN):  acetaminophen   Tablet .. 650 milliGRAM(s) Oral every 6 hours PRN Mild Pain (1 - 3), Moderate Pain (4 - 6), Severe Pain (7 - 10)  dextrose 40% Gel 15 Gram(s) Oral once PRN Blood Glucose LESS THAN 70 milliGRAM(s)/deciliter  glucagon  Injectable 1 milliGRAM(s) IntraMuscular once PRN Glucose LESS THAN 70 milligrams/deciliter      Vital Signs Last 24 Hrs  T(C): 36.2 (22 Jun 2019 07:00), Max: 36.7 (21 Jun 2019 19:45)  T(F): 97.2 (22 Jun 2019 07:00), Max: 98.1 (21 Jun 2019 19:45)  HR: 83 (22 Jun 2019 07:00) (82 - 95)  BP: 134/58 (22 Jun 2019 04:20) (124/55 - 134/58)  BP(mean): 77 (22 Jun 2019 04:20) (72 - 77)  RR: 19 (22 Jun 2019 07:00) (13 - 23)  SpO2: 99% (22 Jun 2019 07:00) (93% - 100%)  CAPILLARY BLOOD GLUCOSE      POCT Blood Glucose.: 230 mg/dL (21 Jun 2019 21:26)  POCT Blood Glucose.: 286 mg/dL (21 Jun 2019 17:08)  POCT Blood Glucose.: 168 mg/dL (21 Jun 2019 11:59)  POCT Blood Glucose.: 180 mg/dL (21 Jun 2019 07:57)    I&O's Summary    21 Jun 2019 07:01  -  22 Jun 2019 07:00  --------------------------------------------------------  IN: 853 mL / OUT: 1450 mL / NET: -597 mL        PHYSICAL EXAM:  GENERAL: NAD   CHEST/LUNG: CTABL ; No wheeze  HEART: RRR; No murmurs  ABDOMEN: Soft, Nontender, Nondistended; Bowel sounds present  :  Female condom cath  EXTREMITIES:  R forearm/wrist hematoma  PSYCH: AAOx3  NEUROLOGY: no gross focal deficits    LABS:                        9.2    12.25 )-----------( 202      ( 22 Jun 2019 04:09 )             28.2     06-22    137  |  98  |  58<H>  ----------------------------<  180<H>  4.1   |  24  |  1.79<H>    Ca    8.5      22 Jun 2019 04:09  Phos  4.5     06-22  Mg     2.1     06-22    TPro  7.1  /  Alb  3.1<L>  /  TBili  0.3  /  DBili  x   /  AST  19  /  ALT  17  /  AlkPhos  110  06-22    PTT - ( 21 Jun 2019 11:50 )  PTT:85.3 SEC          Microbiology;        RADIOLOGY & ADDITIONAL TESTS:

## 2019-06-22 NOTE — PROGRESS NOTE ADULT - ASSESSMENT
73 year old female with HTN, hyperlipidemia, DM-II, CHF (EF 20%) w/ ICD in left chest, and CAD with multiple PTCA/stents, most recent Feb 2019 who presented to  ED 6/16/19 complaining of abdominal pain x 1 week, found to have 2 hepatic lesion on CT non-contrast, c/b NSTEMI transferred to [facility] for catheterization with 99% Left main and 100% LAD, with IABP placed on nitro gtt &hep gtt. Briefly on lasix gtt, transitioned to IVP.  Liver lesion likely just angiomyolipoma, and not malignant. Patient went for staged PCI to left main on 6/21

## 2019-06-22 NOTE — PROGRESS NOTE ADULT - PROBLEM SELECTOR PROBLEM 10
VTE (venous thromboembolism)

## 2019-06-22 NOTE — PROGRESS NOTE ADULT - PROBLEM SELECTOR PLAN 6
Patient will need heart failure optimization.   RIGHT HEART CATH AND SWAN    -She has a SWAN in the right groin, will monitor BO and blood gas  -s/p lasix gtt  with wedge ~3-5; was given 250 cc bolus with improvement in CO  - will hold diuresis for now  -trend CVP.

## 2019-06-22 NOTE — PROGRESS NOTE ADULT - PROBLEM SELECTOR PLAN 10
on a heparin gtt for balloon pump 1:1, this will also be sufficient for VTE prophylaxis.

## 2019-06-22 NOTE — DISCHARGE NOTE PROVIDER - HOSPITAL COURSE
Patient is a 73 year old female with PMH significant for CAD with multiple stents (most recent in Feb 2019), systolic heart failure, EF 20% s/p ICD, HTN, HLD, DM, presented to [facility] on June 16th with abdominal pain. In ER, patient had abnormal EKG and elevated troponin with an RUTH. Patient was admitted to [facility] with NSTEMI and ACS protocol initiated with heparin gtt. Due to elevated creatinine, cath was not performed urgently. Patient also with an anemia. [facility] also performed a CT Abd/pelvis and patient was found to have 2 hepatic lesions in the lateral segment of the left hepatic lobe, will need further testing to determine hepatic carcinoma vs. angiomyolipoma vs focal nodular hyperplasia (benign tumor). Pacemaker precludes MRI. At [facility], GI Recommend contrast-enhanced multiphase CT with liver protocol.         Patient was transferred to [facility] for diagnostic cardiac catheterization in  light of patients CAD history, symptoms and abnormal noninvasive test findings. In cath lab, patient was found to have 99% left main with 100% LAD, received intra-aortic balloon pump and was transferred to CCU post cath for further management. Patient will possibly need a liver biopsy prior to stenting and committing to dual antiplatelet therapy, a GI work up will be needed. Patient diuresed on lasix gtt, but wedge pressures were low, so were discontinued. Patient with RUTH on CKD, and due to concerns for further contrast loads, GI was okay with stenting without further imaging as they felt malignancy was unlikely. Patient had laser and stent to left main. Patient is a 73 year old female with PMH significant for CAD with multiple stents (most recent in Feb 2019), systolic heart failure, EF 20% s/p ICD, HTN, HLD, DM, presented to [facility] on June 16th with abdominal pain. In ER, patient had abnormal EKG and elevated troponin with an RUTH. Patient was admitted to [facility] with NSTEMI and ACS protocol initiated with heparin gtt. Due to elevated creatinine, cath was not performed urgently. Patient also with an anemia. [facility] also performed a CT Abd/pelvis and patient was found to have 2 hepatic lesions in the lateral segment of the left hepatic lobe, will need further testing to determine hepatic carcinoma vs. angiomyolipoma vs focal nodular hyperplasia (benign tumor). Pacemaker precludes MRI. At [facility], GI Recommend contrast-enhanced multiphase CT with liver protocol.         Patient was transferred to [facility] for diagnostic cardiac catheterization in  light of patients CAD history, symptoms and abnormal noninvasive test findings. In cath lab, patient was found to have 99% left main with 100% LAD, received intra-aortic balloon pump and was transferred to CCU post cath for further management. Patient will possibly need a liver biopsy prior to stenting and committing to dual antiplatelet therapy, a GI work up will be needed. Patient diuresed on lasix gtt, but wedge pressures were low, so were discontinued. Patient with RUTH on CKD, and due to concerns for further contrast loads, GI was okay with stenting without further imaging as they felt malignancy was unlikely. Patient had laser and stent to left main on 6/21. Patient is a 73 year old female with PMH significant for CAD with multiple stents (most recent in Feb 2019), systolic heart failure, EF 20% s/p ICD, HTN, HLD, DM, presented to [facility] on June 16th with abdominal pain. In ER, patient had abnormal EKG and elevated troponin with an RUTH. Patient was admitted to [facility] with NSTEMI and ACS protocol initiated with heparin gtt. Due to elevated creatinine, cath was not performed urgently. Patient also with an anemia. [facility] also performed a CT Abd/pelvis and patient was found to have 2 hepatic lesions in the lateral segment of the left hepatic lobe, will need further testing to determine hepatic carcinoma vs. angiomyolipoma vs focal nodular hyperplasia (benign tumor). Pacemaker precludes MRI. At [facility], GI Recommend contrast-enhanced multiphase CT with liver protocol.         Patient was transferred to [facility] for diagnostic cardiac catheterization in  light of patients CAD history, symptoms and abnormal noninvasive test findings. In cath lab, patient was found to have 99% left main with 100% LAD, received intra-aortic balloon pump and was transferred to CCU post cath for further management. Patient will possibly need a liver biopsy prior to stenting and committing to dual antiplatelet therapy, a GI work up will be needed. Patient diuresed on lasix gtt, but wedge pressures were low, so were discontinued. Patient with RUTH on CKD, and due to concerns for further contrast loads, GI was okay with stenting without further imaging as they felt malignancy was unlikely. Patient had laser and stent to left main on 6/21.  Patient was transferred to floors and hydralazine and isordil were discontinued for soft BP. Diuretic was held 2/2 to elevated Scr and now downtrending. Patient currently stable and cleared for discharge. Patient is a 73 year old female with PMH significant for CAD with multiple stents (most recent in Feb 2019), systolic heart failure, EF 20% s/p ICD, HTN, HLD, DM, presented to [facility] on June 16th with abdominal pain. In ER, patient had abnormal EKG and elevated troponin with an RUTH. Patient was admitted to [facility] with NSTEMI and ACS protocol initiated with heparin gtt. Due to elevated creatinine, cath was not performed urgently. Patient also with an anemia. [facility] also performed a CT Abd/pelvis and patient was found to have 2 hepatic lesions in the lateral segment of the left hepatic lobe, will need further testing to determine hepatic carcinoma vs. angiomyolipoma vs focal nodular hyperplasia (benign tumor). Pacemaker precludes MRI. At [facility], GI Recommend contrast-enhanced multiphase CT with liver protocol.         Patient was transferred to [facility] for diagnostic cardiac catheterization in  light of patients CAD history, symptoms and abnormal noninvasive test findings. In cath lab, patient was found to have 99% left main with 100% LAD, received intra-aortic balloon pump and was transferred to CCU post cath for further management. Patient will possibly need a liver biopsy prior to stenting and committing to dual antiplatelet therapy, a GI work up will be needed. Patient diuresed on lasix gtt, but wedge pressures were low, so were discontinued. Patient with RUTH on CKD, and due to concerns for further contrast loads, GI was okay with stenting without further imaging as they felt malignancy was unlikely. Patient had laser and stent to left main on 6/21.  Patient was transferred to floors and hydralazine and isordil were discontinued for soft BP. Diuretic was held 2/2 to elevated Scr and now downtrending. Patient was also noted to have a A1C 15 and endocrine c/s was called. Patient was started on premeal insulin humalog 4units and dose was adjusted for her long acting insulin Lantus 30units QHS. Patient is currently stable for discharge d/w CCU team, endocrine and nephrology.

## 2019-06-22 NOTE — DISCHARGE NOTE PROVIDER - CARE PROVIDERS DIRECT ADDRESSES
[email or web address]

## 2019-06-22 NOTE — PROGRESS NOTE ADULT - PROBLEM SELECTOR PLAN 7
will continue to monitor BUN and creatinine  - avoid nephrotoxic agents, patient did receive contrast with cardiac cath  - likely overdiuresed given wedge of 3 and Cr bump. s/p 250 cc bolus with improvement of Cr

## 2019-06-22 NOTE — DISCHARGE NOTE PROVIDER - CARE PROVIDER_API CALL
Miller Rosen)  Adv Heart Fail Trns[facility] Cardio  [id number] [address] [place] [address]  Phone: [phone]  Fax: [phone]  Follow Up Time: 1 week    Reese Casas)  Internal Medicine  [address] [place] [address]  Phone: [phone]  Fax: [phone]  Follow Up Time:

## 2019-06-22 NOTE — PROGRESS NOTE ADULT - PROBLEM SELECTOR PROBLEM 9
Diabetes mellitus type 2 in nonobese

## 2019-06-22 NOTE — DISCHARGE NOTE PROVIDER - NSDCCPCAREPLAN_GEN_ALL_CORE_FT
PRINCIPAL DISCHARGE DIAGNOSIS  Diagnosis: NSTEMI (non-ST elevated myocardial infarction)  Assessment and Plan of Treatment: Continue aspirin and Brilinta, do not stop unless instructed by your physician.  Continue low salt, fat, cholesterol and carbohydrate diet. Follow up with cardiologist in 1 week      SECONDARY DISCHARGE DIAGNOSES  Diagnosis: Acute on chronic systolic heart failure  Assessment and Plan of Treatment: Low salt diet, fluid restriction to 1500 ml daily, monitor your fluid intake and weight daily, follow up with your cardiologist in 1 week       Diagnosis: Acute-on-chronic renal failure  Assessment and Plan of Treatment: follow up with Nephrologist in 1-2 weeks, to monitor your kidney function    Diagnosis: Liver lesion, left lobe  Assessment and Plan of Treatment: follow up with Hepatologist for further evaluation of liver lesion. PRINCIPAL DISCHARGE DIAGNOSIS  Diagnosis: NSTEMI (non-ST elevated myocardial infarction)  Assessment and Plan of Treatment: Continue aspirin and Brilinta, do not stop unless instructed by your physician.  Continue low salt, fat, cholesterol and carbohydrate diet. Follow up with cardiologist in 1 week      SECONDARY DISCHARGE DIAGNOSES  Diagnosis: Type II diabetes mellitus  Assessment and Plan of Treatment: you were found to have a A1C 15, premeal insulin was ordered for better control of your sugars. You are to take humalog 4 units 3 x a day before meals and lantus 30units at bedtime. Please follow up with Endocrinologist or PCP in 1-2 weeks, please check your fingersticks 4 x a day and keep a log of your fingersticks reading to provide to your doctor to help adjust your insulin. Adhere to a diabetic diet.    Diagnosis: Acute on chronic systolic heart failure  Assessment and Plan of Treatment: Low salt diet, fluid restriction to 1500 ml daily, monitor your fluid intake and weight daily, follow up with your cardiologist in 1 week       Diagnosis: Acute-on-chronic renal failure  Assessment and Plan of Treatment: follow up with Nephrologist in 1-2 weeks, to monitor your kidney function    Diagnosis: Liver lesion, left lobe  Assessment and Plan of Treatment: follow up with Hepatologist for further evaluation of liver lesion.

## 2019-06-22 NOTE — PROGRESS NOTE ADULT - SUBJECTIVE AND OBJECTIVE BOX
Patient is a 73y old  Female who presents with a chief complaint of NSTEMI, Post- cath transfer to CCU     SUBJECTIVE / OVERNIGHT EVENTS:    Pt went for staged PCI to left main with laser. Patient with massive hematoma of arm of radial site. No neurologic or vascular compromise overnight.     MEDICATIONS  (STANDING):  aspirin enteric coated 81 milliGRAM(s) Oral daily  atorvastatin 40 milliGRAM(s) Oral at bedtime  chlorhexidine 4% Liquid 1 Application(s) Topical <User Schedule>  docusate sodium 100 milliGRAM(s) Oral two times a day  heparin  Infusion 1000 Unit(s)/Hr (5 mL/Hr) IV Continuous <Continuous>  insulin glargine Injectable (LANTUS) 25 Unit(s) SubCutaneous at bedtime  insulin lispro (HumaLOG) corrective regimen sliding scale   SubCutaneous three times a day before meals  insulin lispro (HumaLOG) corrective regimen sliding scale   SubCutaneous at bedtime  isosorbide   dinitrate Tablet (ISORDIL) 10 milliGRAM(s) Oral three times a day  pantoprazole    Tablet 40 milliGRAM(s) Oral before breakfast  senna 2 Tablet(s) Oral at bedtime  sodium chloride 0.9% lock flush 3 milliLiter(s) IV Push every 8 hours  ticagrelor 90 milliGRAM(s) Oral every 12 hours    MEDICATIONS  (PRN):  acetaminophen   Tablet .. 650 milliGRAM(s) Oral every 6 hours PRN Mild Pain (1 - 3), Moderate Pain (4 - 6), Severe Pain (7 - 10)  dextrose 40% Gel 15 Gram(s) Oral once PRN Blood Glucose LESS THAN 70 milliGRAM(s)/deciliter  glucagon  Injectable 1 milliGRAM(s) IntraMuscular once PRN Glucose LESS THAN 70 milligrams/deciliter      Vital Signs Last 24 Hrs  T(C): 36.2 (22 Jun 2019 07:00), Max: 36.7 (21 Jun 2019 19:45)  T(F): 97.2 (22 Jun 2019 07:00), Max: 98.1 (21 Jun 2019 19:45)  HR: 83 (22 Jun 2019 07:00) (82 - 95)  BP: 134/58 (22 Jun 2019 04:20) (124/55 - 134/58)  BP(mean): 77 (22 Jun 2019 04:20) (72 - 77)  RR: 19 (22 Jun 2019 07:00) (13 - 23)  SpO2: 99% (22 Jun 2019 07:00) (93% - 100%)  CAPILLARY BLOOD GLUCOSE      POCT Blood Glucose.: 230 mg/dL (21 Jun 2019 21:26)  POCT Blood Glucose.: 286 mg/dL (21 Jun 2019 17:08)  POCT Blood Glucose.: 168 mg/dL (21 Jun 2019 11:59)  POCT Blood Glucose.: 180 mg/dL (21 Jun 2019 07:57)    I&O's Summary    21 Jun 2019 07:01  -  22 Jun 2019 07:00  --------------------------------------------------------  IN: 853 mL / OUT: 1450 mL / NET: -597 mL        PHYSICAL EXAM:  GENERAL: NAD   CHEST/LUNG: CTABL ; No wheeze  HEART: RRR; No murmurs  ABDOMEN: Soft, Nontender, Nondistended; Bowel sounds present  :  Female condom cath  EXTREMITIES:  R forearm/wrist hematoma  PSYCH: AAOx3  NEUROLOGY: no gross focal deficits    LABS:                        9.2    12.25 )-----------( 202      ( 22 Jun 2019 04:09 )             28.2     06-22    137  |  98  |  58<H>  ----------------------------<  180<H>  4.1   |  24  |  1.79<H>    Ca    8.5      22 Jun 2019 04:09  Phos  4.5     06-22  Mg     2.1     06-22    TPro  7.1  /  Alb  3.1<L>  /  TBili  0.3  /  DBili  x   /  AST  19  /  ALT  17  /  AlkPhos  110  06-22    PTT - ( 21 Jun 2019 11:50 )  PTT:85.3 SEC

## 2019-06-22 NOTE — PROGRESS NOTE ADULT - PROBLEM SELECTOR PROBLEM 7
RUTH (acute kidney injury)

## 2019-06-22 NOTE — DISCHARGE NOTE PROVIDER - NSFOLLOWUPCLINICS_GEN_ALL_ED_FT
[facility] Endocrinology  Endocrinology  [address] [place] [address]  Phone: [phone]  Fax:   Follow Up Time:

## 2019-06-22 NOTE — PROGRESS NOTE ADULT - ASSESSMENT
73 year old female with CKd stage 4, HTN, hyperlipidemia, DM-II, CHF (EF 20%) w/ ICD in left chest, and CAD with multiple PTCA/stents, most recent Feb 2019 who transfer from [facility] for NSTEMI s/p cath found to have 99% LM and 100% LAD received intra-aortic balloon pump and was transferred to CCU post cath for further management. also noted to have liver lesion likely need biopsy     CKD stage 4  baseline ~ 1.7-1.9  fluctuates slightly likely sec to CHF  CKD likely sec to DM/HTN/CHF  s/p cath 6/19  renal function slightly worsen today, likely fluctuating from CHF vs contrast given 6/19  continue to monitor bmp, monitor I/O  avoid nephrotoxic agents  monitor bmp    ** possible triple phase CT with contrast for liver lesion per hepatology, will need please give mucomyst 1200 bid for 4 doses start 1 day prior to test, last dose after test, sodium bicarb 150meq/L in sterile water at 200cc/hr x 1 hr 1hr prior to test then 75cc/hr x 6 hr after.    HTN  controlled  monitor BP    proteinuria  check urine p/c ratio  likely sec to DM  vasculitis work up ordered outpatient but never done.     NSTEMI  f/u cardio    CKD-MBD  check PTH, phos level  monitor phos and calcium daily

## 2019-06-22 NOTE — PROGRESS NOTE ADULT - ATTENDING COMMENTS
Violeta Maldonado is a 73 year old woman with HTN, hyperlipidemia, DM-II, CHF (EF 20%), s/p ICD  and CAD with multiple PTCA/stents (most recent Feb 2019). She presented to [facility] ED 6/16/19 complaining of abdominal pain x 1 week, found to have 2 hepatic lesion on CT non-contrast. Course was complicated by acute NSTEMI. She was transferred to [facility] for catheterization, which showed 95% distal left main and 100% pLAD stenoses. Prior RCA stents were patent. She underwent stent to distal LM and had IABP placed. She started nitro gtt &hep gtt. She was briefly on furosemide (Lasix) gtt.  Hepatic lesion was likely angiomyolipoma (not malignant). The patient underwent staged PCI to LAD on 6/21. Post procedure, there was arron radial artery hematoma, improved with compression, with normal radial pulse. On 6/22/19, with IABP RA 5; PCWP 7; CO 3.1; CI 1.9; MVO2 49 (decreased from 59). SBP augmenting to  – 105 mmHg. Diuretic discontinued and stable after short course IV fluid. Plan is to taper IABP on 6/23/19

## 2019-06-22 NOTE — PROGRESS NOTE ADULT - SUBJECTIVE AND OBJECTIVE BOX
VINICIO DEY  73y  Patient is a 73y old  Female who presents with a chief complaint of Chest pain (2019 09:49)    HPI:  Admitted for Chest pains, had Cardiac catheterization and staged stenting.  Developed RUTH, now improving.    HEALTH ISSUES - PROBLEM Dx:  Cardiogenic shock: Cardiogenic shock  Acute on chronic systolic heart failure: Acute on chronic systolic heart failure  Liver lesion, left lobe: Liver lesion, left lobe  VTE (venous thromboembolism): VTE (venous thromboembolism)  Chronic CHF: Chronic CHF  CAD (coronary artery disease): CAD (coronary artery disease)  Liver lesion: Liver lesion  Anemia: Anemia  Hyperlipidemia, unspecified hyperlipidemia type: Hyperlipidemia, unspecified hyperlipidemia type  Hypertension, unspecified type: Hypertension, unspecified type  RUTH (acute kidney injury): RUTH (acute kidney injury)  Diabetes mellitus type 2 in nonobese: Diabetes mellitus type 2 in nonobese  NSTEMI (non-ST elevated myocardial infarction): NSTEMI (non-ST elevated myocardial infarction)        MEDICATIONS  (STANDING):  aspirin enteric coated 81 milliGRAM(s) Oral daily  atorvastatin 40 milliGRAM(s) Oral at bedtime  chlorhexidine 4% Liquid 1 Application(s) Topical <User Schedule>  dextrose 5%. 1000 milliLiter(s) (50 mL/Hr) IV Continuous <Continuous>  dextrose 50% Injectable 12.5 Gram(s) IV Push once  dextrose 50% Injectable 25 Gram(s) IV Push once  docusate sodium 100 milliGRAM(s) Oral two times a day  heparin  Infusion 1000 Unit(s)/Hr (5 mL/Hr) IV Continuous <Continuous>  insulin glargine Injectable (LANTUS) 30 Unit(s) SubCutaneous at bedtime  insulin lispro (HumaLOG) corrective regimen sliding scale   SubCutaneous three times a day before meals  insulin lispro (HumaLOG) corrective regimen sliding scale   SubCutaneous at bedtime  isosorbide   dinitrate Tablet (ISORDIL) 10 milliGRAM(s) Oral three times a day  pantoprazole    Tablet 40 milliGRAM(s) Oral before breakfast  senna 2 Tablet(s) Oral at bedtime  sodium chloride 0.9% lock flush 3 milliLiter(s) IV Push every 8 hours  ticagrelor 90 milliGRAM(s) Oral every 12 hours    MEDICATIONS  (PRN):  acetaminophen   Tablet .. 650 milliGRAM(s) Oral every 6 hours PRN Mild Pain (1 - 3), Moderate Pain (4 - 6), Severe Pain (7 - 10)  dextrose 40% Gel 15 Gram(s) Oral once PRN Blood Glucose LESS THAN 70 milliGRAM(s)/deciliter  glucagon  Injectable 1 milliGRAM(s) IntraMuscular once PRN Glucose LESS THAN 70 milligrams/deciliter    Vital Signs Last 24 Hrs  T(C): 36.5 (2019 20:00), Max: 36.5 (2019 00:00)  T(F): 97.7 (2019 20:00), Max: 97.7 (2019 00:00)  HR: 88 (2019 20:00) (79 - 95)  BP: 134/58 (2019 04:20) (134/58 - 134/58)  BP(mean): 77 (2019 04:20) (77 - 77)  RR: 21 (2019 20:00) (13 - 24)  SpO2: 98% (2019 20:00) (93% - 100%)  Daily     Daily Weight in k.3 (2019 05:00)    PHYSICAL EXAM:  Constitutional:  She appears comfortable and not distressed. Not diaphoretic.    Neck:  The thyroid is normal. Trachea is midline.     Respiratory: The lungs are clear to auscultation. No dullness and expansion is normal.    Cardiovascular: S1 and S2 are normal. No mummurs, rubs or gallops are present.    Gastrointestinal: The abdomen is soft. No tenderness is present. No masses are present. Bowel sounds are normal.    Genitourinary: The bladder is not distended. No CVA tenderness is present.    Extremities: No edema is noted. No deformities are present.    Neurological: Cognition is normal. Tone, power and sensation are normal.     Skin: No lesions are seen  or palpated.                              9.2    12.25 )-----------( 202      ( 2019 04:09 )             28.2     06-22    137  |  98  |  58<H>  ----------------------------<  180<H>  4.1   |  24  |  1.79<H>    Ca    8.5      2019 04:09  Phos  4.5     06-22  Mg     2.1     06-22    TPro  7.1  /  Alb  3.1<L>  /  TBili  0.3  /  DBili  x   /  AST  19  /  ALT  17  /  AlkPhos  110

## 2019-06-23 LAB
ANION GAP SERPL CALC-SCNC: 14 MMO/L — SIGNIFICANT CHANGE UP (ref 7–14)
BASE EXCESS BLDV CALC-SCNC: 1.5 MMOL/L — SIGNIFICANT CHANGE UP
BASE EXCESS BLDV CALC-SCNC: 2.8 MMOL/L — SIGNIFICANT CHANGE UP
BASOPHILS # BLD AUTO: 0.05 K/UL — SIGNIFICANT CHANGE UP (ref 0–0.2)
BASOPHILS NFR BLD AUTO: 0.5 % — SIGNIFICANT CHANGE UP (ref 0–2)
BLOOD GAS VENOUS - CREATININE: 1.79 MG/DL — HIGH (ref 0.5–1.3)
BLOOD GAS VENOUS - CREATININE: SIGNIFICANT CHANGE UP MG/DL (ref 0.5–1.3)
BLOOD GAS VENOUS - FIO2: 2 — SIGNIFICANT CHANGE UP
BLOOD GAS VENOUS - FIO2: 21 — SIGNIFICANT CHANGE UP
BUN SERPL-MCNC: 54 MG/DL — HIGH (ref 7–23)
CALCIUM SERPL-MCNC: 8.4 MG/DL — SIGNIFICANT CHANGE UP (ref 8.4–10.5)
CHLORIDE BLDV-SCNC: 103 MMOL/L — SIGNIFICANT CHANGE UP (ref 96–108)
CHLORIDE BLDV-SCNC: 106 MMOL/L — SIGNIFICANT CHANGE UP (ref 96–108)
CHLORIDE SERPL-SCNC: 100 MMOL/L — SIGNIFICANT CHANGE UP (ref 98–107)
CO2 SERPL-SCNC: 24 MMOL/L — SIGNIFICANT CHANGE UP (ref 22–31)
CREAT SERPL-MCNC: 1.75 MG/DL — HIGH (ref 0.5–1.3)
EOSINOPHIL # BLD AUTO: 0.65 K/UL — HIGH (ref 0–0.5)
EOSINOPHIL NFR BLD AUTO: 6.4 % — HIGH (ref 0–6)
GAS PNL BLDV: 135 MMOL/L — LOW (ref 136–146)
GAS PNL BLDV: 136 MMOL/L — SIGNIFICANT CHANGE UP (ref 136–146)
GLUCOSE BLDC GLUCOMTR-MCNC: 195 MG/DL — HIGH (ref 70–99)
GLUCOSE BLDC GLUCOMTR-MCNC: 239 MG/DL — HIGH (ref 70–99)
GLUCOSE BLDC GLUCOMTR-MCNC: 275 MG/DL — HIGH (ref 70–99)
GLUCOSE BLDC GLUCOMTR-MCNC: 365 MG/DL — HIGH (ref 70–99)
GLUCOSE BLDV-MCNC: 223 MG/DL — HIGH (ref 70–99)
GLUCOSE BLDV-MCNC: 257 MG/DL — HIGH (ref 70–99)
GLUCOSE SERPL-MCNC: 237 MG/DL — HIGH (ref 70–99)
HCO3 BLDV-SCNC: 25 MMOL/L — SIGNIFICANT CHANGE UP (ref 20–27)
HCO3 BLDV-SCNC: 26 MMOL/L — SIGNIFICANT CHANGE UP (ref 20–27)
HCT VFR BLD CALC: 25.7 % — LOW (ref 34.5–45)
HCT VFR BLD CALC: 25.9 % — LOW (ref 34.5–45)
HCT VFR BLDV CALC: 22.1 % — LOW (ref 34.5–45)
HCT VFR BLDV CALC: 29.5 % — LOW (ref 34.5–45)
HGB BLD-MCNC: 8.2 G/DL — LOW (ref 11.5–15.5)
HGB BLD-MCNC: 8.3 G/DL — LOW (ref 11.5–15.5)
HGB BLDV-MCNC: 7.1 G/DL — LOW (ref 11.5–15.5)
HGB BLDV-MCNC: 9.5 G/DL — LOW (ref 11.5–15.5)
IMM GRANULOCYTES NFR BLD AUTO: 0.5 % — SIGNIFICANT CHANGE UP (ref 0–1.5)
LACTATE BLDV-MCNC: 1.4 MMOL/L — SIGNIFICANT CHANGE UP (ref 0.5–2)
LACTATE BLDV-MCNC: 1.5 MMOL/L — SIGNIFICANT CHANGE UP (ref 0.5–2)
LYMPHOCYTES # BLD AUTO: 1.86 K/UL — SIGNIFICANT CHANGE UP (ref 1–3.3)
LYMPHOCYTES # BLD AUTO: 18.3 % — SIGNIFICANT CHANGE UP (ref 13–44)
MAGNESIUM SERPL-MCNC: 2.1 MG/DL — SIGNIFICANT CHANGE UP (ref 1.6–2.6)
MCHC RBC-ENTMCNC: 26.2 PG — LOW (ref 27–34)
MCHC RBC-ENTMCNC: 26.3 PG — LOW (ref 27–34)
MCHC RBC-ENTMCNC: 31.9 % — LOW (ref 32–36)
MCHC RBC-ENTMCNC: 32 % — SIGNIFICANT CHANGE UP (ref 32–36)
MCV RBC AUTO: 82 FL — SIGNIFICANT CHANGE UP (ref 80–100)
MCV RBC AUTO: 82.1 FL — SIGNIFICANT CHANGE UP (ref 80–100)
MONOCYTES # BLD AUTO: 1.12 K/UL — HIGH (ref 0–0.9)
MONOCYTES NFR BLD AUTO: 11 % — SIGNIFICANT CHANGE UP (ref 2–14)
NEUTROPHILS # BLD AUTO: 6.45 K/UL — SIGNIFICANT CHANGE UP (ref 1.8–7.4)
NEUTROPHILS NFR BLD AUTO: 63.3 % — SIGNIFICANT CHANGE UP (ref 43–77)
NRBC # FLD: 0 K/UL — SIGNIFICANT CHANGE UP (ref 0–0)
NRBC # FLD: 0.03 K/UL — SIGNIFICANT CHANGE UP (ref 0–0)
PCO2 BLDV: 43 MMHG — SIGNIFICANT CHANGE UP (ref 41–51)
PCO2 BLDV: 44 MMHG — SIGNIFICANT CHANGE UP (ref 41–51)
PH BLDV: 7.4 PH — SIGNIFICANT CHANGE UP (ref 7.32–7.43)
PH BLDV: 7.41 PH — SIGNIFICANT CHANGE UP (ref 7.32–7.43)
PHOSPHATE SERPL-MCNC: 4.7 MG/DL — HIGH (ref 2.5–4.5)
PLATELET # BLD AUTO: 165 K/UL — SIGNIFICANT CHANGE UP (ref 150–400)
PLATELET # BLD AUTO: 178 K/UL — SIGNIFICANT CHANGE UP (ref 150–400)
PMV BLD: 11.1 FL — SIGNIFICANT CHANGE UP (ref 7–13)
PMV BLD: 11.9 FL — SIGNIFICANT CHANGE UP (ref 7–13)
PO2 BLDV: 36 MMHG — SIGNIFICANT CHANGE UP (ref 35–40)
PO2 BLDV: 44 MMHG — HIGH (ref 35–40)
POTASSIUM BLDV-SCNC: 3.8 MMOL/L — SIGNIFICANT CHANGE UP (ref 3.4–4.5)
POTASSIUM BLDV-SCNC: 4.1 MMOL/L — SIGNIFICANT CHANGE UP (ref 3.4–4.5)
POTASSIUM SERPL-MCNC: 4.1 MMOL/L — SIGNIFICANT CHANGE UP (ref 3.5–5.3)
POTASSIUM SERPL-SCNC: 4.1 MMOL/L — SIGNIFICANT CHANGE UP (ref 3.5–5.3)
RBC # BLD: 3.13 M/UL — LOW (ref 3.8–5.2)
RBC # BLD: 3.16 M/UL — LOW (ref 3.8–5.2)
RBC # FLD: 14.1 % — SIGNIFICANT CHANGE UP (ref 10.3–14.5)
RBC # FLD: 14.2 % — SIGNIFICANT CHANGE UP (ref 10.3–14.5)
SAO2 % BLDV: 63.6 % — SIGNIFICANT CHANGE UP (ref 60–85)
SAO2 % BLDV: 72.1 % — SIGNIFICANT CHANGE UP (ref 60–85)
SODIUM SERPL-SCNC: 138 MMOL/L — SIGNIFICANT CHANGE UP (ref 135–145)
WBC # BLD: 10.18 K/UL — SIGNIFICANT CHANGE UP (ref 3.8–10.5)
WBC # BLD: 10.26 K/UL — SIGNIFICANT CHANGE UP (ref 3.8–10.5)
WBC # FLD AUTO: 10.18 K/UL — SIGNIFICANT CHANGE UP (ref 3.8–10.5)
WBC # FLD AUTO: 10.26 K/UL — SIGNIFICANT CHANGE UP (ref 3.8–10.5)

## 2019-06-23 PROCEDURE — 71045 X-RAY EXAM CHEST 1 VIEW: CPT | Mod: 26

## 2019-06-23 PROCEDURE — 93931 UPPER EXTREMITY STUDY: CPT | Mod: 26,RT

## 2019-06-23 PROCEDURE — 99233 SBSQ HOSP IP/OBS HIGH 50: CPT | Mod: GC

## 2019-06-23 RX ORDER — INSULIN LISPRO 100/ML
3 VIAL (ML) SUBCUTANEOUS
Refills: 0 | Status: DISCONTINUED | OUTPATIENT
Start: 2019-06-23 | End: 2019-06-30

## 2019-06-23 RX ORDER — FENTANYL CITRATE 50 UG/ML
12.5 INJECTION INTRAVENOUS ONCE
Refills: 0 | Status: DISCONTINUED | OUTPATIENT
Start: 2019-06-23 | End: 2019-06-23

## 2019-06-23 RX ADMIN — Medication 100 MILLIGRAM(S): at 05:41

## 2019-06-23 RX ADMIN — ISOSORBIDE DINITRATE 10 MILLIGRAM(S): 5 TABLET ORAL at 21:38

## 2019-06-23 RX ADMIN — SODIUM CHLORIDE 3 MILLILITER(S): 9 INJECTION INTRAMUSCULAR; INTRAVENOUS; SUBCUTANEOUS at 12:34

## 2019-06-23 RX ADMIN — Medication 10: at 16:57

## 2019-06-23 RX ADMIN — Medication 2: at 21:38

## 2019-06-23 RX ADMIN — FENTANYL CITRATE 12.5 MICROGRAM(S): 50 INJECTION INTRAVENOUS at 13:15

## 2019-06-23 RX ADMIN — FENTANYL CITRATE 12.5 MICROGRAM(S): 50 INJECTION INTRAVENOUS at 13:30

## 2019-06-23 RX ADMIN — Medication 81 MILLIGRAM(S): at 12:06

## 2019-06-23 RX ADMIN — CHLORHEXIDINE GLUCONATE 1 APPLICATION(S): 213 SOLUTION TOPICAL at 12:03

## 2019-06-23 RX ADMIN — ISOSORBIDE DINITRATE 10 MILLIGRAM(S): 5 TABLET ORAL at 16:32

## 2019-06-23 RX ADMIN — TICAGRELOR 90 MILLIGRAM(S): 90 TABLET ORAL at 08:30

## 2019-06-23 RX ADMIN — Medication 4: at 08:30

## 2019-06-23 RX ADMIN — TICAGRELOR 90 MILLIGRAM(S): 90 TABLET ORAL at 21:38

## 2019-06-23 RX ADMIN — INSULIN GLARGINE 30 UNIT(S): 100 INJECTION, SOLUTION SUBCUTANEOUS at 21:38

## 2019-06-23 RX ADMIN — Medication 100 MILLIGRAM(S): at 18:52

## 2019-06-23 RX ADMIN — ISOSORBIDE DINITRATE 10 MILLIGRAM(S): 5 TABLET ORAL at 05:41

## 2019-06-23 RX ADMIN — Medication 3 UNIT(S): at 16:58

## 2019-06-23 RX ADMIN — SODIUM CHLORIDE 3 MILLILITER(S): 9 INJECTION INTRAMUSCULAR; INTRAVENOUS; SUBCUTANEOUS at 05:41

## 2019-06-23 RX ADMIN — PANTOPRAZOLE SODIUM 40 MILLIGRAM(S): 20 TABLET, DELAYED RELEASE ORAL at 05:41

## 2019-06-23 RX ADMIN — ATORVASTATIN CALCIUM 40 MILLIGRAM(S): 80 TABLET, FILM COATED ORAL at 21:38

## 2019-06-23 RX ADMIN — SODIUM CHLORIDE 3 MILLILITER(S): 9 INJECTION INTRAMUSCULAR; INTRAVENOUS; SUBCUTANEOUS at 21:39

## 2019-06-23 NOTE — PROGRESS NOTE ADULT - SUBJECTIVE AND OBJECTIVE BOX
Patient is a 73y old  Female who presents with a chief complaint of NSTEMI, Post- cath transfer to CCU    SUBJ:-right radial hematoma, improving-no chest pain       Vital Signs Last 24 Hrs  T(C): 36.5 (22 Jun 2019 20:00), Max: 36.5 (22 Jun 2019 20:00)  T(F): 97.7 (22 Jun 2019 20:00), Max: 97.7 (22 Jun 2019 20:00)  HR: 88 (23 Jun 2019 08:00) (79 - 94)  BP:99/61 mmHg  RR: 20 (23 Jun 2019 08:00) (13 - 24)  SpO2: 97% (23 Jun 2019 08:00) (96% - 100%)  I&O's Summary    22 Jun 2019 07:01  -  23 Jun 2019 07:00  --------------------------------------------------------  IN: 460 mL / OUT: 1100 mL / NET: -640 mL        MEDICATIONS:  aspirin enteric coated 81 milliGRAM(s) Oral daily  heparin  Infusion 1000 Unit(s)/Hr IV Continuous <Continuous>  isosorbide   dinitrate Tablet (ISORDIL) 10 milliGRAM(s) Oral three times a day  ticagrelor 90 milliGRAM(s) Oral every 12 hours  acetaminophen   Tablet .. 650 milliGRAM(s) Oral every 6 hours PRN  docusate sodium 100 milliGRAM(s) Oral two times a day  pantoprazole    Tablet 40 milliGRAM(s) Oral before breakfast  senna 2 Tablet(s) Oral at bedtime    atorvastatin 40 milliGRAM(s) Oral at bedtime  dextrose 40% Gel 15 Gram(s) Oral once PRN  dextrose 50% Injectable 12.5 Gram(s) IV Push once  dextrose 50% Injectable 25 Gram(s) IV Push once  glucagon  Injectable 1 milliGRAM(s) IntraMuscular once PRN  insulin glargine Injectable (LANTUS) 30 Unit(s) SubCutaneous at bedtime  insulin lispro (HumaLOG) corrective regimen sliding scale   SubCutaneous three times a day before meals  insulin lispro (HumaLOG) corrective regimen sliding scale   SubCutaneous at bedtime    chlorhexidine 4% Liquid 1 Application(s) Topical <User Schedule>  dextrose 5%. 1000 milliLiter(s) IV Continuous <Continuous>  sodium chloride 0.9% lock flush 3 milliLiter(s) IV Push every 8 hours      REVIEW OF SYSTEMS:    CONSTITUTIONAL: No weakness, fevers or chills  EYES/ENT: No visual changes;  No vertigo or throat pain   NECK: No pain or stiffness  RESPIRATORY: No cough, wheezing, hemoptysis; No shortness of breath  CARDIOVASCULAR: No chest pain or palpitations  GASTROINTESTINAL: No abdominal or epigastric pain. No nausea, vomiting, or hematemesis; No diarrhea or constipation. No melena or hematochezia.  GENITOURINARY: No dysuria, frequency or hematuria  NEUROLOGICAL: No numbness or weakness  SKIN: No itching, rashes  EXTREMITIES: right arm tender and swollen    PHYSICAL EXAM:    GENERAL: NAD, well-developed  HEAD:  Atraumatic, Normocephalic  EYES: EOMI, PERRLA, conjunctiva and sclera clear  NECK: Supple, No JVD  CHEST/LUNG: Clear to auscultation bilaterally; No rhonchis, rales, or wheezing  HEART: Regular rate and rhythm; S1, S2; No murmurs, rubs, or gallops  ABDOMEN: Soft, Nontender, Nondistended; Normoactive bowel sounds  EXTREMITIES:  right radial cath site with hematoma, tender to palpation, +radial pulse  PSYCH: A&Ox3  NEUROLOGY: non-focal  SKIN: No rashes or lesionsWBC Count : 10.18 K/uL    LABS:  Hemoglobin : 8.2 g/dL  Hematocrit : 25.7 %  Platelet Count - Automated : 178 K/uL    06-23    138  |  100  |  54<H>  ----------------------------<  237<H>  4.1   |  24  |  1.75<H>  06-22    137  |  98  |  58<H>  ----------------------------<  180<H>  4.1   |  24  |  1.79<H>    Ca    8.4      23 Jun 2019 05:22  Ca    8.5      22 Jun 2019 04:09  Phos  4.7     06-23  Phos  4.5     06-22  Mg     2.1     06-23  Mg     2.1     06-22    TPro  7.1  /  Alb  3.1<L>  /  TBili  0.3  /  DBili  x   /  AST  19  /  ALT  17  /  AlkPhos  110  06-22

## 2019-06-23 NOTE — PROGRESS NOTE ADULT - ASSESSMENT
73 year old female with CKd stage 4, HTN, hyperlipidemia, DM-II, CHF (EF 20%) w/ ICD in left chest, and CAD with multiple PTCA/stents, most recent Feb 2019 who transfer from [facility] for NSTEMI s/p cath found to have 99% LM and 100% LAD received intra-aortic balloon pump and was transferred to CCU post cath for further management. also noted to have liver lesion likely need biopsy     CKD stage 4  baseline ~ 1.7-1.9  fluctuates slightly likely sec to CHF  CKD likely sec to DM/HTN/CHF  s/p cath 6/19  renal function slightly worsen today, likely fluctuating from CHF vs contrast given 6/19  continue to monitor bmp, monitor I/O  avoid nephrotoxic agents  monitor bmp    ** possible triple phase CT with contrast for liver lesion per hepatology, will need please give mucomyst 1200 bid for 4 doses start 1 day prior to test, last dose after test, sodium bicarb 150meq/L in sterile water at 200cc/hr x 1 hr 1hr prior to test then 75cc/hr x 6 hr after.    HTN  controlled  monitor BP    proteinuria  check urine p/c ratio  likely sec to DM  vasculitis work up ordered outpatient but never done.     NSTEMI  f/u cardio    CKD-MBD  check PTH, phos level  monitor phos and calcium daily 73 year old female with CKd stage 4, HTN, hyperlipidemia, DM-II, CHF (EF 20%) w/ ICD in left chest, and CAD with multiple PTCA/stents, most recent Feb 2019 who transfer from [facility] for NSTEMI s/p cath found to have 99% LM and 100% LAD received intra-aortic balloon pump and was transferred to CCU post cath for further management. also noted to have liver lesion likely need biopsy     3 year old female with CKd stage 4, HTN, hyperlipidemia, DM-II, CHF (EF 20%) w/ ICD in left chest, and CAD with multiple PTCA/stents, most recent Feb 2019 who transfer from [facility] for NSTEMI s/p cath found to have 99% LM and 100% LAD received intra-aortic balloon pump and was transferred to CCU post cath for further management. also noted to have liver lesion likely need biopsy     Problem/Recommendation - 1:  Problem: NSTEMI (non-ST elevated myocardial infarction).   s/p stent to the left main via right radial artery  right radial artery site with hematoma and tender to touch. Will continue to closely monitor, Dopplers are pending  -continue with DAPT with asa and brilinta and lipitor 40  -patient heparin discontinued and balloon pump is scheduled to be removed today  Problem/Recommendation - 2:  ·  Problem: CAD (coronary artery disease).    -patient % is chronic, patient will not be going for staging to the LAD, spoke with Dr. Lima  -continue isordil 10 tid, will consider BB late today if patient tolerates balloon pump removal  Problem/Recommendation - 3:  Problem: Liver lesion.    -hepatology consulted, no further work up at this time   -the lesion is likely an angiomyolipoma, and not a malignant lesion.  Problem/Recommendation - 4:  ·  Problem: Hypertension  -BP stable at this time, will consider adding BB later tonight once balloon pump is removed. Continue isordil 10 tid  Problem/Recommendation - 5:  ·  Problem: Hyperlipidemia  -lipitor 40     Problem/Recommendation - 6:  Problem: Chronic CHF.   -patient is s/p revascularization, will initiate BB later today, no need for diuretic at this time  Problem/Recommendation - 7:  Problem: RUTH (acute kidney injury). Recommendation: will continue to monitor BUN and creatinine  -avoid nephrotoxic agents, patient did receive contrast with cardiac cath.    Problem/Recommendation - 8:  Problem: Anemia. Recommendation: will continue to monitor CBC.    Problem/Recommendation - 9:  Problem: Diabetes mellitus type 2 in nonobese. Recommendation: will continue lantus   25 units qhs, (home dose is 35units) and fingersticks QAC and QHS, as well as ISS.    Problem/Recommendation - 10:  Problem: VTE (venous thromboembolism). Recommendation: she will be on a heparin gtt for balloon pump 1/:, this will also be sufficient for VTE prophylaxis.      Electronic Signatures:  Myah Hartmann (NP)   (Signed 19-Jun-2019 18:46)

## 2019-06-23 NOTE — PROGRESS NOTE ADULT - ASSESSMENT
73 year old female with CKd stage 4, HTN, hyperlipidemia, DM-II, CHF (EF 20%) w/ ICD in left chest, and CAD with multiple PTCA/stents, most recent Feb 2019 who transfer from [facility] for NSTEMI s/p cath found to have 99% LM and 100% LAD received intra-aortic balloon pump and was transferred to CCU post cath for further management. also noted to have liver lesion likely need biopsy     CKD stage 4  baseline ~ 1.7-1.9, now at baseline. She remains non-Oliguric.  CKD likely sec to DM/HTN/CHF  Continue to monitor bmp, monitor I/O  avoid nephrotoxic agents  monitor bmp    ** possible triple phase CT with contrast for liver lesion per hepatology, will need please give mucomyst 1200 bid for 4 doses start 1 day prior to test, last dose after test, sodium bicarb 150meq/L in sterile water at 200cc/hr x 1 hr 1hr prior to test then 75cc/hr x 6 hr after.    HTN  controlled  monitor BP    proteinuria  check urine p/c ratio  likely sec to DM  vasculitis work up ordered outpatient but never done.     NSTEMI  f/u cardio    CKD-MBD  check PTH, phos level  monitor phos and calcium daily

## 2019-06-23 NOTE — PROGRESS NOTE ADULT - ASSESSMENT
73 year old female with CKd stage 4, HTN, hyperlipidemia, DM-II, CHF (EF 20%) w/ ICD in left chest, and CAD with multiple PTCA/stents, most recent Feb 2019 who transfer from [facility] for NSTEMI s/p cath found to have 99% LM and 100% LAD received intra-aortic balloon pump and was transferred to CCU post cath for further management. also noted to have liver lesion likely need biopsy         Problem/Recommendation - 1:  Problem: NSTEMI (non-ST elevated myocardial infarction).   s/p stent to the left main via right radial artery  right radial artery site with hematoma and tender to touch. Will continue to closely monitor, Dopplers are pending  -continue with DAPT with asa and brilinta and lipitor 40  -patient heparin discontinued and balloon pump is scheduled to be removed today      Problem/Recommendation - 2:  ·  Problem: CAD (coronary artery disease).    -patient % is chronic, patient will not be going for staging to the LAD, spoke with Dr. Lima  -continue isordil 10 tid, will consider BB late today if patient tolerates balloon pump removal      Problem/Recommendation - 3:  Problem: Liver lesion.    -hepatology consulted, no further work up at this time   -the lesion is likely an angiomyolipoma, and not a malignant lesion.      Problem/Recommendation - 4:  ·  Problem: Hypertension  -BP stable at this time, will consider adding BB later tonight once balloon pump is removed. Continue isordil 10 tid      Problem/Recommendation - 5:  ·  Problem: Hyperlipidemia  -lipitor 40     Problem/Recommendation - 6:  Problem: Chronic CHF.   -patient is s/p revascularization, will initiate BB later today, no need for diuretic at this time  Problem/Recommendation - 7:  Problem: RUTH (acute kidney injury). Recommendation: will continue to monitor BUN and creatinine  -avoid nephrotoxic agents, patient did receive contrast with cardiac cath.    Problem/Recommendation - 8:  Problem: Anemia. Recommendation: will continue to monitor CBC.    Problem/Recommendation - 9:  Problem: Diabetes mellitus type 2 in nonobese. Recommendation: will continue lantus   25 units qhs, (home dose is 35units) and fingersticks QAC and QHS, as well as ISS.    Problem/Recommendation - 10:  Problem: VTE (venous thromboembolism). Recommendation: she will be on a heparin gtt for balloon pump 1/:, this will also be sufficient for VTE prophylaxis.

## 2019-06-23 NOTE — PROGRESS NOTE ADULT - SUBJECTIVE AND OBJECTIVE BOX
VINICIO DEY  73y  Patient is a 73y old  Female who presents with a chief complaint of chest pain (2019 08:31)    HPI:  This is a patient with CKD who was admitted for chest pains. S/p Stenting. She feels better today.    HEALTH ISSUES - PROBLEM Dx:  Cardiogenic shock: Cardiogenic shock  Acute on chronic systolic heart failure: Acute on chronic systolic heart failure  Liver lesion, left lobe: Liver lesion, left lobe  VTE (venous thromboembolism): VTE (venous thromboembolism)  Chronic CHF: Chronic CHF  CAD (coronary artery disease): CAD (coronary artery disease)  Liver lesion: Liver lesion  Anemia: Anemia  Hyperlipidemia, unspecified hyperlipidemia type: Hyperlipidemia, unspecified hyperlipidemia type  Hypertension, unspecified type: Hypertension, unspecified type  RUTH (acute kidney injury): RUTH (acute kidney injury)  Diabetes mellitus type 2 in nonobese: Diabetes mellitus type 2 in nonobese  NSTEMI (non-ST elevated myocardial infarction): NSTEMI (non-ST elevated myocardial infarction)        MEDICATIONS  (STANDING):  aspirin enteric coated 81 milliGRAM(s) Oral daily  atorvastatin 40 milliGRAM(s) Oral at bedtime  chlorhexidine 4% Liquid 1 Application(s) Topical <User Schedule>  dextrose 5%. 1000 milliLiter(s) (50 mL/Hr) IV Continuous <Continuous>  dextrose 50% Injectable 12.5 Gram(s) IV Push once  dextrose 50% Injectable 25 Gram(s) IV Push once  docusate sodium 100 milliGRAM(s) Oral two times a day  fentaNYL    Injectable 12.5 MICROGram(s) IV Push once  insulin glargine Injectable (LANTUS) 30 Unit(s) SubCutaneous at bedtime  insulin lispro (HumaLOG) corrective regimen sliding scale   SubCutaneous three times a day before meals  insulin lispro (HumaLOG) corrective regimen sliding scale   SubCutaneous at bedtime  insulin lispro Injectable (HumaLOG) 3 Unit(s) SubCutaneous three times a day before meals  isosorbide   dinitrate Tablet (ISORDIL) 10 milliGRAM(s) Oral three times a day  pantoprazole    Tablet 40 milliGRAM(s) Oral before breakfast  senna 2 Tablet(s) Oral at bedtime  sodium chloride 0.9% lock flush 3 milliLiter(s) IV Push every 8 hours  ticagrelor 90 milliGRAM(s) Oral every 12 hours    MEDICATIONS  (PRN):  acetaminophen   Tablet .. 650 milliGRAM(s) Oral every 6 hours PRN Mild Pain (1 - 3), Moderate Pain (4 - 6), Severe Pain (7 - 10)  dextrose 40% Gel 15 Gram(s) Oral once PRN Blood Glucose LESS THAN 70 milliGRAM(s)/deciliter  glucagon  Injectable 1 milliGRAM(s) IntraMuscular once PRN Glucose LESS THAN 70 milligrams/deciliter    Vital Signs Last 24 Hrs  T(C): 37 (2019 08:00), Max: 37 (2019 08:00)  T(F): 98.6 (2019 08:00), Max: 98.6 (2019 08:00)  HR: 88 (2019 13:30) (81 - 94)  BP: 119/51 (2019 13:30) (85/50 - 133/76)  BP(mean): 67 (2019 13:30) (56 - 90)  RR: 16 (2019 13:30) (9 - 24)  SpO2: 99% (2019 13:30) (97% - 100%)  Daily     Daily Weight in k.6 (2019 06:00)    PHYSICAL EXAM:  Constitutional: She appears comfortable and not distressed. Not diaphoretic.    Neck:  The thyroid is normal. Trachea is midline.     Respiratory: The lungs are clear to auscultation. No dullness and expansion is normal.    Cardiovascular: S1 and S2 are normal. No mummurs, rubs or gallops are present.    Gastrointestinal: The abdomen is soft. No tenderness is present. No masses are present.     Genitourinary: The bladder is not distended. No CVA tenderness is present.    Extremities: No edema is noted. No deformities are present.    Neurological: Cognition is normal. Tone, power and sensation are normal.     Skin: No lesions are seen  or palpated.                              8.3    10.26 )-----------( 165      ( 2019 12:10 )             25.9     06-    138  |  100  |  54<H>  ----------------------------<  237<H>  4.1   |  24  |  1.75<H>    Ca    8.4      2019 05:22  Phos  4.7     06-  Mg     2.1         TPro  7.1  /  Alb  3.1<L>  /  TBili  0.3  /  DBili  x   /  AST  19  /  ALT  17  /  AlkPhos  110

## 2019-06-23 NOTE — PROGRESS NOTE ADULT - NSHPATTENDINGPLANDISCUSS_GEN_ALL_CORE
Dr. Jas Ruiz, Dr. Ricki Ervin and Myah Hartmann, NP
Dr. Aristeo Diana, Dr. Ricki Bella and Haim Bentley, NP

## 2019-06-23 NOTE — PROGRESS NOTE ADULT - SUBJECTIVE AND OBJECTIVE BOX
Date of Admission:  6/19/19  24 hour events:  right radial hematoma, improving  Vital Signs Last 24 Hrs  T(C): 36.5 (22 Jun 2019 20:00), Max: 36.5 (22 Jun 2019 20:00)  T(F): 97.7 (22 Jun 2019 20:00), Max: 97.7 (22 Jun 2019 20:00)  HR: 88 (23 Jun 2019 08:00) (79 - 94)  BP: --  BP(mean): --  RR: 20 (23 Jun 2019 08:00) (13 - 24)  SpO2: 97% (23 Jun 2019 08:00) (96% - 100%)  I&O's Summary    22 Jun 2019 07:01  -  23 Jun 2019 07:00  --------------------------------------------------------  IN: 460 mL / OUT: 1100 mL / NET: -640 mL        MEDICATIONS:  aspirin enteric coated 81 milliGRAM(s) Oral daily  heparin  Infusion 1000 Unit(s)/Hr IV Continuous <Continuous>  isosorbide   dinitrate Tablet (ISORDIL) 10 milliGRAM(s) Oral three times a day  ticagrelor 90 milliGRAM(s) Oral every 12 hours        acetaminophen   Tablet .. 650 milliGRAM(s) Oral every 6 hours PRN    docusate sodium 100 milliGRAM(s) Oral two times a day  pantoprazole    Tablet 40 milliGRAM(s) Oral before breakfast  senna 2 Tablet(s) Oral at bedtime    atorvastatin 40 milliGRAM(s) Oral at bedtime  dextrose 40% Gel 15 Gram(s) Oral once PRN  dextrose 50% Injectable 12.5 Gram(s) IV Push once  dextrose 50% Injectable 25 Gram(s) IV Push once  glucagon  Injectable 1 milliGRAM(s) IntraMuscular once PRN  insulin glargine Injectable (LANTUS) 30 Unit(s) SubCutaneous at bedtime  insulin lispro (HumaLOG) corrective regimen sliding scale   SubCutaneous three times a day before meals  insulin lispro (HumaLOG) corrective regimen sliding scale   SubCutaneous at bedtime    chlorhexidine 4% Liquid 1 Application(s) Topical <User Schedule>  dextrose 5%. 1000 milliLiter(s) IV Continuous <Continuous>  sodium chloride 0.9% lock flush 3 milliLiter(s) IV Push every 8 hours      REVIEW OF SYSTEMS:  Complete 10point ROS negative.    PHYSICAL EXAM:  General: NAD  Cardiovascular: Normal S1 S2, No JVD, No murmurs, No edema  Respiratory: Lungs clear to auscultation	  Gastrointestinal:  Soft, Non-tender, + BS	  Skin: warm and dry, No rashes, No ecchymoses, No cyanosis	  Extremities: Normal range of motion, No clubbing, cyanosis or edema  Vascular: Peripheral pulses palpable 2+ bilaterally    LABS:	 	    CBC Full  -  ( 23 Jun 2019 05:22 )  WBC Count : 10.18 K/uL  Hemoglobin : 8.2 g/dL  Hematocrit : 25.7 %  Platelet Count - Automated : 178 K/uL  Mean Cell Volume : 82.1 fL  Mean Cell Hemoglobin : 26.2 pg  Mean Cell Hemoglobin Concentration : 31.9 %  Auto Neutrophil # : 6.45 K/uL  Auto Lymphocyte # : 1.86 K/uL  Auto Monocyte # : 1.12 K/uL  Auto Eosinophil # : 0.65 K/uL  Auto Basophil # : 0.05 K/uL  Auto Neutrophil % : 63.3 %  Auto Lymphocyte % : 18.3 %  Auto Monocyte % : 11.0 %  Auto Eosinophil % : 6.4 %  Auto Basophil % : 0.5 %    06-23    138  |  100  |  54<H>  ----------------------------<  237<H>  4.1   |  24  |  1.75<H>  06-22    137  |  98  |  58<H>  ----------------------------<  180<H>  4.1   |  24  |  1.79<H>    Ca    8.4      23 Jun 2019 05:22  Ca    8.5      22 Jun 2019 04:09  Phos  4.7     06-23  Phos  4.5     06-22  Mg     2.1     06-23  Mg     2.1     06-22    TPro  7.1  /  Alb  3.1<L>  /  TBili  0.3  /  DBili  x   /  AST  19  /  ALT  17  /  AlkPhos  110  06-22      proBNP:   Lipid Profile:   HgA1c:   TSH:       CARDIAC MARKERS:            TELEMETRY: 	    ECG:  	  RADIOLOGY:  ECHO:  OTHER: 	    PREVIOUS DIAGNOSTIC TESTING:    [ ] Echocardiogram:  [ ]  Catheterization:  [ ] Stress Test: Date of Admission:  6/19/19  24 hour events:  right radial hematoma, improving  Vital Signs Last 24 Hrs  T(C): 36.5 (22 Jun 2019 20:00), Max: 36.5 (22 Jun 2019 20:00)  T(F): 97.7 (22 Jun 2019 20:00), Max: 97.7 (22 Jun 2019 20:00)  HR: 88 (23 Jun 2019 08:00) (79 - 94)  BP: --  BP(mean): --  RR: 20 (23 Jun 2019 08:00) (13 - 24)  SpO2: 97% (23 Jun 2019 08:00) (96% - 100%)  I&O's Summary    22 Jun 2019 07:01  -  23 Jun 2019 07:00  --------------------------------------------------------  IN: 460 mL / OUT: 1100 mL / NET: -640 mL        MEDICATIONS:  aspirin enteric coated 81 milliGRAM(s) Oral daily  heparin  Infusion 1000 Unit(s)/Hr IV Continuous <Continuous>  isosorbide   dinitrate Tablet (ISORDIL) 10 milliGRAM(s) Oral three times a day  ticagrelor 90 milliGRAM(s) Oral every 12 hours  acetaminophen   Tablet .. 650 milliGRAM(s) Oral every 6 hours PRN  docusate sodium 100 milliGRAM(s) Oral two times a day  pantoprazole    Tablet 40 milliGRAM(s) Oral before breakfast  senna 2 Tablet(s) Oral at bedtime    atorvastatin 40 milliGRAM(s) Oral at bedtime  dextrose 40% Gel 15 Gram(s) Oral once PRN  dextrose 50% Injectable 12.5 Gram(s) IV Push once  dextrose 50% Injectable 25 Gram(s) IV Push once  glucagon  Injectable 1 milliGRAM(s) IntraMuscular once PRN  insulin glargine Injectable (LANTUS) 30 Unit(s) SubCutaneous at bedtime  insulin lispro (HumaLOG) corrective regimen sliding scale   SubCutaneous three times a day before meals  insulin lispro (HumaLOG) corrective regimen sliding scale   SubCutaneous at bedtime    chlorhexidine 4% Liquid 1 Application(s) Topical <User Schedule>  dextrose 5%. 1000 milliLiter(s) IV Continuous <Continuous>  sodium chloride 0.9% lock flush 3 milliLiter(s) IV Push every 8 hours      REVIEW OF SYSTEMS:    CONSTITUTIONAL: No weakness, fevers or chills  EYES/ENT: No visual changes;  No vertigo or throat pain   NECK: No pain or stiffness  RESPIRATORY: No cough, wheezing, hemoptysis; No shortness of breath  CARDIOVASCULAR: No chest pain or palpitations  GASTROINTESTINAL: No abdominal or epigastric pain. No nausea, vomiting, or hematemesis; No diarrhea or constipation. No melena or hematochezia.  GENITOURINARY: No dysuria, frequency or hematuria  NEUROLOGICAL: No numbness or weakness  SKIN: No itching, rashes  EXTREMITIES: right arm tender and swollen    PHYSICAL EXAM:    GENERAL: NAD, well-developed  HEAD:  Atraumatic, Normocephalic  EYES: EOMI, PERRLA, conjunctiva and sclera clear  NECK: Supple, No JVD  CHEST/LUNG: Clear to auscultation bilaterally; No rhonchis, rales, or wheezing  HEART: Regular rate and rhythm; S1, S2; No murmurs, rubs, or gallops  ABDOMEN: Soft, Nontender, Nondistended; Normoactive bowel sounds  EXTREMITIES:  right radial cath site with hematoma, tender to palpation, +radial pulse  PSYCH: A&Ox3  NEUROLOGY: non-focal  SKIN: No rashes or lesionsWBC Count : 10.18 K/uL    LABS:  Hemoglobin : 8.2 g/dL  Hematocrit : 25.7 %  Platelet Count - Automated : 178 K/uL  Mean Cell Volume : 82.1 fL  Mean Cell Hemoglobin : 26.2 pg  Mean Cell Hemoglobin Concentration : 31.9 %  Auto Neutrophil # : 6.45 K/uL  Auto Lymphocyte # : 1.86 K/uL  Auto Monocyte # : 1.12 K/uL  Auto Eosinophil # : 0.65 K/uL  Auto Basophil # : 0.05 K/uL  Auto Neutrophil % : 63.3 %  Auto Lymphocyte % : 18.3 %  Auto Monocyte % : 11.0 %  Auto Eosinophil % : 6.4 %  Auto Basophil % : 0.5 %    06-23    138  |  100  |  54<H>  ----------------------------<  237<H>  4.1   |  24  |  1.75<H>  06-22    137  |  98  |  58<H>  ----------------------------<  180<H>  4.1   |  24  |  1.79<H>    Ca    8.4      23 Jun 2019 05:22  Ca    8.5      22 Jun 2019 04:09  Phos  4.7     06-23  Phos  4.5     06-22  Mg     2.1     06-23  Mg     2.1     06-22    TPro  7.1  /  Alb  3.1<L>  /  TBili  0.3  /  DBili  x   /  AST  19  /  ALT  17  /  AlkPhos  110  06-22

## 2019-06-23 NOTE — PROGRESS NOTE ADULT - ATTENDING COMMENTS
Violeta Maldonado is a 73 year old woman with HTN, hyperlipidemia, DM-II, CHF (EF 20%), s/p ICD  and CAD with multiple PTCA/stents (most recent Feb 2019). She presented to [facility] ED 6/16/19 complaining of abdominal pain x 1 week, found to have 2 hepatic lesion on CT non-contrast. Course was complicated by acute NSTEMI. She was transferred to [facility] for catheterization, which showed 95% distal left main and 100% pLAD stenoses. Prior RCA stents were patent. She underwent stent to distal LM and had IABP placed. She started nitro gtt &hep gtt. She was briefly on furosemide (Lasix) gtt.  Hepatic lesion was likely angiomyolipoma (not malignant). The patient underwent staged PCI to LAD on 6/21. Post procedure, there was arron radial artery hematoma, improved with compression, with normal radial pulse. On 6/22/19, with IABP RA 5; PCWP 7; CO 3.1; CI 1.9; MVO2 49 (decreased from 59). SBP augmenting to  – 105 mmHg. Diuretic discontinued and stable after short course IV fluid. On 6/23/19, RA 4; PA 26/12 mm Hg; CO 5.5; CI 3.3.  Hemodynamically stable after decreasing IABP from 1:1 to 2:1 counterpulsations. Plan for 6/23/19 to discontinue IABP (as discussed with Dr. Lima of interventional cardiology). Decrease in hemoglobin and hematocrit was noted on 6/23/19. Site of radial percutaneous entry appeared stable, with good pulse and no expansion of hematoma. US was requested to confirm stability

## 2019-06-23 NOTE — CHART NOTE - NSCHARTNOTEFT_GEN_A_CORE
IABP removed. 30 minutes manual compression held. Minimal blood loss. Pt was mildly hypotensive at start of procedure. Received 250 cc NS bolus and hypotension resolved. Groin is now soft with no evidence of hematoma.    Jas Ruiz MD  Cardiology Fellow  [phone]  All Cardiology service information can be found 24/7 on [email or web address], password: [email or web address]

## 2019-06-24 LAB
ALBUMIN SERPL ELPH-MCNC: 3.2 G/DL — LOW (ref 3.3–5)
ALP SERPL-CCNC: 107 U/L — SIGNIFICANT CHANGE UP (ref 40–120)
ALT FLD-CCNC: 22 U/L — SIGNIFICANT CHANGE UP (ref 4–33)
ANION GAP SERPL CALC-SCNC: 15 MMO/L — HIGH (ref 7–14)
AST SERPL-CCNC: 37 U/L — HIGH (ref 4–32)
BASOPHILS # BLD AUTO: 0.04 K/UL — SIGNIFICANT CHANGE UP (ref 0–0.2)
BASOPHILS NFR BLD AUTO: 0.4 % — SIGNIFICANT CHANGE UP (ref 0–2)
BILIRUB SERPL-MCNC: 0.3 MG/DL — SIGNIFICANT CHANGE UP (ref 0.2–1.2)
BUN SERPL-MCNC: 52 MG/DL — HIGH (ref 7–23)
CALCIUM SERPL-MCNC: 8.7 MG/DL — SIGNIFICANT CHANGE UP (ref 8.4–10.5)
CHLORIDE SERPL-SCNC: 102 MMOL/L — SIGNIFICANT CHANGE UP (ref 98–107)
CO2 SERPL-SCNC: 23 MMOL/L — SIGNIFICANT CHANGE UP (ref 22–31)
CREAT SERPL-MCNC: 1.88 MG/DL — HIGH (ref 0.5–1.3)
EOSINOPHIL # BLD AUTO: 0.45 K/UL — SIGNIFICANT CHANGE UP (ref 0–0.5)
EOSINOPHIL NFR BLD AUTO: 4.6 % — SIGNIFICANT CHANGE UP (ref 0–6)
GLUCOSE BLDC GLUCOMTR-MCNC: 156 MG/DL — HIGH (ref 70–99)
GLUCOSE BLDC GLUCOMTR-MCNC: 221 MG/DL — HIGH (ref 70–99)
GLUCOSE BLDC GLUCOMTR-MCNC: 282 MG/DL — HIGH (ref 70–99)
GLUCOSE BLDC GLUCOMTR-MCNC: 292 MG/DL — HIGH (ref 70–99)
GLUCOSE SERPL-MCNC: 155 MG/DL — HIGH (ref 70–99)
HCT VFR BLD CALC: 26.6 % — LOW (ref 34.5–45)
HGB BLD-MCNC: 8.6 G/DL — LOW (ref 11.5–15.5)
IMM GRANULOCYTES NFR BLD AUTO: 0.4 % — SIGNIFICANT CHANGE UP (ref 0–1.5)
LYMPHOCYTES # BLD AUTO: 2.37 K/UL — SIGNIFICANT CHANGE UP (ref 1–3.3)
LYMPHOCYTES # BLD AUTO: 24.2 % — SIGNIFICANT CHANGE UP (ref 13–44)
MAGNESIUM SERPL-MCNC: 2.3 MG/DL — SIGNIFICANT CHANGE UP (ref 1.6–2.6)
MCHC RBC-ENTMCNC: 26.5 PG — LOW (ref 27–34)
MCHC RBC-ENTMCNC: 32.3 % — SIGNIFICANT CHANGE UP (ref 32–36)
MCV RBC AUTO: 81.8 FL — SIGNIFICANT CHANGE UP (ref 80–100)
MONOCYTES # BLD AUTO: 1.04 K/UL — HIGH (ref 0–0.9)
MONOCYTES NFR BLD AUTO: 10.6 % — SIGNIFICANT CHANGE UP (ref 2–14)
NEUTROPHILS # BLD AUTO: 5.86 K/UL — SIGNIFICANT CHANGE UP (ref 1.8–7.4)
NEUTROPHILS NFR BLD AUTO: 59.8 % — SIGNIFICANT CHANGE UP (ref 43–77)
NRBC # FLD: 0.02 K/UL — SIGNIFICANT CHANGE UP (ref 0–0)
PHOSPHATE SERPL-MCNC: 4.2 MG/DL — SIGNIFICANT CHANGE UP (ref 2.5–4.5)
PLATELET # BLD AUTO: 174 K/UL — SIGNIFICANT CHANGE UP (ref 150–400)
PMV BLD: 11.7 FL — SIGNIFICANT CHANGE UP (ref 7–13)
POTASSIUM SERPL-MCNC: 4.6 MMOL/L — SIGNIFICANT CHANGE UP (ref 3.5–5.3)
POTASSIUM SERPL-SCNC: 4.6 MMOL/L — SIGNIFICANT CHANGE UP (ref 3.5–5.3)
PROT SERPL-MCNC: 6.9 G/DL — SIGNIFICANT CHANGE UP (ref 6–8.3)
RBC # BLD: 3.25 M/UL — LOW (ref 3.8–5.2)
RBC # FLD: 14.3 % — SIGNIFICANT CHANGE UP (ref 10.3–14.5)
SODIUM SERPL-SCNC: 140 MMOL/L — SIGNIFICANT CHANGE UP (ref 135–145)
WBC # BLD: 9.8 K/UL — SIGNIFICANT CHANGE UP (ref 3.8–10.5)
WBC # FLD AUTO: 9.8 K/UL — SIGNIFICANT CHANGE UP (ref 3.8–10.5)

## 2019-06-24 PROCEDURE — 99233 SBSQ HOSP IP/OBS HIGH 50: CPT

## 2019-06-24 RX ORDER — HEPARIN SODIUM 5000 [USP'U]/ML
5000 INJECTION INTRAVENOUS; SUBCUTANEOUS EVERY 8 HOURS
Refills: 0 | Status: DISCONTINUED | OUTPATIENT
Start: 2019-06-24 | End: 2019-06-30

## 2019-06-24 RX ORDER — CAPTOPRIL 12.5 MG/1
6.25 TABLET ORAL THREE TIMES A DAY
Refills: 0 | Status: DISCONTINUED | OUTPATIENT
Start: 2019-06-25 | End: 2019-06-25

## 2019-06-24 RX ORDER — METOPROLOL TARTRATE 50 MG
12.5 TABLET ORAL DAILY
Refills: 0 | Status: DISCONTINUED | OUTPATIENT
Start: 2019-06-25 | End: 2019-06-25

## 2019-06-24 RX ORDER — METOPROLOL TARTRATE 50 MG
12.5 TABLET ORAL DAILY
Refills: 0 | Status: DISCONTINUED | OUTPATIENT
Start: 2019-06-24 | End: 2019-06-24

## 2019-06-24 RX ADMIN — ISOSORBIDE DINITRATE 10 MILLIGRAM(S): 5 TABLET ORAL at 05:42

## 2019-06-24 RX ADMIN — SODIUM CHLORIDE 3 MILLILITER(S): 9 INJECTION INTRAMUSCULAR; INTRAVENOUS; SUBCUTANEOUS at 05:43

## 2019-06-24 RX ADMIN — Medication 3 UNIT(S): at 12:04

## 2019-06-24 RX ADMIN — Medication 2: at 21:15

## 2019-06-24 RX ADMIN — PANTOPRAZOLE SODIUM 40 MILLIGRAM(S): 20 TABLET, DELAYED RELEASE ORAL at 05:42

## 2019-06-24 RX ADMIN — Medication 6: at 17:12

## 2019-06-24 RX ADMIN — Medication 81 MILLIGRAM(S): at 12:06

## 2019-06-24 RX ADMIN — SENNA PLUS 2 TABLET(S): 8.6 TABLET ORAL at 21:14

## 2019-06-24 RX ADMIN — HEPARIN SODIUM 5000 UNIT(S): 5000 INJECTION INTRAVENOUS; SUBCUTANEOUS at 21:14

## 2019-06-24 RX ADMIN — Medication 3 UNIT(S): at 17:13

## 2019-06-24 RX ADMIN — SODIUM CHLORIDE 3 MILLILITER(S): 9 INJECTION INTRAMUSCULAR; INTRAVENOUS; SUBCUTANEOUS at 21:16

## 2019-06-24 RX ADMIN — TICAGRELOR 90 MILLIGRAM(S): 90 TABLET ORAL at 21:14

## 2019-06-24 RX ADMIN — Medication 100 MILLIGRAM(S): at 17:56

## 2019-06-24 RX ADMIN — ATORVASTATIN CALCIUM 40 MILLIGRAM(S): 80 TABLET, FILM COATED ORAL at 21:14

## 2019-06-24 RX ADMIN — Medication 4: at 12:03

## 2019-06-24 RX ADMIN — HEPARIN SODIUM 5000 UNIT(S): 5000 INJECTION INTRAVENOUS; SUBCUTANEOUS at 14:30

## 2019-06-24 RX ADMIN — INSULIN GLARGINE 30 UNIT(S): 100 INJECTION, SOLUTION SUBCUTANEOUS at 21:15

## 2019-06-24 RX ADMIN — SODIUM CHLORIDE 3 MILLILITER(S): 9 INJECTION INTRAMUSCULAR; INTRAVENOUS; SUBCUTANEOUS at 14:47

## 2019-06-24 RX ADMIN — Medication 3 UNIT(S): at 08:09

## 2019-06-24 RX ADMIN — CHLORHEXIDINE GLUCONATE 1 APPLICATION(S): 213 SOLUTION TOPICAL at 12:07

## 2019-06-24 RX ADMIN — Medication 2: at 08:09

## 2019-06-24 RX ADMIN — Medication 12.5 MILLIGRAM(S): at 15:05

## 2019-06-24 RX ADMIN — TICAGRELOR 90 MILLIGRAM(S): 90 TABLET ORAL at 08:11

## 2019-06-24 NOTE — PROGRESS NOTE ADULT - ASSESSMENT
73 year old female with CKd stage 4, HTN, hyperlipidemia, DM-II, CHF (EF 20%) w/ ICD in left chest, and CAD with multiple PTCA/stents, most recent Feb 2019 who transfer from [facility] for NSTEMI s/p cath found to have 99% LM and 100% LAD received intra-aortic balloon pump and was transferred to CCU post cath for further management. also noted to have liver lesion likely need biopsy     CKD stage 4  baseline ~ 1.7-1.9, now at baseline. She remains non-Oliguric.  CKD likely sec to DM/HTN/CHF  Continue to monitor bmp, monitor I/O  avoid nephrotoxic agents  monitor bmp    ** possible triple phase CT with contrast for liver lesion per hepatology, will need please give mucomyst 1200 bid for 4 doses start 1 day prior to test, last dose after test, sodium bicarb 150meq/L in sterile water at 200cc/hr x 1 hr 1hr prior to test then 75cc/hr x 6 hr after.    HTN  controlled  monitor BP    proteinuria  check urine p/c ratio  likely sec to DM  vasculitis work up ordered outpatient but never done.     NSTEMI  f/u cardio    CKD-MBD  check PTH, phos level  monitor phos and calcium daily

## 2019-06-24 NOTE — PROGRESS NOTE ADULT - ASSESSMENT
73 year old female with CKd stage 4, HTN, hyperlipidemia, DM-II, CHF (EF 20%) w/ ICD in left chest, and CAD with multiple PTCA/stents, most recent Feb 2019 who transfer from [facility] for NSTEMI s/p cath found to have 99% LM and 100% LAD received intra-aortic balloon pump and was transferred to CCU post cath for further management. also noted to have liver lesion likely need biopsy        Problem/Recommendation - 1:  Problem: NSTEMI (non-ST elevated myocardial infarction).   s/p stent to the left main via right radial artery  right radial artery site with hematoma and tender to touch. Will continue to closely monitor, Dopplers are pending  -continue with DAPT with asa and brilinta and lipitor 40  -patient heparin discontinued and balloon pump is scheduled to be removed today  Problem/Recommendation - 2:  ·  Problem: CAD (coronary artery disease).    -patient % is chronic, patient will not be going for staging to the LAD, spoke with Dr. Lima  -continue isordil 10 tid, will consider BB late today if patient tolerates balloon pump removal  Problem/Recommendation - 3:  Problem: Liver lesion.    -hepatology consulted, no further work up at this time   -the lesion is likely an angiomyolipoma, and not a malignant lesion.  Problem/Recommendation - 4:  ·  Problem: Hypertension  -BP stable at this time, will consider adding BB later tonight once balloon pump is removed. Continue isordil 10 tid  Problem/Recommendation - 5:  ·  Problem: Hyperlipidemia  -lipitor 40     Problem/Recommendation - 6:  Problem: Chronic CHF.   -patient is s/p revascularization, will initiate BB later today, no need for diuretic at this time  Problem/Recommendation - 7:  Problem: RUTH (acute kidney injury). Recommendation: will continue to monitor BUN and creatinine  -avoid nephrotoxic agents, patient did receive contrast with cardiac cath.    Problem/Recommendation - 8:  Problem: Anemia. Recommendation: will continue to monitor CBC.    Problem/Recommendation - 9:  Problem: Diabetes mellitus type 2 in nonobese. Recommendation: will continue lantus   25 units qhs, (home dose is 35units) and fingersticks QAC and QHS, as well as ISS.    Problem/Recommendation - 10:  Problem: VTE (venous thromboembolism). Recommendation: she will be on a heparin gtt for balloon pump 1/:, this will also be sufficient for VTE prophylaxis. 73 year old female with CKd stage 4, HTN, hyperlipidemia, DM-II, CHF (EF 20%) w/ ICD in left chest, and CAD with multiple PTCA/stents, most recent Feb 2019 who transfer from [facility] for NSTEMI s/p cath found to have 99% LM and 100% LAD received intra-aortic balloon pump and was transferred to CCU post cath for further management. also noted to have liver lesion likely need biopsy. S/p stent to L main on 6/21 and IABP removal 6/23.         Problem/Recommendation - 1:  Problem: NSTEMI (non-ST elevated myocardial infarction).   s/p stent to the left main via right radial artery  right radial artery site with hematoma, now decreased in size, nontender.  RUE doppler negative   -continue with DAPT with asa and brilinta and lipitor 40  - d/c IABP yesterday     Problem/Recommendation - 2:  ·  Problem: CAD (coronary artery disease).    -patient % is chronic, patient will not be going for staging to the LAD, spoke with Dr. Lima  -continue isordil 10 tid  Problem/Recommendation - 3:  Problem: Liver lesion.    -hepatology consulted, no further work up at this time   -the lesion is likely an angiomyolipoma, and not a malignant lesion.  Problem/Recommendation - 4:  ·  Problem: Hypertension  -BP stable at this time, will consider adding BB later tonight once balloon pump is removed. Continue isordil 10 tid  Problem/Recommendation - 5:  ·  Problem: Hyperlipidemia  -lipitor 40     Problem/Recommendation - 6:  Problem: Chronic CHF.   -patient is s/p revascularization, will initiate BB today, no need for diuretic at this time  Problem/Recommendation - 7:  Problem: RUTH (acute kidney injury). Recommendation: will continue to monitor BUN and creatinine  -avoid nephrotoxic agents, patient did receive contrast with cardiac cath.    Problem/Recommendation - 8:  Problem: Anemia. Recommendation: will continue to monitor CBC.    Problem/Recommendation - 9:  Problem: Diabetes mellitus type 2 in nonobese. Recommendation: will continue lantus   25 units qhs, (home dose is 35units) and fingersticks QAC and QHS, as well as ISS.    Problem/Recommendation - 10:  Problem: VTE (venous thromboembolism). Recommendation: she will be on a heparin gtt for balloon pump 1/:, this will also be sufficient for VTE prophylaxis. 73 year old female with CKd stage 4, HTN, hyperlipidemia, DM-II, CHF (EF 20%) w/ ICD in left chest, and CAD with multiple PTCA/stents, most recent Feb 2019 [facility]p cath found to have 99% LM and 100% LAD received intra-aortic balloon pump and was transferred to CCU post cath for further management. also noted to have liver lesion likely need biopsy. S/p stent to L main on 6/21 and IABP removal 6/23.         Problem/Recommendation - 1:  Problem: NSTEMI (non-ST elevated myocardial infarction).   s/p stent to the left main via right radial artery  right radial artery site with hematoma, now decreased in size, nontender.  RUE doppler negative   -continue with DAPT with asa and brilinta and lipitor 40  - d/c IABP yesterday     Problem/Recommendation - 2:  ·  Problem: CAD (coronary artery disease).    -patient % is chronic, patient will not be going for staging to the LAD, spoke with Dr. Lima  -d/c isordil, will consider starting BB later today if BP tolerates    Problem/Recommendation - 3:  Problem: Liver lesion.    -hepatology consulted, no further work up at this time   -the lesion is likely an angiomyolipoma, and not a malignant lesion.  Problem/Recommendation - 4:  ·  Problem: Hypertension  -BP stable at this time, will consider adding BB later tonight once balloon pump is removed. Continue isordil 10 tid  Problem/Recommendation - 5:  ·  Problem: Hyperlipidemia  -lipitor 40     Problem/Recommendation - 6:  Problem: Chronic CHF.   -patient is s/p revascularization, will initiate BB today, no need for diuretic at this time  Problem/Recommendation - 7:  Problem: RUTH (acute kidney injury). Recommendation: will continue to monitor BUN and creatinine  -avoid nephrotoxic agents, patient did receive contrast with cardiac cath.    Problem/Recommendation - 8:  Problem: Anemia. Recommendation: will continue to monitor CBC.    Problem/Recommendation - 9:  Problem: Diabetes mellitus type 2 in nonobese. Recommendation: will continue lantus   25 units qhs, (home dose is 35units) and fingersticks QAC and QHS, as well as ISS.    Problem/Recommendation - 10:  Problem: VTE (venous thromboembolism). Recommendation: heparin subQ

## 2019-06-24 NOTE — DIETITIAN INITIAL EVALUATION ADULT. - NS AS NUTRI DX NUTRIENT
Malnutrition/Severe Malnutrition: NFPA: Temporal muscle wasting, Suborbital fat loss with 2+ edema meeting criteria. Moderate Malnutrition: NFPA: Temporal muscle wasting, Suborbital fat loss with 2+ edema meeting criteria./Malnutrition Moderate with 2+ edema meeting criteria./Malnutrition

## 2019-06-24 NOTE — PROGRESS NOTE ADULT - SUBJECTIVE AND OBJECTIVE BOX
Subjective/Objective:     MEDICATIONS  (STANDING):  aspirin enteric coated 81 milliGRAM(s) Oral daily  atorvastatin 40 milliGRAM(s) Oral at bedtime  chlorhexidine 4% Liquid 1 Application(s) Topical <User Schedule>  dextrose 5%. 1000 milliLiter(s) (50 mL/Hr) IV Continuous <Continuous>  dextrose 50% Injectable 12.5 Gram(s) IV Push once  dextrose 50% Injectable 25 Gram(s) IV Push once  docusate sodium 100 milliGRAM(s) Oral two times a day  insulin glargine Injectable (LANTUS) 30 Unit(s) SubCutaneous at bedtime  insulin lispro (HumaLOG) corrective regimen sliding scale   SubCutaneous three times a day before meals  insulin lispro (HumaLOG) corrective regimen sliding scale   SubCutaneous at bedtime  insulin lispro Injectable (HumaLOG) 3 Unit(s) SubCutaneous three times a day before meals  isosorbide   dinitrate Tablet (ISORDIL) 10 milliGRAM(s) Oral three times a day  pantoprazole    Tablet 40 milliGRAM(s) Oral before breakfast  senna 2 Tablet(s) Oral at bedtime  sodium chloride 0.9% lock flush 3 milliLiter(s) IV Push every 8 hours  ticagrelor 90 milliGRAM(s) Oral every 12 hours    MEDICATIONS  (PRN):  acetaminophen   Tablet .. 650 milliGRAM(s) Oral every 6 hours PRN Mild Pain (1 - 3), Moderate Pain (4 - 6), Severe Pain (7 - 10)  dextrose 40% Gel 15 Gram(s) Oral once PRN Blood Glucose LESS THAN 70 milliGRAM(s)/deciliter  glucagon  Injectable 1 milliGRAM(s) IntraMuscular once PRN Glucose LESS THAN 70 milligrams/deciliter          Vital Signs Last 24 Hrs  T(C): 36.9 (24 Jun 2019 04:00), Max: 37 (23 Jun 2019 08:00)  T(F): 98.4 (24 Jun 2019 04:00), Max: 98.6 (23 Jun 2019 08:00)  HR: 84 (24 Jun 2019 06:00) (82 - 106)  BP: 105/49 (24 Jun 2019 06:00) (84/72 - 133/76)  BP(mean): 60 (24 Jun 2019 06:00) (53 - 90)  RR: 17 (24 Jun 2019 06:00) (9 - 22)  SpO2: 100% (24 Jun 2019 06:00) (97% - 100%)  I&O's Detail    23 Jun 2019 07:01  -  24 Jun 2019 07:00  --------------------------------------------------------  IN:    Oral Fluid: 230 mL    Sodium Chloride 0.9% IV Bolus: 250 mL  Total IN: 480 mL    OUT:    Voided: 600 mL  Total OUT: 600 mL    Total NET: -120 mL            PHYSICAL EXAM  GEN:  RESP:  CV:  GI:  EXT:  NEURO:  PSYCH:        EKG/ TELEM:    LABS:                          8.6    9.80  )-----------( 174      ( 24 Jun 2019 03:10 )             26.6     PTT - ( 22 Jun 2019 16:20 )  PTT:86.1 SEC  24 Jun 2019 03:10    140    |  102    |  52<H>  ----------------------------<  155<H>  4.6     |  23     |  1.88<H>  23 Jun 2019 05:22    138    |  100    |  54<H>  ----------------------------<  237<H>  4.1     |  24     |  1.75<H>    Ca    8.7        24 Jun 2019 03:10  Ca    8.4        23 Jun 2019 05:22  Phos  4.2       24 Jun 2019 03:10  Phos  4.7<H>     23 Jun 2019 05:22  Mg     2.3       24 Jun 2019 03:10  Mg     2.1       23 Jun 2019 05:22    TPro  6.9    /  Alb  3.2<L>  /  TBili  0.3    /  DBili  x      /  AST  37<H>  /  ALT  22     /  AlkPhos  107    24 Jun 2019 03:10                      Diagnostic testing:        Assessment and Plan: Subjective/Objective:   No acute events overnight. Pt denies CP/SOB this AM. Reports mild abdominal pain, no nausea/vomiting. No R arm pain.     MEDICATIONS  (STANDING):  aspirin enteric coated 81 milliGRAM(s) Oral daily  atorvastatin 40 milliGRAM(s) Oral at bedtime  chlorhexidine 4% Liquid 1 Application(s) Topical <User Schedule>  dextrose 5%. 1000 milliLiter(s) (50 mL/Hr) IV Continuous <Continuous>  dextrose 50% Injectable 12.5 Gram(s) IV Push once  dextrose 50% Injectable 25 Gram(s) IV Push once  docusate sodium 100 milliGRAM(s) Oral two times a day  insulin glargine Injectable (LANTUS) 30 Unit(s) SubCutaneous at bedtime  insulin lispro (HumaLOG) corrective regimen sliding scale   SubCutaneous three times a day before meals  insulin lispro (HumaLOG) corrective regimen sliding scale   SubCutaneous at bedtime  insulin lispro Injectable (HumaLOG) 3 Unit(s) SubCutaneous three times a day before meals  isosorbide   dinitrate Tablet (ISORDIL) 10 milliGRAM(s) Oral three times a day  pantoprazole    Tablet 40 milliGRAM(s) Oral before breakfast  senna 2 Tablet(s) Oral at bedtime  sodium chloride 0.9% lock flush 3 milliLiter(s) IV Push every 8 hours  ticagrelor 90 milliGRAM(s) Oral every 12 hours    MEDICATIONS  (PRN):  acetaminophen   Tablet .. 650 milliGRAM(s) Oral every 6 hours PRN Mild Pain (1 - 3), Moderate Pain (4 - 6), Severe Pain (7 - 10)  dextrose 40% Gel 15 Gram(s) Oral once PRN Blood Glucose LESS THAN 70 milliGRAM(s)/deciliter  glucagon  Injectable 1 milliGRAM(s) IntraMuscular once PRN Glucose LESS THAN 70 milligrams/deciliter          Vital Signs Last 24 Hrs  T(C): 36.9 (24 Jun 2019 04:00), Max: 37 (23 Jun 2019 08:00)  T(F): 98.4 (24 Jun 2019 04:00), Max: 98.6 (23 Jun 2019 08:00)  HR: 84 (24 Jun 2019 06:00) (82 - 106)  BP: 105/49 (24 Jun 2019 06:00) (84/72 - 133/76)  BP(mean): 60 (24 Jun 2019 06:00) (53 - 90)  RR: 17 (24 Jun 2019 06:00) (9 - 22)  SpO2: 100% (24 Jun 2019 06:00) (97% - 100%)  I&O's Detail    23 Jun 2019 07:01  -  24 Jun 2019 07:00  --------------------------------------------------------  IN:    Oral Fluid: 230 mL    Sodium Chloride 0.9% IV Bolus: 250 mL  Total IN: 480 mL    OUT:    Voided: 600 mL  Total OUT: 600 mL    Total NET: -120 mL            PHYSICAL EXAM  General: WN/WD NAD  Neurology: A&Ox3  Eyes: PERRLA/ EOMI, Gross vision intact  ENT/Neck: Neck supple  Respiratory: CTA B/L, No wheezing, rales, rhonchi  CV: RRR, S1S2, no murmurs, rubs or gallops  Abdominal: Mild ttp over RUQ, soft, nondistended   Extremities: No edema, + peripheral pulses. R arm hematoma decreased in size, no warmth/erythema   Skin: No Rashes, Hematoma, Ecchymosis        EKG/ TELEM:    LABS:                          8.6    9.80  )-----------( 174      ( 24 Jun 2019 03:10 )             26.6     PTT - ( 22 Jun 2019 16:20 )  PTT:86.1 SEC  24 Jun 2019 03:10    140    |  102    |  52<H>  ----------------------------<  155<H>  4.6     |  23     |  1.88<H>  23 Jun 2019 05:22    138    |  100    |  54<H>  ----------------------------<  237<H>  4.1     |  24     |  1.75<H>    Ca    8.7        24 Jun 2019 03:10  Ca    8.4        23 Jun 2019 05:22  Phos  4.2       24 Jun 2019 03:10  Phos  4.7<H>     23 Jun 2019 05:22  Mg     2.3       24 Jun 2019 03:10  Mg     2.1       23 Jun 2019 05:22    TPro  6.9    /  Alb  3.2<L>  /  TBili  0.3    /  DBili  x      /  AST  37<H>  /  ALT  22     /  AlkPhos  107    24 Jun 2019 03:10                      Diagnostic testing:        Assessment and Plan:

## 2019-06-24 NOTE — PROGRESS NOTE ADULT - ATTENDING COMMENTS
Patient seen and examined  Agree with above assessment and plan  Patient with CAD sp NSTEMI and PCI  IABP dc yesterday  Doing well  Can dc isordil  Plan to start either ACEi or BB later today if BP can tolerate Skin normal color for race, warm, dry and intact. No evidence of rash.

## 2019-06-24 NOTE — DIETITIAN INITIAL EVALUATION ADULT. - ADHERENCE
poor/Pt with HbA1C 15.1. Pt received Consistent Carbohydrate Diet instruction at recent [facility] admission. Pt reports that she will be following her diet more closely at home.

## 2019-06-24 NOTE — PROGRESS NOTE ADULT - SUBJECTIVE AND OBJECTIVE BOX
Patient seen and examined. She feels great-sitting in chair, eating lunch. Had some SCOTT when transferring from bed to chair, but currently no SOB. No orthopnea, PND, CP, palpitations.       Medications:  acetaminophen   Tablet .. 650 milliGRAM(s) Oral every 6 hours PRN  aspirin enteric coated 81 milliGRAM(s) Oral daily  atorvastatin 40 milliGRAM(s) Oral at bedtime  chlorhexidine 4% Liquid 1 Application(s) Topical <User Schedule>  dextrose 40% Gel 15 Gram(s) Oral once PRN  dextrose 5%. 1000 milliLiter(s) IV Continuous <Continuous>  dextrose 50% Injectable 12.5 Gram(s) IV Push once  dextrose 50% Injectable 25 Gram(s) IV Push once  docusate sodium 100 milliGRAM(s) Oral two times a day  glucagon  Injectable 1 milliGRAM(s) IntraMuscular once PRN  heparin  Injectable 5000 Unit(s) SubCutaneous every 8 hours  insulin glargine Injectable (LANTUS) 30 Unit(s) SubCutaneous at bedtime  insulin lispro (HumaLOG) corrective regimen sliding scale   SubCutaneous three times a day before meals  insulin lispro (HumaLOG) corrective regimen sliding scale   SubCutaneous at bedtime  insulin lispro Injectable (HumaLOG) 3 Unit(s) SubCutaneous three times a day before meals  pantoprazole    Tablet 40 milliGRAM(s) Oral before breakfast  senna 2 Tablet(s) Oral at bedtime  sodium chloride 0.9% lock flush 3 milliLiter(s) IV Push every 8 hours  ticagrelor 90 milliGRAM(s) Oral every 12 hours      Vitals:  Vital Signs Last 24 Hrs  T(C): 36.9 (2019 04:00), Max: 36.9 (2019 04:00)  T(F): 98.4 (2019 04:00), Max: 98.4 (2019 04:00)  HR: 81 (2019 11:00) (81 - 106)  BP: 108/41 (2019 11:00) (84/72 - 133/76)  BP(mean): 58 (2019 11:00) (51 - 90)  RR: 13 (2019 11:00) (9 - 22)  SpO2: 100% (2019 08:00) (98% - 100%)    Daily     Daily Weight in k.7 (2019 05:00)    I&O's Detail    2019 07:01  -  2019 07:00  --------------------------------------------------------  IN:    Oral Fluid: 230 mL    Sodium Chloride 0.9% IV Bolus: 250 mL  Total IN: 480 mL    OUT:    Voided: 600 mL  Total OUT: 600 mL    Total NET: -120 mL          Physical Exam:     General: No distress. Comfortable.  HEENT: EOM intact.  Neck: Neck supple. JVP not elevated. No masses  Chest: Clear to auscultation bilaterally  CV: S1 and S2 RRR. No murmurs, rub, or gallops. Radial pulses normal. No LE edema b/l. Warm b/l.   Abdomen: Soft, non-distended, non-tender  Skin: No rashes or skin breakdown  Neurology: Alert and oriented times three. Sensation intact  Psych: Affect normal    Labs:                        8.6    9.80  )-----------( 174      ( 2019 03:10 )             26.6     06-24    140  |  102  |  52<H>  ----------------------------<  155<H>  4.6   |  23  |  1.88<H>    Ca    8.7      2019 03:10  Phos  4.2     06-24  Mg     2.3     06-24    TPro  6.9  /  Alb  3.2<L>  /  TBili  0.3  /  DBili  x   /  AST  37<H>  /  ALT  22  /  AlkPhos  107  06-24    PTT - ( 2019 16:20 )  PTT:86.1 SEC

## 2019-06-24 NOTE — PROGRESS NOTE ADULT - SUBJECTIVE AND OBJECTIVE BOX
[facility] [place] [facility]   MD YENI MARTÍNEZ MD ANGELA WONG, PA    TEL:  OFFICE: [phone]  DR LEUNG CELL: [phone]  DR. LUNA CELL: [phone]  JAJA VELASQUEZ CELL: [phone]        Patient is a 73y old  Female who presents with a chief complaint of Chest pains (23 Jun 2019 15:18)      Patient seen and examined at bedside. No chest pain/sob    VITALS:  T(F): 98.4 (06-24-19 @ 04:00), Max: 98.4 (06-24-19 @ 04:00)  HR: 81 (06-24-19 @ 11:00)  BP: 108/41 (06-24-19 @ 11:00)  RR: 13 (06-24-19 @ 11:00)  SpO2: 100% (06-24-19 @ 08:00)  Wt(kg): --    06-23 @ 07:01  -  06-24 @ 07:00  --------------------------------------------------------  IN: 480 mL / OUT: 600 mL / NET: -120 mL          PHYSICAL EXAM:  Constitutional: NAD  Neck: No JVD  Respiratory: CTAB, no wheezes, rales or rhonchi  Cardiovascular: S1, S2, RRR  Gastrointestinal: BS+, soft, NT/ND  Extremities: No peripheral edema    Hospital Medications:   MEDICATIONS  (STANDING):  aspirin enteric coated 81 milliGRAM(s) Oral daily  atorvastatin 40 milliGRAM(s) Oral at bedtime  chlorhexidine 4% Liquid 1 Application(s) Topical <User Schedule>  dextrose 5%. 1000 milliLiter(s) (50 mL/Hr) IV Continuous <Continuous>  dextrose 50% Injectable 12.5 Gram(s) IV Push once  dextrose 50% Injectable 25 Gram(s) IV Push once  docusate sodium 100 milliGRAM(s) Oral two times a day  heparin  Injectable 5000 Unit(s) SubCutaneous every 8 hours  insulin glargine Injectable (LANTUS) 30 Unit(s) SubCutaneous at bedtime  insulin lispro (HumaLOG) corrective regimen sliding scale   SubCutaneous three times a day before meals  insulin lispro (HumaLOG) corrective regimen sliding scale   SubCutaneous at bedtime  insulin lispro Injectable (HumaLOG) 3 Unit(s) SubCutaneous three times a day before meals  pantoprazole    Tablet 40 milliGRAM(s) Oral before breakfast  senna 2 Tablet(s) Oral at bedtime  sodium chloride 0.9% lock flush 3 milliLiter(s) IV Push every 8 hours  ticagrelor 90 milliGRAM(s) Oral every 12 hours      LABS:  06-24    140  |  102  |  52<H>  ----------------------------<  155<H>  4.6   |  23  |  1.88<H>    Ca    8.7      24 Jun 2019 03:10  Phos  4.2     06-24  Mg     2.3     06-24    TPro  6.9  /  Alb  3.2<L>  /  TBili  0.3  /  DBili      /  AST  37<H>  /  ALT  22  /  AlkPhos  107  06-24    Creatinine Trend: 1.88 <--, 1.75 <--, 1.79 <--, 1.99 <--, 1.84 <--, 1.78 <--, 2.19 <--, 2.04 <--    Phosphorus Level, Serum: 4.2 mg/dL (06-24 @ 03:10)  Albumin, Serum: 3.2 g/dL (06-24 @ 03:10)                              8.6    9.80  )-----------( 174      ( 24 Jun 2019 03:10 )             26.6     Urine Studies:      HbA1c 15.1      [06-15-19 @ 12:17]  TSH 1.66      [06-15-19 @ 07:30]  Lipid: chol 118, , HDL 29, LDL 56      [06-15-19 @ 07:30]    HBsAb Reactive      [06-19-19 @ 19:32]  HBsAg NEGATIVE      [06-20-19 @ 12:00]  HBcAb Reactive      [06-19-19 @ 19:32]  HCV 0.09, Nonreact      [06-15-19 @ 12:23]      RADIOLOGY & ADDITIONAL STUDIES:

## 2019-06-24 NOTE — DIETITIAN INITIAL EVALUATION ADULT. - OTHER INFO
74 y/o female transferred from [facility] for NSTEMI s/p cath. Pt acknowledges poor PO PTA due to ongoing  chronic illness. KNFA. No GI distress (nausea/vomiting/diarrhea/constipation.) Food preferences taken. PT followed No Concentrated Sweets diet PTA.

## 2019-06-24 NOTE — PROGRESS NOTE ADULT - ASSESSMENT
72 y/o HTN, HLD, DM II, HFrEF (20%), MI 8 years ago, CAD w/ stents (last in feb 2019), ICD, CKD. Patient has had numerous hospitlizations, 4 this year alone for CP and SOB (at [facility], [facility], [facility]). She initially presented to [facility] for CP, abodminal pain and SOB. She was found to have NSTEMI and was sent to [facility] for possible PCI. She had a LHC 6/19 which showed LM 95% stenosis, prox %, prox circ 20% at prior stent, OM I 30% at prior stent, mid RCA 30% prior stent.  RHC RA 13, PA 66/29/43, PCWP 28, PA sat 39%, CO 2.85, CI 1.78, PVR 5.27, SVR 1770. Pt admits to SCOTT after a few steps for many months, also had PND, orthopnea. Currently she is feeling a lot better, no SOB at rest.

## 2019-06-24 NOTE — DIETITIAN INITIAL EVALUATION ADULT. - SIGNS/SYMPTOMS
as evidenced by 8 pound wt loss over 3 months, Poor PO x 6 days. 8 pound wt loss over 3 months, Poor PO x 6 days, Temporal muscle wasting, Suborbital fat loss.

## 2019-06-24 NOTE — DIETITIAN INITIAL EVALUATION ADULT. - NS FNS WEIGHT CHANGE REASON
Pt stated poor appetite and PO due to not feeling well throughout recent hospital admissions./unintentional

## 2019-06-24 NOTE — CHART NOTE - NSCHARTNOTEFT_GEN_A_CORE
NUTRITION SERVICES     Upon Nutritional Assessment by the Registered Dietitian your patient was determined to meet criteria/ has evidence of the following diagnosis/diagnoses:  [ ] Mild Protein Calorie Malnutrition   [ ] Moderate Protein Calorie Malnutrition   [x] Severe Protein Calorie Malnutrition   [ ] Unspecified Protein Calorie Malnutrition   [ ] Underweight / BMI <19  [ ] Morbid Obesity / BMI >40    Findings as based on:  •  Comprehensive nutritional assessment and consultation    Please refer to Initial Dietitian Evaluation via documents section of [employer] EMR for further recommendations.

## 2019-06-24 NOTE — PROGRESS NOTE ADULT - PROBLEM SELECTOR PLAN 1
s/p LM revascularization on 6/21. Now off IABP. BP stable.   Last hemodynamics prior to IABP removal excellent- PA sat 72.1%, CO 7.67, CI 4.64.   Pt has some SCOTT today.  Will need to likely start PO diuretic and some afterload reduction over next few days. Lasix 40 mg po qd and low dose hydral/isordil 5 tid? Also low dose BB, toprol 12.5 qd? Will d/w Dr. Rosen.   Continue strict I/O. Get daily standing weight. s/p LM revascularization on 6/21. Now off IABP. BP stable.   Last hemodynamics prior to IABP removal excellent- PA sat 72.1%, CO 7.67, CI 4.64.   Pt has some SCOTT today.  Will need to likely start PO diuretic and some afterload reduction over next few days. Lasix 40 mg po qd and low dose hydral/isordil 5 tid? Also low dose BB, Toprol 12.5 qd? Will d/w Dr. Rosen.   Continue strict I/O. Get daily standing weight.

## 2019-06-24 NOTE — DIETITIAN INITIAL EVALUATION ADULT. - PERTINENT LABORATORY DATA
06-24 Na 140 mmol/L Glu 155 mg/dL<H> K+ 4.6 mmol/L Cr 1.88 mg/dL<H> BUN 52 mg/dL<H> Phos 4.2 mg/dL  06-15-19 HbA1c 15.1 %

## 2019-06-25 LAB
ALBUMIN SERPL ELPH-MCNC: 3.5 G/DL — SIGNIFICANT CHANGE UP (ref 3.3–5)
ALBUMIN SERPL ELPH-MCNC: 3.5 G/DL — SIGNIFICANT CHANGE UP (ref 3.3–5)
ALP SERPL-CCNC: 121 U/L — HIGH (ref 40–120)
ALP SERPL-CCNC: 123 U/L — HIGH (ref 40–120)
ALT FLD-CCNC: 40 U/L — HIGH (ref 4–33)
ALT FLD-CCNC: 43 U/L — HIGH (ref 4–33)
ANION GAP SERPL CALC-SCNC: 13 MMO/L — SIGNIFICANT CHANGE UP (ref 7–14)
ANION GAP SERPL CALC-SCNC: 16 MMO/L — HIGH (ref 7–14)
APTT BLD: 91.2 SEC — HIGH (ref 27.5–36.3)
AST SERPL-CCNC: 54 U/L — HIGH (ref 4–32)
AST SERPL-CCNC: 64 U/L — HIGH (ref 4–32)
BILIRUB SERPL-MCNC: 0.3 MG/DL — SIGNIFICANT CHANGE UP (ref 0.2–1.2)
BILIRUB SERPL-MCNC: 0.4 MG/DL — SIGNIFICANT CHANGE UP (ref 0.2–1.2)
BUN SERPL-MCNC: 49 MG/DL — HIGH (ref 7–23)
BUN SERPL-MCNC: 51 MG/DL — HIGH (ref 7–23)
CALCIUM SERPL-MCNC: 9.5 MG/DL — SIGNIFICANT CHANGE UP (ref 8.4–10.5)
CALCIUM SERPL-MCNC: 9.5 MG/DL — SIGNIFICANT CHANGE UP (ref 8.4–10.5)
CHLORIDE SERPL-SCNC: 101 MMOL/L — SIGNIFICANT CHANGE UP (ref 98–107)
CHLORIDE SERPL-SCNC: 102 MMOL/L — SIGNIFICANT CHANGE UP (ref 98–107)
CO2 SERPL-SCNC: 21 MMOL/L — LOW (ref 22–31)
CO2 SERPL-SCNC: 22 MMOL/L — SIGNIFICANT CHANGE UP (ref 22–31)
CREAT SERPL-MCNC: 1.93 MG/DL — HIGH (ref 0.5–1.3)
CREAT SERPL-MCNC: 2.1 MG/DL — HIGH (ref 0.5–1.3)
GLUCOSE BLDC GLUCOMTR-MCNC: 125 MG/DL — HIGH (ref 70–99)
GLUCOSE BLDC GLUCOMTR-MCNC: 157 MG/DL — HIGH (ref 70–99)
GLUCOSE BLDC GLUCOMTR-MCNC: 284 MG/DL — HIGH (ref 70–99)
GLUCOSE BLDC GLUCOMTR-MCNC: 311 MG/DL — HIGH (ref 70–99)
GLUCOSE SERPL-MCNC: 167 MG/DL — HIGH (ref 70–99)
GLUCOSE SERPL-MCNC: 249 MG/DL — HIGH (ref 70–99)
HCT VFR BLD CALC: 28.3 % — LOW (ref 34.5–45)
HGB BLD-MCNC: 9 G/DL — LOW (ref 11.5–15.5)
INR BLD: 1.08 — SIGNIFICANT CHANGE UP (ref 0.88–1.17)
MAGNESIUM SERPL-MCNC: 2.2 MG/DL — SIGNIFICANT CHANGE UP (ref 1.6–2.6)
MAGNESIUM SERPL-MCNC: 2.3 MG/DL — SIGNIFICANT CHANGE UP (ref 1.6–2.6)
MCHC RBC-ENTMCNC: 26.3 PG — LOW (ref 27–34)
MCHC RBC-ENTMCNC: 31.8 % — LOW (ref 32–36)
MCV RBC AUTO: 82.7 FL — SIGNIFICANT CHANGE UP (ref 80–100)
NRBC # FLD: 0.03 K/UL — SIGNIFICANT CHANGE UP (ref 0–0)
PHOSPHATE SERPL-MCNC: 4.2 MG/DL — SIGNIFICANT CHANGE UP (ref 2.5–4.5)
PHOSPHATE SERPL-MCNC: 5 MG/DL — HIGH (ref 2.5–4.5)
PLATELET # BLD AUTO: 205 K/UL — SIGNIFICANT CHANGE UP (ref 150–400)
PMV BLD: 10.9 FL — SIGNIFICANT CHANGE UP (ref 7–13)
POTASSIUM SERPL-MCNC: 3.9 MMOL/L — SIGNIFICANT CHANGE UP (ref 3.5–5.3)
POTASSIUM SERPL-MCNC: 4.9 MMOL/L — SIGNIFICANT CHANGE UP (ref 3.5–5.3)
POTASSIUM SERPL-SCNC: 3.9 MMOL/L — SIGNIFICANT CHANGE UP (ref 3.5–5.3)
POTASSIUM SERPL-SCNC: 4.9 MMOL/L — SIGNIFICANT CHANGE UP (ref 3.5–5.3)
PROT SERPL-MCNC: 7.5 G/DL — SIGNIFICANT CHANGE UP (ref 6–8.3)
PROT SERPL-MCNC: 7.8 G/DL — SIGNIFICANT CHANGE UP (ref 6–8.3)
PROTHROM AB SERPL-ACNC: 12 SEC — SIGNIFICANT CHANGE UP (ref 9.8–13.1)
RBC # BLD: 3.42 M/UL — LOW (ref 3.8–5.2)
RBC # FLD: 14.5 % — SIGNIFICANT CHANGE UP (ref 10.3–14.5)
SODIUM SERPL-SCNC: 137 MMOL/L — SIGNIFICANT CHANGE UP (ref 135–145)
SODIUM SERPL-SCNC: 138 MMOL/L — SIGNIFICANT CHANGE UP (ref 135–145)
WBC # BLD: 10.49 K/UL — SIGNIFICANT CHANGE UP (ref 3.8–10.5)
WBC # FLD AUTO: 10.49 K/UL — SIGNIFICANT CHANGE UP (ref 3.8–10.5)

## 2019-06-25 PROCEDURE — 99233 SBSQ HOSP IP/OBS HIGH 50: CPT

## 2019-06-25 RX ORDER — FUROSEMIDE 40 MG
40 TABLET ORAL DAILY
Refills: 0 | Status: DISCONTINUED | OUTPATIENT
Start: 2019-06-25 | End: 2019-06-28

## 2019-06-25 RX ORDER — METOPROLOL TARTRATE 50 MG
12.5 TABLET ORAL EVERY 12 HOURS
Refills: 0 | Status: DISCONTINUED | OUTPATIENT
Start: 2019-06-25 | End: 2019-06-30

## 2019-06-25 RX ADMIN — Medication 650 MILLIGRAM(S): at 06:15

## 2019-06-25 RX ADMIN — SODIUM CHLORIDE 3 MILLILITER(S): 9 INJECTION INTRAMUSCULAR; INTRAVENOUS; SUBCUTANEOUS at 22:03

## 2019-06-25 RX ADMIN — HEPARIN SODIUM 5000 UNIT(S): 5000 INJECTION INTRAVENOUS; SUBCUTANEOUS at 05:49

## 2019-06-25 RX ADMIN — TICAGRELOR 90 MILLIGRAM(S): 90 TABLET ORAL at 09:24

## 2019-06-25 RX ADMIN — Medication 3 UNIT(S): at 16:56

## 2019-06-25 RX ADMIN — HEPARIN SODIUM 5000 UNIT(S): 5000 INJECTION INTRAVENOUS; SUBCUTANEOUS at 22:02

## 2019-06-25 RX ADMIN — Medication 12.5 MILLIGRAM(S): at 06:33

## 2019-06-25 RX ADMIN — SENNA PLUS 2 TABLET(S): 8.6 TABLET ORAL at 22:03

## 2019-06-25 RX ADMIN — SODIUM CHLORIDE 3 MILLILITER(S): 9 INJECTION INTRAMUSCULAR; INTRAVENOUS; SUBCUTANEOUS at 05:51

## 2019-06-25 RX ADMIN — Medication 12.5 MILLIGRAM(S): at 18:23

## 2019-06-25 RX ADMIN — CHLORHEXIDINE GLUCONATE 1 APPLICATION(S): 213 SOLUTION TOPICAL at 09:24

## 2019-06-25 RX ADMIN — Medication 650 MILLIGRAM(S): at 05:45

## 2019-06-25 RX ADMIN — SODIUM CHLORIDE 3 MILLILITER(S): 9 INJECTION INTRAMUSCULAR; INTRAVENOUS; SUBCUTANEOUS at 13:14

## 2019-06-25 RX ADMIN — INSULIN GLARGINE 30 UNIT(S): 100 INJECTION, SOLUTION SUBCUTANEOUS at 21:53

## 2019-06-25 RX ADMIN — ATORVASTATIN CALCIUM 40 MILLIGRAM(S): 80 TABLET, FILM COATED ORAL at 21:53

## 2019-06-25 RX ADMIN — Medication 81 MILLIGRAM(S): at 11:11

## 2019-06-25 RX ADMIN — Medication 100 MILLIGRAM(S): at 05:45

## 2019-06-25 RX ADMIN — TICAGRELOR 90 MILLIGRAM(S): 90 TABLET ORAL at 21:53

## 2019-06-25 RX ADMIN — Medication 3 UNIT(S): at 08:04

## 2019-06-25 RX ADMIN — Medication 6: at 16:56

## 2019-06-25 RX ADMIN — Medication 40 MILLIGRAM(S): at 11:11

## 2019-06-25 RX ADMIN — PANTOPRAZOLE SODIUM 40 MILLIGRAM(S): 20 TABLET, DELAYED RELEASE ORAL at 05:50

## 2019-06-25 RX ADMIN — Medication 2: at 08:04

## 2019-06-25 RX ADMIN — HEPARIN SODIUM 5000 UNIT(S): 5000 INJECTION INTRAVENOUS; SUBCUTANEOUS at 14:34

## 2019-06-25 RX ADMIN — Medication 4: at 21:53

## 2019-06-25 RX ADMIN — Medication 3 UNIT(S): at 12:17

## 2019-06-25 NOTE — PROGRESS NOTE ADULT - PROBLEM SELECTOR PLAN 1
s/p LM revascularization on 6/21. Now off IABP. BP stable.   Diuresed -700 ml in 24 hrs.   Had some SOB this morning. LFTs and renal function elevated today. Was given Lasix 40 mg po qd, agree to continue.  Captopril held b/c of RUTH (pt did not receive a dose). Will consider hydral/isordil 5 tid?   Continue Toprol 12.5 qd.  Continue strict I/O. Get daily standing weight.

## 2019-06-25 NOTE — PHYSICAL THERAPY INITIAL EVALUATION ADULT - DISCHARGE DISPOSITION, PT EVAL
anticipated discharge to home with home PT services to address current functional limitations to optimize safety within the home environment with the use of a rolling walker./home w/ home PT

## 2019-06-25 NOTE — PROGRESS NOTE ADULT - ASSESSMENT
73 year old female with CKd stage 4, HTN, hyperlipidemia, DM-II, CHF (EF 20%) w/ ICD in left chest, and CAD with multiple PTCA/stents, most recent Feb 2019 who transfer from [facility] for NSTEMI s/p cath found to have 99% LM and 100% LAD received intra-aortic balloon pump and was transferred to CCU post cath for further management. also noted to have liver lesion likely need biopsy. S/p stent to L main on 6/21 and IABP removal 6/23.         Problem/Recommendation - 1:  Problem: NSTEMI (non-ST elevated myocardial infarction).   s/p stent to the left main via right radial artery  right radial artery site with hematoma, now decreased in size, nontender.  RUE doppler negative   -continue with DAPT with asa and brilinta and lipitor 40  - d/c IABP yesterday     Problem/Recommendation - 2:  ·  Problem: CAD (coronary artery disease).    -patient % is chronic, patient will not be going for staging to the LAD, spoke with Dr. Lima  -d/c isordil, will consider starting BB later today if BP tolerates    Problem/Recommendation - 3:  Problem: Liver lesion.    -hepatology consulted, no further work up at this time   -the lesion is likely an angiomyolipoma, and not a malignant lesion.  Problem/Recommendation - 4:  ·  Problem: Hypertension  -BP stable at this time, will consider adding BB later tonight once balloon pump is removed. Continue isordil 10 tid  Problem/Recommendation - 5:  ·  Problem: Hyperlipidemia  -lipitor 40     Problem/Recommendation - 6:  Problem: Chronic CHF.   -patient is s/p revascularization, will initiate BB today, no need for diuretic at this time  Problem/Recommendation - 7:  Problem: RUTH (acute kidney injury). Recommendation: will continue to monitor BUN and creatinine  -avoid nephrotoxic agents, patient did receive contrast with cardiac cath.    Problem/Recommendation - 8:  Problem: Anemia. Recommendation: will continue to monitor CBC.    Problem/Recommendation - 9:  Problem: Diabetes mellitus type 2 in nonobese. Recommendation: will continue lantus   25 units qhs, (home dose is 35units) and fingersticks QAC and QHS, as well as ISS.    Problem/Recommendation - 10:  Problem: VTE (venous thromboembolism). Recommendation: heparin subQ 73 year old female with CKd stage 4, HTN, hyperlipidemia, DM-II, CHF (EF 20%) w/ ICD in left chest, and CAD with multiple PTCA/stents, most recent Feb 2019 who transfer from [facility] for NSTEMI s/p cath found to have 99% LM and 100% LAD received intra-aortic balloon pump and was transferred to CCU post cath for further management.     Also noted to have liver lesion likely to be benign angiomyolipoma, but may need biopsy in future. S/p stent to L main on 6/21 and IABP removal 6/23.       Problem/Recommendation - 1:  #NSTEMI   s/p stent to the left main via right radial artery, IABP was removed 6/23  - right radial artery site with hematoma, improving.  RUE doppler negative.  - continue with DAPT with asa 81mg daily and brilinta (ticagrelor) 90 mg BID PO and lipitor (atorvastatin) 40 mg PO qHS  - repeat CMP today    #CAD (coronary artery disease).    - patient % is chronic, patient will not be going for staging to the LAD, spoke with Dr. Lima  - d/c captopril (ACE-I) bc of increasing Cr (2/2 RUTH)  - restart metoprolol 12.5mg PO qD    #CHF:  - improving clinically though still has signs of fluid overload (JVD)   - restarted lasix PO 40mg daily  - on metoprolol 12.5mg PO qD      Problem/Recommendation - 3:  Problem: Liver lesion.    - hepatology consulted, no further work up at this time   - the lesion is likely an angiomyolipoma, and not a malignant lesion.    Problem/Recommendation - 4:  Problem: HTN, stable  - start metoprolol  - d/c captopril    Problem/Recommendation - 5:  RUTH superimposed on CKD stage 4 likely 2/2 DM/HTN/CHF   - trend is increasing to 2.1 from 1.8 6/24 (Baseline~ 1.7-1.9), less likely from contrast with cardiac cath 6/21  - Net I/O -700cc   - monitor BUN/Cr, CMP, daily phos and calcium labs  - cont monitor cmp  - d/c captopril  - avoid nephrotoxic agents    Problem/Recommendation - 7:  Problem: Anemia.   -cont. to monitor CBC.    Problem/Recommendation - 8:  Problem: Diabetes mellitus type 2 in nonobese.   -cont. lantus 25 units qhs, (home dose is 35units) and fingersticks QAC and QHS, as well as ISS.    Problem/Recommendation - 9:  Problem: VTE prophylaxis  - cont. heparin subQ 5000 units q8hr

## 2019-06-25 NOTE — PHYSICAL THERAPY INITIAL EVALUATION ADULT - ADDITIONAL COMMENTS
Pt. reports living with daughter in a private home. Pt. reports requiring a cane, and assistance for ambulation. Pt. reports having steps inside the home. Pt. also reports having a home health aide 7 hours a day, 6 days a week. Pt. returned seated in chair with all tubes/lines intact, call bell in reach and in NAD.

## 2019-06-25 NOTE — PROGRESS NOTE ADULT - SUBJECTIVE AND OBJECTIVE BOX
Patient seen and examined. She had some SOB this morning. Also BUN/Cr and LFTs higher this morning.   No CP, palpitations, dizziness. Has not ambulated yet.       Medications:  acetaminophen   Tablet .. 650 milliGRAM(s) Oral every 6 hours PRN  aspirin enteric coated 81 milliGRAM(s) Oral daily  atorvastatin 40 milliGRAM(s) Oral at bedtime  chlorhexidine 4% Liquid 1 Application(s) Topical <User Schedule>  dextrose 40% Gel 15 Gram(s) Oral once PRN  dextrose 5%. 1000 milliLiter(s) IV Continuous <Continuous>  dextrose 50% Injectable 12.5 Gram(s) IV Push once  dextrose 50% Injectable 25 Gram(s) IV Push once  docusate sodium 100 milliGRAM(s) Oral two times a day  furosemide    Tablet 40 milliGRAM(s) Oral daily  glucagon  Injectable 1 milliGRAM(s) IntraMuscular once PRN  heparin  Injectable 5000 Unit(s) SubCutaneous every 8 hours  insulin glargine Injectable (LANTUS) 30 Unit(s) SubCutaneous at bedtime  insulin lispro (HumaLOG) corrective regimen sliding scale   SubCutaneous three times a day before meals  insulin lispro (HumaLOG) corrective regimen sliding scale   SubCutaneous at bedtime  insulin lispro Injectable (HumaLOG) 3 Unit(s) SubCutaneous three times a day before meals  metoprolol succinate ER 12.5 milliGRAM(s) Oral daily  pantoprazole    Tablet 40 milliGRAM(s) Oral before breakfast  senna 2 Tablet(s) Oral at bedtime  sodium chloride 0.9% lock flush 3 milliLiter(s) IV Push every 8 hours  ticagrelor 90 milliGRAM(s) Oral every 12 hours      Vitals:  Vital Signs Last 24 Hrs  T(C): 36.5 (2019 12:00), Max: 36.8 (2019 04:00)  T(F): 97.7 (2019 12:00), Max: 98.3 (2019 04:00)  HR: 84 (2019 13:00) (74 - 101)  BP: 114/53 (2019 13:00) (89/40 - 119/52)  BP(mean): 69 (2019 13:00) (47 - 87)  RR: 16 (2019 13:00) (12 - 22)  SpO2: 100% (2019 08:00) (98% - 100%)    Daily     Daily Weight in k.6 (2019 05:00)    I&O's Detail    2019 07:  -  2019 07:00  --------------------------------------------------------  IN:    Oral Fluid: 100 mL  Total IN: 100 mL    OUT:    Voided: 800 mL  Total OUT: 800 mL    Total NET: -700 mL      2019 07:  -  2019 13:23  --------------------------------------------------------  IN:    Oral Fluid: 120 mL  Total IN: 120 mL    OUT:    Voided: 300 mL  Total OUT: 300 mL    Total NET: -180 mL          Physical Exam:     General: No distress. Comfortable.  HEENT: EOM intact.  Neck: Neck supple. JVP not elevated. No masses  Chest: Clear to auscultation bilaterally  CV: S1 and S2 RRR. No murmurs, rub, or gallops. Radial pulses normal. No LE edema b/l. Warm b/l.   Abdomen: Soft, non-distended, non-tender  Skin: No rashes or skin breakdown  Neurology: Alert and oriented times three. Sensation intact  Psych: Affect normal    Labs:                        9.0    10.49 )-----------( 205      ( 2019 06:00 )             28.3     06    138  |  101  |  49<H>  ----------------------------<  167<H>  3.9   |  21<L>  |  2.10<H>    Ca    9.5      2019 06:00  Phos  4.2     0625  Mg     2.3         TPro  7.8  /  Alb  3.5  /  TBili  0.3  /  DBili  x   /  AST  64<H>  /  ALT  43<H>  /  AlkPhos  123<H>      PT/INR - ( 2019 06:00 )   PT: 12.0 SEC;   INR: 1.08          PTT - ( 2019 06:00 )  PTT:91.2 SEC

## 2019-06-25 NOTE — PHYSICAL THERAPY INITIAL EVALUATION ADULT - DIAGNOSIS, PT EVAL
Decreased endurance, decreased strength, decreased balance, decreased postural control, all limiting functional mobility.

## 2019-06-25 NOTE — PROGRESS NOTE ADULT - SUBJECTIVE AND OBJECTIVE BOX
PATIENT:  VINICIO DEY  [id number]    CHIEF COMPLAINT:  Patient is a 73y old  Female who presents with a chief complaint of Chest pains (2019 15:18)      INTERVAL HISTORY/OVERNIGHT EVENTS:    REVIEW OF SYSTEMS:    Constitutional:     [ ] negative [ ] fevers [ ] chills [ ] weight loss [ ] weight gain  HEENT:                  [ ] negative [ ] dry eyes [ ] eye irritation [ ] postnasal drip [ ] nasal congestion  CV:                         [ ] negative  [ ] chest pain [ ] orthopnea [ ] palpitations [ ] murmur  Resp:                     [ ] negative [ ] cough [ ] shortness of breath [ ] dyspnea [ ] wheezing [ ] sputum [ ] hemoptysis  GI:                          [ ] negative [ ] nausea [ ] vomiting [ ] diarrhea [ ] constipation [ ] abd pain [ ] dysphagia   :                        [ ] negative [ ] dysuria [ ] nocturia [ ] hematuria [ ] increased urinary frequency  Musculoskeletal: [ ] negative [ ] back pain [ ] myalgias [ ] arthralgias [ ] fracture  Skin:                       [ ] negative [ ] rash [ ] itch  Neurological:        [ ] negative [ ] headache [ ] dizziness [ ] syncope [ ] weakness [ ] numbness  Psychiatric:           [ ] negative [ ] anxiety [ ] depression  Endocrine:            [ ] negative [ ] diabetes [ ] thyroid problem  Heme/Lymph:      [ ] negative [ ] anemia [ ] bleeding problem  Allergic/Immune: [ ] negative [ ] itchy eyes [ ] nasal discharge [ ] hives [ ] angioedema    [ ] All other systems negative  [ ] Unable to assess ROS because ________.    MEDICATIONS:  MEDICATIONS  (STANDING):  aspirin enteric coated 81 milliGRAM(s) Oral daily  atorvastatin 40 milliGRAM(s) Oral at bedtime  captopril 6.25 milliGRAM(s) Oral three times a day  chlorhexidine 4% Liquid 1 Application(s) Topical <User Schedule>  dextrose 5%. 1000 milliLiter(s) (50 mL/Hr) IV Continuous <Continuous>  dextrose 50% Injectable 12.5 Gram(s) IV Push once  dextrose 50% Injectable 25 Gram(s) IV Push once  docusate sodium 100 milliGRAM(s) Oral two times a day  heparin  Injectable 5000 Unit(s) SubCutaneous every 8 hours  insulin glargine Injectable (LANTUS) 30 Unit(s) SubCutaneous at bedtime  insulin lispro (HumaLOG) corrective regimen sliding scale   SubCutaneous three times a day before meals  insulin lispro (HumaLOG) corrective regimen sliding scale   SubCutaneous at bedtime  insulin lispro Injectable (HumaLOG) 3 Unit(s) SubCutaneous three times a day before meals  metoprolol succinate ER 12.5 milliGRAM(s) Oral daily  pantoprazole    Tablet 40 milliGRAM(s) Oral before breakfast  senna 2 Tablet(s) Oral at bedtime  sodium chloride 0.9% lock flush 3 milliLiter(s) IV Push every 8 hours  ticagrelor 90 milliGRAM(s) Oral every 12 hours    MEDICATIONS  (PRN):  acetaminophen   Tablet .. 650 milliGRAM(s) Oral every 6 hours PRN Mild Pain (1 - 3), Moderate Pain (4 - 6), Severe Pain (7 - 10)  dextrose 40% Gel 15 Gram(s) Oral once PRN Blood Glucose LESS THAN 70 milliGRAM(s)/deciliter  glucagon  Injectable 1 milliGRAM(s) IntraMuscular once PRN Glucose LESS THAN 70 milligrams/deciliter      ALLERGIES:  Allergies    No Known Allergies    Intolerances        OBJECTIVE:  ICU Vital Signs Last 24 Hrs  T(C): 36.8 (2019 04:00), Max: 36.8 (2019 04:00)  T(F): 98.3 (2019 04:00), Max: 98.3 (2019 04:00)  HR: 87 (2019 06:00) (79 - 101)  BP: 109/47 (2019 06:00) (94/31 - 119/52)  BP(mean): 61 (2019 06:00) (47 - 86)  ABP: --  ABP(mean): --  RR: 13 (2019 06:00) (12 - 22)  SpO2: 99% (2019 06:00) (98% - 100%)      Adult Advanced Hemodynamics Last 24 Hrs  CVP(mm Hg): --  CVP(cm H2O): --  CO: --  CI: --  PA: --  PA(mean): --  PCWP: --  SVR: --  SVRI: --  PVR: --  PVRI: --  CAPILLARY BLOOD GLUCOSE      POCT Blood Glucose.: 282 mg/dL (2019 21:06)  POCT Blood Glucose.: 292 mg/dL (2019 17:11)  POCT Blood Glucose.: 221 mg/dL (2019 11:50)  POCT Blood Glucose.: 156 mg/dL (2019 07:57)    CAPILLARY BLOOD GLUCOSE      POCT Blood Glucose.: 282 mg/dL (2019 21:06)    I&O's Summary    2019 07:01  -  2019 07:00  --------------------------------------------------------  IN: 100 mL / OUT: 800 mL / NET: -700 mL      Daily     Daily Weight in k.6 (2019 05:00)    PHYSICAL EXAMINATION:  General: WN/WD NAD  HEENT: PERRLA, EOMI, moist mucous membranes  Neurology: A&Ox3, nonfocal, MARSH x 4  Respiratory: CTA B/L, normal respiratory effort, no wheezes, crackles, rales  CV: RRR, S1S2, no murmurs, rubs or gallops  Abdominal: Soft, NT, ND +BS, Last BM  Extremities: No edema, + peripheral pulses  Incisions:   Tubes:    LABS:                          9.0    10.49 )-----------( 205      ( 2019 06:00 )             28.3         138  |  101  |  49<H>  ----------------------------<  167<H>  3.9   |  21<L>  |  2.10<H>    Ca    9.5      2019 06:00  Phos  4.2       Mg     2.3         TPro  7.8  /  Alb  3.5  /  TBili  0.3  /  DBili  x   /  AST  64<H>  /  ALT  43<H>  /  AlkPhos  123<H>      LIVER FUNCTIONS - ( 2019 06:00 )  Alb: 3.5 g/dL / Pro: 7.8 g/dL / ALK PHOS: 123 u/L / ALT: 43 u/L / AST: 64 u/L / GGT: x           PT/INR - ( 2019 06:00 )   PT: 12.0 SEC;   INR: 1.08          PTT - ( 2019 06:00 )  PTT:91.2 SEC            TELEMETRY:     EKG:     IMAGING:    Flores Huerta  Internal Medicine PGY-1  Pager: [phone] Flores Huerta  Internal Medicine PGY-1  Pager: [phone]    PATIENT:  VINICIO DEY  [id number]    INTERVAL HISTORY/OVERNIGHT EVENTS:  - This morning (6am) experienced sharp LUQ/L breast pain that correlated to lightheadedness and 10 beats of VTach on tele.  - Otherwise, doing well and no other complaints.   - Last BM was yesterday morning.    CHIEF COMPLAINT:  Patient is a 73y old  Female with CKd stage 4, HTN, hyperlipidemia, DM-II, CHF (EF 20%) w/ ICD in left chest, and CAD with multiple PTCA/stents, most recent 2019 who transfer from [facility] for NSTEMI s/p cath found to have 99% LM and 100% LAD received intra-aortic balloon pump and was transferred to CCU post cath for further management.     In the CCU, she had a cardiac catheterization () with left main stent placed; LAD was found to be chronically occluded and had re-stenosed. Had IABP placed and removed on . Is currently being managed for s/p catheterization and medical optimization.    Also of note, she has liver lesion who presents with a chief complaint of Chest pains (2019 15:18).    MEDICATIONS:  MEDICATIONS  (STANDING):  aspirin enteric coated 81 milliGRAM(s) Oral daily  atorvastatin 40 milliGRAM(s) Oral at bedtime  captopril 6.25 milliGRAM(s) Oral three times a day  chlorhexidine 4% Liquid 1 Application(s) Topical <User Schedule>  dextrose 5%. 1000 milliLiter(s) (50 mL/Hr) IV Continuous <Continuous>  dextrose 50% Injectable 12.5 Gram(s) IV Push once  dextrose 50% Injectable 25 Gram(s) IV Push once  docusate sodium 100 milliGRAM(s) Oral two times a day  heparin  Injectable 5000 Unit(s) SubCutaneous every 8 hours  insulin glargine Injectable (LANTUS) 30 Unit(s) SubCutaneous at bedtime  insulin lispro (HumaLOG) corrective regimen sliding scale   SubCutaneous three times a day before meals  insulin lispro (HumaLOG) corrective regimen sliding scale   SubCutaneous at bedtime  insulin lispro Injectable (HumaLOG) 3 Unit(s) SubCutaneous three times a day before meals  metoprolol succinate ER 12.5 milliGRAM(s) Oral daily  pantoprazole    Tablet 40 milliGRAM(s) Oral before breakfast  senna 2 Tablet(s) Oral at bedtime  sodium chloride 0.9% lock flush 3 milliLiter(s) IV Push every 8 hours  ticagrelor 90 milliGRAM(s) Oral every 12 hours    MEDICATIONS  (PRN):  acetaminophen   Tablet .. 650 milliGRAM(s) Oral every 6 hours PRN Mild Pain (1 - 3), Moderate Pain (4 - 6), Severe Pain (7 - 10)  dextrose 40% Gel 15 Gram(s) Oral once PRN Blood Glucose LESS THAN 70 milliGRAM(s)/deciliter  glucagon  Injectable 1 milliGRAM(s) IntraMuscular once PRN Glucose LESS THAN 70 milligrams/deciliter      ALLERGIES:  Allergies    No Known Allergies    Intolerances    OBJECTIVE:  ICU Vital Signs Last 24 Hrs  T(C): 36.8 (2019 04:00), Max: 36.8 (2019 04:00)  T(F): 98.3 (2019 04:00), Max: 98.3 (2019 04:00)  HR: 87 (2019 06:00) (79 - 101)  BP: 109/47 (2019 06:00) (94/31 - 119/52)  BP(mean): 61 (2019 06:00) (47 - 86)  ABP: --  ABP(mean): --  RR: 13 (2019 06:00) (12 - 22)  SpO2: 99% (2019 06:00) (98% - 100%)      POCT Blood Glucose.: 282 mg/dL (2019 21:06)  POCT Blood Glucose.: 292 mg/dL (2019 17:11)  POCT Blood Glucose.: 221 mg/dL (2019 11:50)  POCT Blood Glucose.: 156 mg/dL (2019 07:57)    CAPILLARY BLOOD GLUCOSE      POCT Blood Glucose.: 282 mg/dL (2019 21:06)    I&O's Summary    2019 07:01  -  2019 07:00  --------------------------------------------------------  IN: 100 mL / OUT: 800 mL / NET: -700 mL      Daily     Daily Weight in k.6 (2019 05:00)    PHYSICAL EXAMINATION:  General: WN/WD NAD  HEENT: PERRLA, EOMI  Neurology: A&Ox3, nonfocal  Respiratory: CTA B/L, normal respiratory effort  CV: RRR, S1S2, JVD on exam (8cm)  Abdominal: Soft, NT, ND +BS, Last BM yesterday am  Extremities: No edema, + peripheral pulses  Skin:  right radial artery site with hematoma, improving and nontender    LABS:                          9.0    10.49 )-----------( 205      ( 2019 06:00 )             28.3         138  |  101  |  49<H>  ----------------------------<  167<H>  3.9   |  21<L>  |  2.10<H>    Ca    9.5      2019 06:00  Phos  4.2       Mg     2.3         TPro  7.8  /  Alb  3.5  /  TBili  0.3  /  DBili  x   /  AST  64<H>  /  ALT  43<H>  /  AlkPhos  123<H>      LIVER FUNCTIONS - ( 2019 06:00 )  Alb: 3.5 g/dL / Pro: 7.8 g/dL / ALK PHOS: 123 u/L / ALT: 43 u/L / AST: 64 u/L / GGT: x           PT/INR - ( 2019 06:00 )   PT: 12.0 SEC;   INR: 1.08          PTT - ( 2019 06:00 )  PTT:91.2 SEC        TELEMETRY: 10 beats of VT at ~6am today ()    EKG: normal sinus    IMAGING:    Echo doppler RUE (19)  Summary/Impressions:  No evidence of occlusive arterial disease in the right  upper extremity.    TTE ():  CONCLUSIONS:  1. Mitral annular calcification, and calcified mitral  leaflets with normal diastolic opening. Tethered mitral  valve leaflets. Moderate mitral regurgitation.  2.  Trace aortic regurgitation.  3. Mild left ventricular enlargement.  4. Severe segmental left ventricular systolic dysfunction.  The entire anterior wall, anteroseptum and apex are  akinetic.  5. Normal right ventricular size and function. A device  lead is visualized in the right heart.  6. RV systolic pressure is 68 mm Hg. Severe pulmonary  hypertension.

## 2019-06-25 NOTE — PHYSICAL THERAPY INITIAL EVALUATION ADULT - GAIT DEVIATIONS NOTED, PT EVAL
decreased step length/decreased janell/decreased weight-shifting ability/increased time in double stance

## 2019-06-25 NOTE — PHYSICAL THERAPY INITIAL EVALUATION ADULT - PATIENT PROFILE REVIEW, REHAB EVAL
yes/Consulted RN. Yumi MURPHY, advised appropriate to have physical therapy session. Activity order, ambulate as tolerated.

## 2019-06-25 NOTE — PROGRESS NOTE ADULT - SUBJECTIVE AND OBJECTIVE BOX
[facility]   MD YENI MARTÍNEZ MD ANGELA WONG, PA    TEL:  OFFICE: [phone]  DR LEUNG CELL: [phone]  DR. LUNA CELL: [phone]  JAJA VELASQUEZ CELL: [phone]        Patient is a 73y old  Female who presents with a chief complaint of Chest pains (23 Jun 2019 15:18)      Patient seen and examined at bedside. No chest pain/sob    VITALS:  T(F): 97.7 (06-25-19 @ 12:00), Max: 98.3 (06-25-19 @ 04:00)  HR: 76 (06-25-19 @ 11:00)  BP: 97/48 (06-25-19 @ 11:00)  RR: 16 (06-25-19 @ 11:00)  SpO2: 100% (06-25-19 @ 08:00)  Wt(kg): --    06-24 @ 07:01  -  06-25 @ 07:00  --------------------------------------------------------  IN: 100 mL / OUT: 800 mL / NET: -700 mL    06-25 @ 07:01  -  06-25 @ 12:27  --------------------------------------------------------  IN: 120 mL / OUT: 300 mL / NET: -180 mL          PHYSICAL EXAM:  Constitutional: NAD  Neck: No JVD  Respiratory: CTAB, no wheezes, rales or rhonchi  Cardiovascular: S1, S2, RRR  Gastrointestinal: BS+, soft, NT/ND  Extremities: No peripheral edema    Hospital Medications:   MEDICATIONS  (STANDING):  aspirin enteric coated 81 milliGRAM(s) Oral daily  atorvastatin 40 milliGRAM(s) Oral at bedtime  chlorhexidine 4% Liquid 1 Application(s) Topical <User Schedule>  dextrose 5%. 1000 milliLiter(s) (50 mL/Hr) IV Continuous <Continuous>  dextrose 50% Injectable 12.5 Gram(s) IV Push once  dextrose 50% Injectable 25 Gram(s) IV Push once  docusate sodium 100 milliGRAM(s) Oral two times a day  furosemide    Tablet 40 milliGRAM(s) Oral daily  heparin  Injectable 5000 Unit(s) SubCutaneous every 8 hours  insulin glargine Injectable (LANTUS) 30 Unit(s) SubCutaneous at bedtime  insulin lispro (HumaLOG) corrective regimen sliding scale   SubCutaneous three times a day before meals  insulin lispro (HumaLOG) corrective regimen sliding scale   SubCutaneous at bedtime  insulin lispro Injectable (HumaLOG) 3 Unit(s) SubCutaneous three times a day before meals  metoprolol succinate ER 12.5 milliGRAM(s) Oral daily  pantoprazole    Tablet 40 milliGRAM(s) Oral before breakfast  senna 2 Tablet(s) Oral at bedtime  sodium chloride 0.9% lock flush 3 milliLiter(s) IV Push every 8 hours  ticagrelor 90 milliGRAM(s) Oral every 12 hours      LABS:  06-25    138  |  101  |  49<H>  ----------------------------<  167<H>  3.9   |  21<L>  |  2.10<H>    Ca    9.5      25 Jun 2019 06:00  Phos  4.2     06-25  Mg     2.3     06-25    TPro  7.8  /  Alb  3.5  /  TBili  0.3  /  DBili      /  AST  64<H>  /  ALT  43<H>  /  AlkPhos  123<H>  06-25    Creatinine Trend: 2.10 <--, 1.88 <--, 1.75 <--, 1.79 <--, 1.99 <--, 1.84 <--, 1.78 <--, 2.19 <--    Albumin, Serum: 3.5 g/dL (06-25 @ 06:00)  Phosphorus Level, Serum: 4.2 mg/dL (06-25 @ 06:00)                              9.0    10.49 )-----------( 205      ( 25 Jun 2019 06:00 )             28.3     Urine Studies:      HbA1c 15.1      [06-15-19 @ 12:17]  TSH 1.66      [06-15-19 @ 07:30]  Lipid: chol 118, , HDL 29, LDL 56      [06-15-19 @ 07:30]    HBsAb Reactive      [06-19-19 @ 19:32]  HBsAg NEGATIVE      [06-20-19 @ 12:00]  HBcAb Reactive      [06-19-19 @ 19:32]  HCV 0.09, Nonreact      [06-15-19 @ 12:23]      RADIOLOGY & ADDITIONAL STUDIES:

## 2019-06-25 NOTE — PHYSICAL THERAPY INITIAL EVALUATION ADULT - PERTINENT HX OF CURRENT PROBLEM, REHAB EVAL
Pt. is a 73 year old female, admitted to [facility], presenting with a NSTEMI. PMH: CHF, CAD, DM, HTN, Anemia, stented coronary artery.

## 2019-06-25 NOTE — PROGRESS NOTE ADULT - ASSESSMENT
72 y/o HTN, HLD, DM II, HFrEF (20%), MI 8 years ago, CAD w/ stents (last in feb 2019), ICD, CKD. Patient has had numerous hospitlizations, 4 this year alone for CP and SOB (at [facility], [facility], [facility]). She initially presented to [facility] for CP, abodminal pain and SOB. She was found to have NSTEMI and was sent to [facility] for possible PCI. She had a LHC 6/19 which showed LM 95% stenosis, prox %, prox circ 20% at prior stent, OM I 30% at prior stent, mid RCA 30% prior stent.  RHC RA 13, PA 66/29/43, PCWP 28, PA sat 39%, CO 2.85, CI 1.78, PVR 5.27, SVR 1770. Pt admits to [facility] after a few steps for many months, also had PND, orthopnea. Currently she is feeling a lot better, no SOB at rest.

## 2019-06-25 NOTE — PROGRESS NOTE ADULT - ATTENDING COMMENTS
Patient seen and examined  Agree with above assessment and plan   10 beats NSVT overnight K and Mg ok  1. CAD- SP PCI- on Asa and brilinta  2. Acute on chronic systolic heart failure-  Captopril on hold (Cr increased)  Cont toprol 12.5 mg daily- particularly given NSVT  HF recommendations appreciated

## 2019-06-25 NOTE — PROGRESS NOTE ADULT - ATTENDING COMMENTS
Agree with PO Lasix.  Had VT this AM.  Would increase Toprol to 12.5 bid.  Would start hydralazine/nitrate combination tomorrow if BP can tolerate.

## 2019-06-25 NOTE — PROGRESS NOTE ADULT - ASSESSMENT
73 year old female with CKd stage 4, HTN, hyperlipidemia, DM-II, CHF (EF 20%) w/ ICD in left chest, and CAD with multiple PTCA/stents, most recent Feb 2019 who transfer from [facility] for NSTEMI s/p cath found to have 99% LM and 100% LAD received intra-aortic balloon pump and was transferred to CCU post cath for further management. also noted to have liver lesion likely need biopsy     CKD stage 4  baseline ~ 1.7-1.9 She remains non-Oliguric.  CKD likely sec to DM/HTN/CHF  renal function slightly worsen today likely mild ATN sec to hemodynamic changes  Continue to monitor bmp, monitor I/O  avoid nephrotoxic agents  monitor bmp      HTN  controlled  monitor BP    proteinuria  check urine p/c ratio  likely sec to DM  vasculitis work up ordered outpatient but never done.     NSTEMI  f/u cardio    CKD-MBD  check PTH, phos level  monitor phos and calcium daily

## 2019-06-26 LAB
ALBUMIN SERPL ELPH-MCNC: 3.2 G/DL — LOW (ref 3.3–5)
ALP SERPL-CCNC: 106 U/L — SIGNIFICANT CHANGE UP (ref 40–120)
ALT FLD-CCNC: 35 U/L — HIGH (ref 4–33)
ANION GAP SERPL CALC-SCNC: 14 MMO/L — SIGNIFICANT CHANGE UP (ref 7–14)
AST SERPL-CCNC: 36 U/L — HIGH (ref 4–32)
BILIRUB DIRECT SERPL-MCNC: < 0.2 MG/DL — SIGNIFICANT CHANGE UP (ref 0.1–0.2)
BILIRUB SERPL-MCNC: 0.3 MG/DL — SIGNIFICANT CHANGE UP (ref 0.2–1.2)
BUN SERPL-MCNC: 51 MG/DL — HIGH (ref 7–23)
CALCIUM SERPL-MCNC: 9.3 MG/DL — SIGNIFICANT CHANGE UP (ref 8.4–10.5)
CHLORIDE SERPL-SCNC: 102 MMOL/L — SIGNIFICANT CHANGE UP (ref 98–107)
CO2 SERPL-SCNC: 23 MMOL/L — SIGNIFICANT CHANGE UP (ref 22–31)
CREAT SERPL-MCNC: 1.86 MG/DL — HIGH (ref 0.5–1.3)
GLUCOSE BLDC GLUCOMTR-MCNC: 126 MG/DL — HIGH (ref 70–99)
GLUCOSE BLDC GLUCOMTR-MCNC: 191 MG/DL — HIGH (ref 70–99)
GLUCOSE BLDC GLUCOMTR-MCNC: 216 MG/DL — HIGH (ref 70–99)
GLUCOSE BLDC GLUCOMTR-MCNC: 95 MG/DL — SIGNIFICANT CHANGE UP (ref 70–99)
GLUCOSE SERPL-MCNC: 117 MG/DL — HIGH (ref 70–99)
HCT VFR BLD CALC: 26.5 % — LOW (ref 34.5–45)
HGB BLD-MCNC: 8.5 G/DL — LOW (ref 11.5–15.5)
MAGNESIUM SERPL-MCNC: 2.1 MG/DL — SIGNIFICANT CHANGE UP (ref 1.6–2.6)
MCHC RBC-ENTMCNC: 26.2 PG — LOW (ref 27–34)
MCHC RBC-ENTMCNC: 32.1 % — SIGNIFICANT CHANGE UP (ref 32–36)
MCV RBC AUTO: 81.5 FL — SIGNIFICANT CHANGE UP (ref 80–100)
NRBC # FLD: 0 K/UL — SIGNIFICANT CHANGE UP (ref 0–0)
PHOSPHATE SERPL-MCNC: 4.2 MG/DL — SIGNIFICANT CHANGE UP (ref 2.5–4.5)
PLATELET # BLD AUTO: 227 K/UL — SIGNIFICANT CHANGE UP (ref 150–400)
PMV BLD: 11.2 FL — SIGNIFICANT CHANGE UP (ref 7–13)
POTASSIUM SERPL-MCNC: 4.5 MMOL/L — SIGNIFICANT CHANGE UP (ref 3.5–5.3)
POTASSIUM SERPL-SCNC: 4.5 MMOL/L — SIGNIFICANT CHANGE UP (ref 3.5–5.3)
PROT SERPL-MCNC: 7.2 G/DL — SIGNIFICANT CHANGE UP (ref 6–8.3)
RBC # BLD: 3.25 M/UL — LOW (ref 3.8–5.2)
RBC # FLD: 14.2 % — SIGNIFICANT CHANGE UP (ref 10.3–14.5)
SODIUM SERPL-SCNC: 139 MMOL/L — SIGNIFICANT CHANGE UP (ref 135–145)
WBC # BLD: 10.67 K/UL — HIGH (ref 3.8–10.5)
WBC # FLD AUTO: 10.67 K/UL — HIGH (ref 3.8–10.5)

## 2019-06-26 PROCEDURE — 99233 SBSQ HOSP IP/OBS HIGH 50: CPT | Mod: GC

## 2019-06-26 PROCEDURE — 99233 SBSQ HOSP IP/OBS HIGH 50: CPT

## 2019-06-26 RX ORDER — ISOSORBIDE DINITRATE 5 MG/1
5 TABLET ORAL THREE TIMES A DAY
Refills: 0 | Status: DISCONTINUED | OUTPATIENT
Start: 2019-06-26 | End: 2019-06-29

## 2019-06-26 RX ORDER — HYDRALAZINE HCL 50 MG
5 TABLET ORAL EVERY 8 HOURS
Refills: 0 | Status: DISCONTINUED | OUTPATIENT
Start: 2019-06-26 | End: 2019-06-29

## 2019-06-26 RX ADMIN — SENNA PLUS 2 TABLET(S): 8.6 TABLET ORAL at 21:29

## 2019-06-26 RX ADMIN — ISOSORBIDE DINITRATE 5 MILLIGRAM(S): 5 TABLET ORAL at 21:29

## 2019-06-26 RX ADMIN — HEPARIN SODIUM 5000 UNIT(S): 5000 INJECTION INTRAVENOUS; SUBCUTANEOUS at 21:27

## 2019-06-26 RX ADMIN — ATORVASTATIN CALCIUM 40 MILLIGRAM(S): 80 TABLET, FILM COATED ORAL at 21:27

## 2019-06-26 RX ADMIN — Medication 650 MILLIGRAM(S): at 20:27

## 2019-06-26 RX ADMIN — Medication 5 MILLIGRAM(S): at 21:28

## 2019-06-26 RX ADMIN — Medication 3 UNIT(S): at 17:08

## 2019-06-26 RX ADMIN — Medication 650 MILLIGRAM(S): at 20:57

## 2019-06-26 RX ADMIN — TICAGRELOR 90 MILLIGRAM(S): 90 TABLET ORAL at 20:27

## 2019-06-26 RX ADMIN — HEPARIN SODIUM 5000 UNIT(S): 5000 INJECTION INTRAVENOUS; SUBCUTANEOUS at 14:17

## 2019-06-26 RX ADMIN — Medication 4: at 17:08

## 2019-06-26 RX ADMIN — CHLORHEXIDINE GLUCONATE 1 APPLICATION(S): 213 SOLUTION TOPICAL at 09:23

## 2019-06-26 RX ADMIN — PANTOPRAZOLE SODIUM 40 MILLIGRAM(S): 20 TABLET, DELAYED RELEASE ORAL at 08:14

## 2019-06-26 RX ADMIN — Medication 100 MILLIGRAM(S): at 05:42

## 2019-06-26 RX ADMIN — Medication 5 MILLIGRAM(S): at 11:48

## 2019-06-26 RX ADMIN — INSULIN GLARGINE 30 UNIT(S): 100 INJECTION, SOLUTION SUBCUTANEOUS at 21:28

## 2019-06-26 RX ADMIN — Medication 100 MILLIGRAM(S): at 17:12

## 2019-06-26 RX ADMIN — Medication 12.5 MILLIGRAM(S): at 05:42

## 2019-06-26 RX ADMIN — Medication 12.5 MILLIGRAM(S): at 17:11

## 2019-06-26 RX ADMIN — Medication 5 MILLIGRAM(S): at 17:12

## 2019-06-26 RX ADMIN — Medication 81 MILLIGRAM(S): at 11:48

## 2019-06-26 RX ADMIN — Medication 3 UNIT(S): at 08:13

## 2019-06-26 RX ADMIN — Medication 3 UNIT(S): at 11:48

## 2019-06-26 RX ADMIN — TICAGRELOR 90 MILLIGRAM(S): 90 TABLET ORAL at 09:23

## 2019-06-26 RX ADMIN — SODIUM CHLORIDE 3 MILLILITER(S): 9 INJECTION INTRAMUSCULAR; INTRAVENOUS; SUBCUTANEOUS at 22:04

## 2019-06-26 RX ADMIN — Medication 40 MILLIGRAM(S): at 05:48

## 2019-06-26 RX ADMIN — SODIUM CHLORIDE 3 MILLILITER(S): 9 INJECTION INTRAMUSCULAR; INTRAVENOUS; SUBCUTANEOUS at 15:41

## 2019-06-26 RX ADMIN — SODIUM CHLORIDE 3 MILLILITER(S): 9 INJECTION INTRAMUSCULAR; INTRAVENOUS; SUBCUTANEOUS at 06:02

## 2019-06-26 RX ADMIN — HEPARIN SODIUM 5000 UNIT(S): 5000 INJECTION INTRAVENOUS; SUBCUTANEOUS at 05:43

## 2019-06-26 NOTE — PROGRESS NOTE ADULT - ATTENDING COMMENTS
Patient seen and examined  Patient with CAD and NSTEMI and acute on chronic systolic heart failure  Slowly improving  On Toprol 25 mg daily  Will start Hydralazine 5 mg TID with goal to start Isordil if BP allows  Cont Lasix 40 mg daily

## 2019-06-26 NOTE — PROGRESS NOTE ADULT - ASSESSMENT
73 year old female with CKd stage 4, HTN, hyperlipidemia, DM-II, CHF (EF 20%) w/ ICD in left chest, and CAD with multiple PTCA/stents, most recent Feb 2019 who transfer from [facility] for NSTEMI s/p cath found to have 99% LM and 100% LAD received intra-aortic balloon pump and was transferred to CCU post cath for further management. also noted to have liver lesion likely need biopsy     CKD stage 4  baseline ~ 1.7-1.9 She remains non-Oliguric.  CKD likely sec to DM/HTN/CHF  renal function relatively stable today  Continue to monitor bmp, monitor I/O  avoid nephrotoxic agents  monitor bmp      HTN  controlled  monitor BP    proteinuria  check urine p/c ratio  likely sec to DM  vasculitis work up ordered outpatient but never done.     NSTEMI  f/u cardio    CKD-MBD  check PTH, phos level  monitor phos and calcium daily

## 2019-06-26 NOTE — PROGRESS NOTE ADULT - SUBJECTIVE AND OBJECTIVE BOX
[facility]   MD YENI MARTÍNEZ MD ANGELA WONG, PA    TEL:  OFFICE: [phone]  DR LEUNG CELL: [phone]  DR. LUNA CELL: [phone]  JAJA VELASQUEZ CELL: [phone]        Patient is a 73y old  Female who presents with a chief complaint of Chest pains (23 Jun 2019 15:18)      Patient seen and examined at bedside. still have on and off mild chest pain    VITALS:  T(F): 98 (06-26-19 @ 07:39), Max: 98.3 (06-26-19 @ 05:00)  HR: 76 (06-26-19 @ 13:00)  BP: 115/43 (06-26-19 @ 13:00)  RR: 20 (06-26-19 @ 13:00)  SpO2: 100% (06-26-19 @ 13:00)  Wt(kg): --    06-25 @ 07:01  -  06-26 @ 07:00  --------------------------------------------------------  IN: 360 mL / OUT: 1380 mL / NET: -1020 mL    06-26 @ 07:01  -  06-26 @ 13:30  --------------------------------------------------------  IN: 260 mL / OUT: 640 mL / NET: -380 mL          PHYSICAL EXAM:  Constitutional: NAD  Neck: No JVD  Respiratory: CTAB, no wheezes, rales or rhonchi  Cardiovascular: S1, S2, RRR  Gastrointestinal: BS+, soft, NT/ND  Extremities: No peripheral edema    Hospital Medications:   MEDICATIONS  (STANDING):  aspirin enteric coated 81 milliGRAM(s) Oral daily  atorvastatin 40 milliGRAM(s) Oral at bedtime  chlorhexidine 4% Liquid 1 Application(s) Topical <User Schedule>  dextrose 5%. 1000 milliLiter(s) (50 mL/Hr) IV Continuous <Continuous>  dextrose 50% Injectable 12.5 Gram(s) IV Push once  dextrose 50% Injectable 25 Gram(s) IV Push once  docusate sodium 100 milliGRAM(s) Oral two times a day  furosemide    Tablet 40 milliGRAM(s) Oral daily  heparin  Injectable 5000 Unit(s) SubCutaneous every 8 hours  hydrALAZINE 5 milliGRAM(s) Oral every 8 hours  insulin glargine Injectable (LANTUS) 30 Unit(s) SubCutaneous at bedtime  insulin lispro (HumaLOG) corrective regimen sliding scale   SubCutaneous three times a day before meals  insulin lispro (HumaLOG) corrective regimen sliding scale   SubCutaneous at bedtime  insulin lispro Injectable (HumaLOG) 3 Unit(s) SubCutaneous three times a day before meals  metoprolol succinate ER 12.5 milliGRAM(s) Oral every 12 hours  pantoprazole    Tablet 40 milliGRAM(s) Oral before breakfast  senna 2 Tablet(s) Oral at bedtime  sodium chloride 0.9% lock flush 3 milliLiter(s) IV Push every 8 hours  ticagrelor 90 milliGRAM(s) Oral every 12 hours      LABS:  06-26    139  |  102  |  51<H>  ----------------------------<  117<H>  4.5   |  23  |  1.86<H>    Ca    9.3      26 Jun 2019 06:00  Phos  4.2     06-26  Mg     2.1     06-26    TPro  7.2  /  Alb  3.2<L>  /  TBili  0.3  /  DBili  < 0.2  /  AST  36<H>  /  ALT  35<H>  /  AlkPhos  106  06-26    Creatinine Trend: 1.86 <--, 1.93 <--, 2.10 <--, 1.88 <--, 1.75 <--, 1.79 <--, 1.99 <--, 1.84 <--, 1.78 <--    Phosphorus Level, Serum: 4.2 mg/dL (06-26 @ 06:00)  Albumin, Serum: 3.2 g/dL (06-26 @ 06:00)  Phosphorus Level, Serum: 5.0 mg/dL (06-25 @ 15:30)  Albumin, Serum: 3.5 g/dL (06-25 @ 15:30)                              8.5    10.67 )-----------( 227      ( 26 Jun 2019 06:00 )             26.5     Urine Studies:      HbA1c 15.1      [06-15-19 @ 12:17]  TSH 1.66      [06-15-19 @ 07:30]  Lipid: chol 118, , HDL 29, LDL 56      [06-15-19 @ 07:30]    HBsAb Reactive      [06-19-19 @ 19:32]  HBsAg NEGATIVE      [06-20-19 @ 12:00]  HBcAb Reactive      [06-19-19 @ 19:32]  HCV 0.09, Nonreact      [06-15-19 @ 12:23]      RADIOLOGY & ADDITIONAL STUDIES:

## 2019-06-26 NOTE — PROGRESS NOTE ADULT - ASSESSMENT
73 year old female with CKd stage 4, HTN, hyperlipidemia, DM-II, CHF (EF 20%) w/ ICD in left chest, and CAD with multiple PTCA/stents, most recent Feb 2019 who transfer from [facility] for NSTEMI s/p cath found to have 99% LM and 100% LAD received intra-aortic balloon pump and was transferred to CCU post cath for further management. also noted to have liver lesion likely need biopsy. S/p stent to L main on 6/21 and IABP removal 6/23.     ******    Problem/Recommendation - 1:  Problem: NSTEMI (non-ST elevated myocardial infarction).   s/p stent to the left main via right radial artery  right radial artery site with hematoma, now decreased in size, nontender.  RUE doppler negative   -continue with DAPT with asa and brilinta and lipitor 40  - d/c IABP yesterday     Problem/Recommendation - 2:  ·  Problem: CAD (coronary artery disease).    -patient % is chronic, patient will not be going for staging to the LAD, spoke with Dr. Lima  -d/c isordil, will consider starting BB later today if BP tolerates    Problem/Recommendation - 3:  Problem: Liver lesion.    -hepatology consulted, no further work up at this time   -the lesion is likely an angiomyolipoma, and not a malignant lesion.  Problem/Recommendation - 4:  ·  Problem: Hypertension  -BP stable at this time, will consider adding BB later tonight once balloon pump is removed. Continue isordil 10 tid  Problem/Recommendation - 5:  ·  Problem: Hyperlipidemia  -lipitor 40     Problem/Recommendation - 6:  Problem: Chronic CHF.   -patient is s/p revascularization, will initiate BB today, no need for diuretic at this time  Problem/Recommendation - 7:  Problem: RUTH (acute kidney injury). Recommendation: will continue to monitor BUN and creatinine  -avoid nephrotoxic agents, patient did receive contrast with cardiac cath.    Problem/Recommendation - 8:  Problem: Anemia. Recommendation: will continue to monitor CBC.    Problem/Recommendation - 9:  Problem: Diabetes mellitus type 2 in nonobese. Recommendation: will continue lantus   25 units qhs, (home dose is 35units) and fingersticks QAC and QHS, as well as ISS.    Problem/Recommendation - 10:  Problem: VTE (venous thromboembolism). Recommendation: heparin subQ 73 year old female with CKd stage 4, HTN, hyperlipidemia, DM-II, CHF (EF 20%) w/ ICD in left chest, and CAD with multiple PTCA/stents, most recent Feb 2019 who transfer from [facility] for NSTEMI s/p cath found to have 99% LM and 100% LAD received intra-aortic balloon pump and was transferred to CCU post cath for further management. also noted to have liver lesion likely need biopsy. S/p stent to L main on 6/21 and IABP removal 6/23.     ******    1.  NSTEMI (non-ST elevated myocardial infarction).   s/p stent to the left main via right radial artery  right radial artery site with hematoma, now decreased in size, nontender.  RUE doppler negative   -continue with DAPT with asa and brilinta and lipitor 40  - d/c IABP yesterday     2. CAD (coronary artery disease).    -patient % is chronic, patient will not be going for staging to the LAD, spoke with Dr. Lima  -d/c isordil, will consider starting BB later today if BP tolerates    3. Liver lesion.    -hepatology consulted, no further work up at this time   -the lesion is likely an angiomyolipoma, and not a malignant lesion.    4. Hypertension  -BP stable at this time, will consider adding BB later tonight once balloon pump is removed. Continue isordil 10 tid    5. Hyperlipidemia  -lipitor 40     6. Chronic CHF.   -patient is s/p revascularization, will initiate BB today, no need for diuretic at this time    7: RUTH (acute kidney injury). Recommendation: will continue to monitor BUN and creatinine  -avoid nephrotoxic agents, patient did receive contrast with cardiac cath.    8. Anemia. Recommendation: will continue to monitor CBC.    Problem/Recommendation - 9:  Problem: Diabetes mellitus type 2 in nonobese. Recommendation: will continue lantus   25 units qhs, (home dose is 35units) and fingersticks QAC and QHS, as well as ISS.    Problem/Recommendation - 10:  Problem: VTE (venous thromboembolism). Recommendation: heparin subQ 73 year old female with CKd stage 4, HTN, hyperlipidemia, DM-II, CHF (EF 20%) w/ ICD in left chest, and CAD with multiple PTCA/stents, most recent Feb 2019 who transfer from [facility] for NSTEMI s/p cath found to have 99% LM and 100% LAD received intra-aortic balloon pump and was transferred to CCU post cath for further management. also noted to have liver lesion likely need biopsy. S/p stent to L main on 6/21 and IABP removal 6/23.     ******    1.  NSTEMI (non-ST elevated myocardial infarction).   - s/p stent to the left main via right radial artery, s/p IABP  - right radial artery site with hematoma, now decreased in size, nontender.  RUE doppler negative   - continue with DAPT with asa and brilinta and lipitor 40    2. CAD (coronary artery disease).    -patient % is chronic, patient will not be going for staging to the LAD, spoke with Dr. Lima  - atorvastatin 40mg qHS    3. Liver lesion.    -hepatology consulted, no further work up at this time   -the lesion is likely an angiomyolipoma, and not a malignant lesion.    4. Hypertension: BP stable at this time, will proceed with HF recs to decrease afterload  - start hydralazine 5mg tid today (6/26)  - start isordil 10 tid (6/26)  - c/w metoprolol 12.5mg BID    5. Hyperlipidemia  -lipitor 40     6. Chronic CHF.   - patient is s/p revascularization  - c/w metoprolol 12.5mg BID PO  - c/w furosemide 40mg qD PO  - start 6/26 hydralazine 5mg TID to decrease afterload  - IF bp is fine, will start 6/26 PM isodil 5mg TID to decrease afterload  - Appreciate HF recs    7: RUTH (acute kidney injury). Recommendation: will continue to monitor BUN and creatinine  -avoid nephrotoxic agents, note that patient did receive contrast with cardiac cath.    8. Anemia. stable  -c/w monitoring CBC.    9: Diabetes mellitus type 2 in nonobese.   -c/w lantus   25 units qhs, (home dose is 35units) and fingersticks QAC and QHS, as well as ISS.    10: VTE (venous thromboembolism).   -heparin subQ    11. Other:  -c/w senna/colace bowel regimen 73 year old female with CKd stage 4, HTN, hyperlipidemia, DM-II, CHF (EF 20%) w/ ICD in left chest, and CAD with multiple PTCA/stents, most recent Feb 2019 who transfer from [facility] for NSTEMI s/p cath found to have 99% LM and 100% LAD received intra-aortic balloon pump and was transferred to CCU post cath for further management. also noted to have liver lesion likely need biopsy. S/p stent to L main on 6/21 and IABP removal 6/23.     ******    1.  NSTEMI (non-ST elevated myocardial infarction).   - s/p stent to the left main via right radial artery, s/p IABP  - right radial artery site with hematoma, now decreased in size, nontender.  RUE doppler negative   - continue with DAPT with asa and brilinta and lipitor 40    2. CAD (coronary artery disease).    -patient % is chronic, patient will not be going for staging to the LAD, spoke with Dr. Lima  - atorvastatin 40mg qHS    3. Liver lesion.    -hepatology consulted, no further work up at this time   -the lesion is likely an angiomyolipoma, and not a malignant lesion.    4. Hypertension: BP stable at this time, will proceed with HF recs to decrease afterload  - start hydralazine 5mg tid today (6/26)  - start isordil 10 tid (6/26)  - c/w metoprolol 12.5mg BID    5. Hyperlipidemia  -lipitor 40     6. Chronic CHF.   - patient is s/p revascularization  - c/w metoprolol 12.5mg BID PO  - c/w furosemide 40mg qD PO  - start 6/26 hydralazine 5mg TID to decrease afterload  - IF bp is fine, will start 6/26 PM isodil 5mg TID to decrease afterload  - Appreciate HF recs    7: RUTH (acute kidney injury). Recommendation: will continue to monitor BUN and creatinine  -avoid nephrotoxic agents, note that patient did receive contrast with cardiac cath.    8. Anemia. stable  -c/w monitoring CBC.    9: Diabetes mellitus type 2 in nonobese.   -c/w lantus   25 units qhs, (home dose is 35units) and fingersticks QAC and QHS, as well as ISS.    10: VTE (venous thromboembolism).   -heparin subQ    11. Other:  -c/w senna/colace bowel regimen  -start zinc oxide paste for intertrigo of bilateral groin area (6/26). 73 year old female with CKd stage 4, HTN, hyperlipidemia, DM-II, CHF (EF 20%) w/ ICD in left chest, and CAD with multiple PTCA/stents, most recent Feb 2019 who transfer from [facility] for NSTEMI s/p cath found to have 99% LM and 100% LAD received intra-aortic balloon pump and was transferred to CCU post cath for further management. also noted to have liver lesion likely need biopsy. S/p stent to L main on 6/21 and IABP removal 6/23.     ******    1.  NSTEMI (non-ST elevated myocardial infarction).   - s/p stent to the left main via right radial artery, s/p IABP  - right radial artery site with hematoma, now decreased in size, nontender.  RUE doppler negative   - continue with DAPT with asa and brilinta and lipitor 40    2. CAD (coronary artery disease).    -patient % is chronic, patient will not be going for staging to the LAD, spoke with Dr. Lima  - atorvastatin 40mg qHS    3. Liver lesion.    -hepatology consulted, no further work up at this time   -the lesion is likely an angiomyolipoma, and not a malignant lesion.    4. Hypertension: BP stable at this time, will proceed with HF recs to decrease afterload  - start hydralazine 5mg tid today (6/26)  - start isordil 10 tid (6/26)  - c/w metoprolol 12.5mg BID    5. Hyperlipidemia  -lipitor 40     6. Chronic CHF.   - patient is s/p revascularization  - c/w metoprolol 12.5mg BID PO  - c/w furosemide 40mg qD PO  - start 6/26 hydralazine 5mg TID to decrease afterload  - IF bp is fine, will start 6/26 PM isodil 5mg TID to decrease afterload  - Appreciate HF recs    7: RUTH (acute kidney injury). Recommendation: will continue to monitor BUN and creatinine  -avoid nephrotoxic agents, note that patient did receive contrast with cardiac cath.    8. Anemia. stable  -c/w monitoring CBC.    9: Diabetes mellitus type 2 in nonobese.   -c/w lantus   25 units qhs, (home dose is 35units) and fingersticks QAC and QHS, as well as ISS.    10: VTE (venous thromboembolism).   -heparin subQ    11. Other:  -c/w senna/colace bowel regimen  -use interdry cloth for intertrigo; consider zinc oxide paste for intertrigo of bilateral groin area (6/26).

## 2019-06-26 NOTE — PROGRESS NOTE ADULT - SUBJECTIVE AND OBJECTIVE BOX
Subjective/Objective:     Yesterday:  - PT eval'ed: anticipate d/c home with home PT  - HF rec continue lasix 40mg PO, Toprol 12.5qD, but hold captopril/hydral    Today:  - No acute events overnight, felt tired and SOB right after PT eval but was back to normal shortly after.  - Pt denies CP/SOB this AM.   - Reports mild abdominal pain, no nausea/vomiting. Does note occasional sharp postprandial substernal pain, only after eating. No acute reproducible pains on palpation.  - Improving R arm pain/ecchymosis.    MEDICATIONS:  MEDICATIONS  (STANDING):  aspirin enteric coated 81 milliGRAM(s) Oral daily  atorvastatin 40 milliGRAM(s) Oral at bedtime  chlorhexidine 4% Liquid 1 Application(s) Topical <User Schedule>  dextrose 5%. 1000 milliLiter(s) (50 mL/Hr) IV Continuous <Continuous>  dextrose 50% Injectable 12.5 Gram(s) IV Push once  dextrose 50% Injectable 25 Gram(s) IV Push once  docusate sodium 100 milliGRAM(s) Oral two times a day  furosemide    Tablet 40 milliGRAM(s) Oral daily  heparin  Injectable 5000 Unit(s) SubCutaneous every 8 hours  insulin glargine Injectable (LANTUS) 30 Unit(s) SubCutaneous at bedtime  insulin lispro (HumaLOG) corrective regimen sliding scale   SubCutaneous three times a day before meals  insulin lispro (HumaLOG) corrective regimen sliding scale   SubCutaneous at bedtime  insulin lispro Injectable (HumaLOG) 3 Unit(s) SubCutaneous three times a day before meals  metoprolol succinate ER 12.5 milliGRAM(s) Oral every 12 hours  pantoprazole    Tablet 40 milliGRAM(s) Oral before breakfast  senna 2 Tablet(s) Oral at bedtime  sodium chloride 0.9% lock flush 3 milliLiter(s) IV Push every 8 hours  ticagrelor 90 milliGRAM(s) Oral every 12 hours    MEDICATIONS  (PRN):  acetaminophen   Tablet .. 650 milliGRAM(s) Oral every 6 hours PRN Mild Pain (1 - 3), Moderate Pain (4 - 6), Severe Pain (7 - 10)  dextrose 40% Gel 15 Gram(s) Oral once PRN Blood Glucose LESS THAN 70 milliGRAM(s)/deciliter  glucagon  Injectable 1 milliGRAM(s) IntraMuscular once PRN Glucose LESS THAN 70 milligrams/deciliter      ALLERGIES:  Allergies    No Known Allergies    Intolerances        OBJECTIVE:  ICU Vital Signs Last 24 Hrs  T(C): 36.7 (2019 07:39), Max: 36.8 (2019 05:00)  T(F): 98 (2019 07:39), Max: 98.3 (2019 05:00)  HR: 81 (2019 08:00) (73 - 98)  BP: 106/37 (2019 08:00) (89/40 - 163/108)  BP(mean): 54 (2019 08:00) (52 - 95)  ABP: --  ABP(mean): --  RR: 21 (2019 08:00) (11 - 24)  SpO2: --        POCT Blood Glucose.: 95 mg/dL (2019 07:40)  POCT Blood Glucose.: 311 mg/dL (2019 21:48)  POCT Blood Glucose.: 284 mg/dL (2019 16:51)  POCT Blood Glucose.: 125 mg/dL (2019 11:48)    CAPILLARY BLOOD GLUCOSE      POCT Blood Glucose.: 95 mg/dL (2019 07:40)    I&O's Summary    2019 07:01  -  2019 07:00  --------------------------------------------------------  IN: 360 mL / OUT: 1380 mL / NET: -1020 mL      Daily     Daily Weight in k.8 (2019 05:00)    PHYSICAL EXAM  General: Lying in bed, in NAD  Neurology: A&Ox3  Eyes: PERRLA/ EOMI, Gross vision intact  Respiratory: B/L crackles LL  CV: RRR, S1S2, no murmurs, rubs or gallops, no JVD  Abdominal: Mild ttp over substernal epigastrium, soft, nondistended  Extremities: No edema, + peripheral pulses. R arm hematoma decreased in size, no warmth/erythema   Skin: intertrigo    LABS:                          8.5    10.67 )-----------( 227      ( 2019 06:00 )             26.5     06-26    139  |  102  |  51<H>  ----------------------------<  117<H>  4.5   |  23  |  1.86<H>    Ca    9.3      2019 06:00  Phos  4.2       Mg     2.1         TPro  7.5  /  Alb  3.5  /  TBili  0.4  /  DBili  x   /  AST  54<H>  /  ALT  40<H>  /  AlkPhos  121<H>      LIVER FUNCTIONS - ( 2019 15:30 )  Alb: 3.5 g/dL / Pro: 7.5 g/dL / ALK PHOS: 121 u/L / ALT: 40 u/L / AST: 54 u/L / GGT: x           PT/INR - ( 2019 06:00 )   PT: 12.0 SEC;   INR: 1.08          PTT - ( 2019 06:00 )  PTT:91.2 SEC          EKG/ TELEM: normal sinus (), no acute tele events o/n

## 2019-06-27 LAB
ALBUMIN SERPL ELPH-MCNC: 3.1 G/DL — LOW (ref 3.3–5)
ALP SERPL-CCNC: 107 U/L — SIGNIFICANT CHANGE UP (ref 40–120)
ALT FLD-CCNC: 24 U/L — SIGNIFICANT CHANGE UP (ref 4–33)
ANION GAP SERPL CALC-SCNC: 16 MMO/L — HIGH (ref 7–14)
AST SERPL-CCNC: 19 U/L — SIGNIFICANT CHANGE UP (ref 4–32)
BILIRUB SERPL-MCNC: 0.3 MG/DL — SIGNIFICANT CHANGE UP (ref 0.2–1.2)
BUN SERPL-MCNC: 56 MG/DL — HIGH (ref 7–23)
CALCIUM SERPL-MCNC: 9.8 MG/DL — SIGNIFICANT CHANGE UP (ref 8.4–10.5)
CHLORIDE SERPL-SCNC: 102 MMOL/L — SIGNIFICANT CHANGE UP (ref 98–107)
CO2 SERPL-SCNC: 23 MMOL/L — SIGNIFICANT CHANGE UP (ref 22–31)
CREAT ?TM UR-MCNC: 54 MG/DL — SIGNIFICANT CHANGE UP
CREAT SERPL-MCNC: 1.86 MG/DL — HIGH (ref 0.5–1.3)
GLUCOSE BLDC GLUCOMTR-MCNC: 141 MG/DL — HIGH (ref 70–99)
GLUCOSE BLDC GLUCOMTR-MCNC: 173 MG/DL — HIGH (ref 70–99)
GLUCOSE BLDC GLUCOMTR-MCNC: 187 MG/DL — HIGH (ref 70–99)
GLUCOSE BLDC GLUCOMTR-MCNC: 275 MG/DL — HIGH (ref 70–99)
GLUCOSE SERPL-MCNC: 135 MG/DL — HIGH (ref 70–99)
HCT VFR BLD CALC: 27.4 % — LOW (ref 34.5–45)
HGB BLD-MCNC: 8.7 G/DL — LOW (ref 11.5–15.5)
MAGNESIUM SERPL-MCNC: 2.2 MG/DL — SIGNIFICANT CHANGE UP (ref 1.6–2.6)
MCHC RBC-ENTMCNC: 26.1 PG — LOW (ref 27–34)
MCHC RBC-ENTMCNC: 31.8 % — LOW (ref 32–36)
MCV RBC AUTO: 82.3 FL — SIGNIFICANT CHANGE UP (ref 80–100)
NRBC # FLD: 0.02 K/UL — SIGNIFICANT CHANGE UP (ref 0–0)
PHOSPHATE SERPL-MCNC: 5.9 MG/DL — HIGH (ref 2.5–4.5)
PLATELET # BLD AUTO: 273 K/UL — SIGNIFICANT CHANGE UP (ref 150–400)
PMV BLD: 11.8 FL — SIGNIFICANT CHANGE UP (ref 7–13)
POTASSIUM SERPL-MCNC: 4.4 MMOL/L — SIGNIFICANT CHANGE UP (ref 3.5–5.3)
POTASSIUM SERPL-SCNC: 4.4 MMOL/L — SIGNIFICANT CHANGE UP (ref 3.5–5.3)
PROT SERPL-MCNC: 7.2 G/DL — SIGNIFICANT CHANGE UP (ref 6–8.3)
PROT UR-MCNC: 19.2 MG/DL — SIGNIFICANT CHANGE UP
RBC # BLD: 3.33 M/UL — LOW (ref 3.8–5.2)
RBC # FLD: 14.8 % — HIGH (ref 10.3–14.5)
SODIUM SERPL-SCNC: 141 MMOL/L — SIGNIFICANT CHANGE UP (ref 135–145)
WBC # BLD: 9.28 K/UL — SIGNIFICANT CHANGE UP (ref 3.8–10.5)
WBC # FLD AUTO: 9.28 K/UL — SIGNIFICANT CHANGE UP (ref 3.8–10.5)

## 2019-06-27 RX ADMIN — CHLORHEXIDINE GLUCONATE 1 APPLICATION(S): 213 SOLUTION TOPICAL at 06:17

## 2019-06-27 RX ADMIN — ISOSORBIDE DINITRATE 5 MILLIGRAM(S): 5 TABLET ORAL at 06:16

## 2019-06-27 RX ADMIN — Medication 12.5 MILLIGRAM(S): at 06:14

## 2019-06-27 RX ADMIN — Medication 650 MILLIGRAM(S): at 13:45

## 2019-06-27 RX ADMIN — HEPARIN SODIUM 5000 UNIT(S): 5000 INJECTION INTRAVENOUS; SUBCUTANEOUS at 06:17

## 2019-06-27 RX ADMIN — Medication 5 MILLIGRAM(S): at 13:40

## 2019-06-27 RX ADMIN — Medication 5 MILLIGRAM(S): at 22:26

## 2019-06-27 RX ADMIN — Medication 3 UNIT(S): at 13:41

## 2019-06-27 RX ADMIN — HEPARIN SODIUM 5000 UNIT(S): 5000 INJECTION INTRAVENOUS; SUBCUTANEOUS at 22:25

## 2019-06-27 RX ADMIN — Medication 2: at 13:41

## 2019-06-27 RX ADMIN — Medication 40 MILLIGRAM(S): at 06:16

## 2019-06-27 RX ADMIN — TICAGRELOR 90 MILLIGRAM(S): 90 TABLET ORAL at 22:26

## 2019-06-27 RX ADMIN — PANTOPRAZOLE SODIUM 40 MILLIGRAM(S): 20 TABLET, DELAYED RELEASE ORAL at 06:18

## 2019-06-27 RX ADMIN — Medication 6: at 18:04

## 2019-06-27 RX ADMIN — Medication 81 MILLIGRAM(S): at 09:23

## 2019-06-27 RX ADMIN — Medication 100 MILLIGRAM(S): at 18:04

## 2019-06-27 RX ADMIN — Medication 650 MILLIGRAM(S): at 14:45

## 2019-06-27 RX ADMIN — ATORVASTATIN CALCIUM 40 MILLIGRAM(S): 80 TABLET, FILM COATED ORAL at 22:26

## 2019-06-27 RX ADMIN — SODIUM CHLORIDE 3 MILLILITER(S): 9 INJECTION INTRAMUSCULAR; INTRAVENOUS; SUBCUTANEOUS at 22:33

## 2019-06-27 RX ADMIN — INSULIN GLARGINE 30 UNIT(S): 100 INJECTION, SOLUTION SUBCUTANEOUS at 22:26

## 2019-06-27 RX ADMIN — SODIUM CHLORIDE 3 MILLILITER(S): 9 INJECTION INTRAMUSCULAR; INTRAVENOUS; SUBCUTANEOUS at 06:19

## 2019-06-27 RX ADMIN — HEPARIN SODIUM 5000 UNIT(S): 5000 INJECTION INTRAVENOUS; SUBCUTANEOUS at 13:40

## 2019-06-27 RX ADMIN — TICAGRELOR 90 MILLIGRAM(S): 90 TABLET ORAL at 09:24

## 2019-06-27 RX ADMIN — Medication 5 MILLIGRAM(S): at 06:15

## 2019-06-27 RX ADMIN — Medication 3 UNIT(S): at 18:03

## 2019-06-27 RX ADMIN — SODIUM CHLORIDE 3 MILLILITER(S): 9 INJECTION INTRAMUSCULAR; INTRAVENOUS; SUBCUTANEOUS at 13:43

## 2019-06-27 RX ADMIN — ISOSORBIDE DINITRATE 5 MILLIGRAM(S): 5 TABLET ORAL at 13:41

## 2019-06-27 RX ADMIN — Medication 100 MILLIGRAM(S): at 06:19

## 2019-06-27 RX ADMIN — ISOSORBIDE DINITRATE 5 MILLIGRAM(S): 5 TABLET ORAL at 22:38

## 2019-06-27 RX ADMIN — Medication 3 UNIT(S): at 09:23

## 2019-06-27 RX ADMIN — Medication 12.5 MILLIGRAM(S): at 18:04

## 2019-06-27 NOTE — PROGRESS NOTE ADULT - SUBJECTIVE AND OBJECTIVE BOX
[facility]   MD YENI MARTÍNEZ MD ANGELA WONG, PA    TEL:  OFFICE: [phone]  DR LEUNG CELL: [phone]  DR. LUNA CELL: [phone]  JAJA VELASQUEZ CELL: [phone]        Patient is a 73y old  Female who presents with a chief complaint of Chest pains (23 Jun 2019 15:18)      Patient seen and examined at bedside. No chest pain/sob    VITALS:  T(F): 97 (06-27-19 @ 14:31), Max: 98.2 (06-26-19 @ 20:00)  HR: 78 (06-27-19 @ 14:31)  BP: 107/66 (06-27-19 @ 14:31)  RR: 16 (06-27-19 @ 14:31)  SpO2: 100% (06-27-19 @ 14:31)  Wt(kg): --    06-26 @ 07:01  -  06-27 @ 07:00  --------------------------------------------------------  IN: 610 mL / OUT: 1430 mL / NET: -820 mL          PHYSICAL EXAM:  Constitutional: NAD  Neck: No JVD  Respiratory: CTAB, no wheezes, rales or rhonchi  Cardiovascular: S1, S2, RRR  Gastrointestinal: BS+, soft, NT/ND  Extremities: No peripheral edema    Hospital Medications:   MEDICATIONS  (STANDING):  aspirin enteric coated 81 milliGRAM(s) Oral daily  atorvastatin 40 milliGRAM(s) Oral at bedtime  chlorhexidine 4% Liquid 1 Application(s) Topical <User Schedule>  dextrose 5%. 1000 milliLiter(s) (50 mL/Hr) IV Continuous <Continuous>  dextrose 50% Injectable 12.5 Gram(s) IV Push once  dextrose 50% Injectable 25 Gram(s) IV Push once  docusate sodium 100 milliGRAM(s) Oral two times a day  furosemide    Tablet 40 milliGRAM(s) Oral daily  heparin  Injectable 5000 Unit(s) SubCutaneous every 8 hours  hydrALAZINE 5 milliGRAM(s) Oral every 8 hours  insulin glargine Injectable (LANTUS) 30 Unit(s) SubCutaneous at bedtime  insulin lispro (HumaLOG) corrective regimen sliding scale   SubCutaneous three times a day before meals  insulin lispro (HumaLOG) corrective regimen sliding scale   SubCutaneous at bedtime  insulin lispro Injectable (HumaLOG) 3 Unit(s) SubCutaneous three times a day before meals  isosorbide   dinitrate Tablet (ISORDIL) 5 milliGRAM(s) Oral three times a day  metoprolol succinate ER 12.5 milliGRAM(s) Oral every 12 hours  pantoprazole    Tablet 40 milliGRAM(s) Oral before breakfast  senna 2 Tablet(s) Oral at bedtime  sodium chloride 0.9% lock flush 3 milliLiter(s) IV Push every 8 hours  ticagrelor 90 milliGRAM(s) Oral every 12 hours      LABS:  06-27    141  |  102  |  56<H>  ----------------------------<  135<H>  4.4   |  23  |  1.86<H>    Ca    9.8      27 Jun 2019 06:20  Phos  5.9     06-27  Mg     2.2     06-27    TPro  7.2  /  Alb  3.1<L>  /  TBili  0.3  /  DBili      /  AST  19  /  ALT  24  /  AlkPhos  107  06-27    Creatinine Trend: 1.86 <--, 1.86 <--, 1.93 <--, 2.10 <--, 1.88 <--, 1.75 <--, 1.79 <--, 1.99 <--    Phosphorus Level, Serum: 5.9 mg/dL (06-27 @ 06:20)  Albumin, Serum: 3.1 g/dL (06-27 @ 06:20)                              8.7    9.28  )-----------( 273      ( 27 Jun 2019 06:20 )             27.4     Urine Studies:    Urine Creatinine 54.00      [06-27-19 @ 06:30]  Urine Protein 19.2      [06-27-19 @ 06:30]    HbA1c 15.1      [06-15-19 @ 12:17]  TSH 1.66      [06-15-19 @ 07:30]  Lipid: chol 118, , HDL 29, LDL 56      [06-15-19 @ 07:30]    HBsAb Reactive      [06-19-19 @ 19:32]  HBsAg NEGATIVE      [06-20-19 @ 12:00]  HBcAb Reactive      [06-19-19 @ 19:32]  HCV 0.09, Nonreact      [06-15-19 @ 12:23]      RADIOLOGY & ADDITIONAL STUDIES:

## 2019-06-27 NOTE — PROGRESS NOTE ADULT - ASSESSMENT
73 year old female with CKd stage 4, HTN, hyperlipidemia, DM-II, CHF (EF 20%) w/ ICD in left chest, and CAD with multiple PTCA/stents, most recent Feb 2019 who transfer from [facility] for NSTEMI s/p cath found to have 99% LM and 100% LAD received intra-aortic balloon pump and was transferred to CCU post cath for further management. also noted to have liver lesion likely need biopsy     CKD stage 4  baseline ~ 1.7-1.9 She remains non-Oliguric.  CKD likely sec to DM/HTN/CHF  renal function relatively stable today  Continue to monitor bmp, monitor I/O  avoid nephrotoxic agents  monitor bmp      HTN  controlled  monitor BP    proteinuria  urine p/c 355mg/day  mild  likely sec to DM  vasculitis work up can be done outpatient    NSTEMI  f/u cardio    CKD-MBD  check PTH, phos level  monitor phos and calcium daily

## 2019-06-28 LAB
24R-OH-CALCIDIOL SERPL-MCNC: 25.7 NG/ML — LOW (ref 30–80)
ALBUMIN SERPL ELPH-MCNC: 3.2 G/DL — LOW (ref 3.3–5)
ALP SERPL-CCNC: 105 U/L — SIGNIFICANT CHANGE UP (ref 40–120)
ALT FLD-CCNC: 22 U/L — SIGNIFICANT CHANGE UP (ref 4–33)
ANION GAP SERPL CALC-SCNC: 15 MMO/L — HIGH (ref 7–14)
APPEARANCE UR: CLEAR — SIGNIFICANT CHANGE UP
AST SERPL-CCNC: 17 U/L — SIGNIFICANT CHANGE UP (ref 4–32)
BILIRUB SERPL-MCNC: 0.4 MG/DL — SIGNIFICANT CHANGE UP (ref 0.2–1.2)
BILIRUB UR-MCNC: NEGATIVE — SIGNIFICANT CHANGE UP
BLOOD UR QL VISUAL: SIGNIFICANT CHANGE UP
BUN SERPL-MCNC: 63 MG/DL — HIGH (ref 7–23)
CALCIUM SERPL-MCNC: 9.3 MG/DL — SIGNIFICANT CHANGE UP (ref 8.4–10.5)
CHLORIDE SERPL-SCNC: 101 MMOL/L — SIGNIFICANT CHANGE UP (ref 98–107)
CO2 SERPL-SCNC: 21 MMOL/L — LOW (ref 22–31)
COLOR SPEC: COLORLESS — SIGNIFICANT CHANGE UP
CREAT ?TM UR-MCNC: 21.2 MG/DL — SIGNIFICANT CHANGE UP
CREAT SERPL-MCNC: 2.22 MG/DL — HIGH (ref 0.5–1.3)
GLUCOSE BLDC GLUCOMTR-MCNC: 132 MG/DL — HIGH (ref 70–99)
GLUCOSE BLDC GLUCOMTR-MCNC: 202 MG/DL — HIGH (ref 70–99)
GLUCOSE BLDC GLUCOMTR-MCNC: 244 MG/DL — HIGH (ref 70–99)
GLUCOSE BLDC GLUCOMTR-MCNC: 246 MG/DL — HIGH (ref 70–99)
GLUCOSE SERPL-MCNC: 194 MG/DL — HIGH (ref 70–99)
GLUCOSE UR-MCNC: NEGATIVE — SIGNIFICANT CHANGE UP
HCT VFR BLD CALC: 25.7 % — LOW (ref 34.5–45)
HGB BLD-MCNC: 8.1 G/DL — LOW (ref 11.5–15.5)
KETONES UR-MCNC: NEGATIVE — SIGNIFICANT CHANGE UP
LEUKOCYTE ESTERASE UR-ACNC: NEGATIVE — SIGNIFICANT CHANGE UP
MAGNESIUM SERPL-MCNC: 2.1 MG/DL — SIGNIFICANT CHANGE UP (ref 1.6–2.6)
MCHC RBC-ENTMCNC: 25.8 PG — LOW (ref 27–34)
MCHC RBC-ENTMCNC: 31.5 % — LOW (ref 32–36)
MCV RBC AUTO: 81.8 FL — SIGNIFICANT CHANGE UP (ref 80–100)
NITRITE UR-MCNC: NEGATIVE — SIGNIFICANT CHANGE UP
NRBC # FLD: 0 K/UL — SIGNIFICANT CHANGE UP (ref 0–0)
PH UR: 6 — SIGNIFICANT CHANGE UP (ref 5–8)
PLATELET # BLD AUTO: 309 K/UL — SIGNIFICANT CHANGE UP (ref 150–400)
PMV BLD: 11.6 FL — SIGNIFICANT CHANGE UP (ref 7–13)
POTASSIUM SERPL-MCNC: 4.2 MMOL/L — SIGNIFICANT CHANGE UP (ref 3.5–5.3)
POTASSIUM SERPL-SCNC: 4.2 MMOL/L — SIGNIFICANT CHANGE UP (ref 3.5–5.3)
PROT SERPL-MCNC: 7.1 G/DL — SIGNIFICANT CHANGE UP (ref 6–8.3)
PROT UR-MCNC: NEGATIVE — SIGNIFICANT CHANGE UP
PTH-INTACT SERPL-MCNC: 51.45 PG/ML — SIGNIFICANT CHANGE UP (ref 15–65)
RBC # BLD: 3.14 M/UL — LOW (ref 3.8–5.2)
RBC # FLD: 14.7 % — HIGH (ref 10.3–14.5)
SODIUM SERPL-SCNC: 137 MMOL/L — SIGNIFICANT CHANGE UP (ref 135–145)
SP GR SPEC: 1.01 — SIGNIFICANT CHANGE UP (ref 1–1.04)
UROBILINOGEN FLD QL: NORMAL — SIGNIFICANT CHANGE UP
UUN UR-MCNC: 199.2 MG/DL — SIGNIFICANT CHANGE UP
WBC # BLD: 9.62 K/UL — SIGNIFICANT CHANGE UP (ref 3.8–10.5)
WBC # FLD AUTO: 9.62 K/UL — SIGNIFICANT CHANGE UP (ref 3.8–10.5)

## 2019-06-28 PROCEDURE — 99233 SBSQ HOSP IP/OBS HIGH 50: CPT

## 2019-06-28 RX ORDER — ATORVASTATIN CALCIUM 80 MG/1
80 TABLET, FILM COATED ORAL AT BEDTIME
Refills: 0 | Status: DISCONTINUED | OUTPATIENT
Start: 2019-06-28 | End: 2019-06-30

## 2019-06-28 RX ADMIN — Medication 3 UNIT(S): at 09:02

## 2019-06-28 RX ADMIN — Medication 3 UNIT(S): at 18:09

## 2019-06-28 RX ADMIN — CHLORHEXIDINE GLUCONATE 1 APPLICATION(S): 213 SOLUTION TOPICAL at 07:10

## 2019-06-28 RX ADMIN — SODIUM CHLORIDE 3 MILLILITER(S): 9 INJECTION INTRAMUSCULAR; INTRAVENOUS; SUBCUTANEOUS at 14:31

## 2019-06-28 RX ADMIN — TICAGRELOR 90 MILLIGRAM(S): 90 TABLET ORAL at 09:03

## 2019-06-28 RX ADMIN — Medication 100 MILLIGRAM(S): at 18:08

## 2019-06-28 RX ADMIN — HEPARIN SODIUM 5000 UNIT(S): 5000 INJECTION INTRAVENOUS; SUBCUTANEOUS at 07:10

## 2019-06-28 RX ADMIN — ISOSORBIDE DINITRATE 5 MILLIGRAM(S): 5 TABLET ORAL at 07:10

## 2019-06-28 RX ADMIN — Medication 12.5 MILLIGRAM(S): at 07:10

## 2019-06-28 RX ADMIN — SODIUM CHLORIDE 3 MILLILITER(S): 9 INJECTION INTRAMUSCULAR; INTRAVENOUS; SUBCUTANEOUS at 21:06

## 2019-06-28 RX ADMIN — ATORVASTATIN CALCIUM 80 MILLIGRAM(S): 80 TABLET, FILM COATED ORAL at 21:28

## 2019-06-28 RX ADMIN — Medication 650 MILLIGRAM(S): at 14:45

## 2019-06-28 RX ADMIN — Medication 12.5 MILLIGRAM(S): at 18:09

## 2019-06-28 RX ADMIN — Medication 4: at 09:02

## 2019-06-28 RX ADMIN — INSULIN GLARGINE 30 UNIT(S): 100 INJECTION, SOLUTION SUBCUTANEOUS at 22:11

## 2019-06-28 RX ADMIN — TICAGRELOR 90 MILLIGRAM(S): 90 TABLET ORAL at 21:28

## 2019-06-28 RX ADMIN — HEPARIN SODIUM 5000 UNIT(S): 5000 INJECTION INTRAVENOUS; SUBCUTANEOUS at 21:28

## 2019-06-28 RX ADMIN — Medication 4: at 18:09

## 2019-06-28 RX ADMIN — SODIUM CHLORIDE 3 MILLILITER(S): 9 INJECTION INTRAMUSCULAR; INTRAVENOUS; SUBCUTANEOUS at 07:12

## 2019-06-28 RX ADMIN — Medication 5 MILLIGRAM(S): at 07:09

## 2019-06-28 RX ADMIN — Medication 4: at 13:30

## 2019-06-28 RX ADMIN — HEPARIN SODIUM 5000 UNIT(S): 5000 INJECTION INTRAVENOUS; SUBCUTANEOUS at 14:33

## 2019-06-28 RX ADMIN — ISOSORBIDE DINITRATE 5 MILLIGRAM(S): 5 TABLET ORAL at 14:33

## 2019-06-28 RX ADMIN — PANTOPRAZOLE SODIUM 40 MILLIGRAM(S): 20 TABLET, DELAYED RELEASE ORAL at 07:10

## 2019-06-28 RX ADMIN — Medication 40 MILLIGRAM(S): at 07:10

## 2019-06-28 RX ADMIN — Medication 81 MILLIGRAM(S): at 09:03

## 2019-06-28 RX ADMIN — Medication 3 UNIT(S): at 13:30

## 2019-06-28 RX ADMIN — Medication 650 MILLIGRAM(S): at 15:45

## 2019-06-28 NOTE — PROGRESS NOTE ADULT - ASSESSMENT
73 year old female with CKd stage 4, HTN, hyperlipidemia, DM-II, CHF (EF 20%) w/ ICD in left chest, and CAD with multiple PTCA/stents, most recent Feb 2019 who transfer from [facility] for NSTEMI s/p cath found to have 99% LM and 100% LAD received intra-aortic balloon pump and was transferred to CCU post cath for further management. also noted to have liver lesion likely need biopsy. S/p stent to L main on 6/21 and IABP removal 6/23.         NSTEMI (non-ST elevated myocardial infarction).   - s/p stent to the left main via right radial artery, s/p IABP  - right radial artery site with hematoma, now decreased in size, nontender.  RUE doppler negative   - continue with DAPT with asa and brilinta and lipitor 40    2. CAD (coronary artery disease).    -patient % is chronic, patient will not be going for staging to the LAD, spoke with Dr. Lima  - atorvastatin 40mg qHS    3. Liver lesion.    -hepatology consulted, no further work up at this time   -the lesion is likely an angiomyolipoma, and not a malignant lesion.    4. Hypertension: BP stable at this time, will proceed with HF recs to decrease afterload  - start hydralazine 5mg tid today (6/26)  - start isordil 10 tid (6/26)  - c/w metoprolol 12.5mg BID    5. Hyperlipidemia  -lipitor 40     6. Chronic CHF.   - patient is s/p revascularization  - c/w metoprolol 12.5mg BID PO  - c/w furosemide 40mg qD PO  - start 6/26 hydralazine 5mg TID to decrease afterload  - IF bp is fine, will start 6/26 PM isodil 5mg TID to decrease afterload  - Appreciate HF recs    7: RUTH (acute kidney injury). Recommendation: will continue to monitor BUN and creatinine  -avoid nephrotoxic agents, note that patient did receive contrast with cardiac cath.    8. Anemia. stable  -c/w monitoring CBC.    9: Diabetes mellitus type 2 in nonobese.   -c/w lantus   25 units qhs, (home dose is 35units) and fingersticks QAC and QHS, as well as ISS.    10: VTE (venous thromboembolism).   -heparin subQ    11. Other:  -c/w senna/colace bowel regimen  -use interdry cloth for intertrigo; consider zinc oxide paste for intertrigo of bilateral groin area (6/26). 73 year old female with CKd stage 4, HTN, hyperlipidemia, DM-II, CHF (EF 20%) w/ ICD in left chest, and CAD with multiple PTCA/stents, most recent Feb 2019 who transfer from [facility] for NSTEMI s/p cath found to have 99% LM and 100% LAD received intra-aortic balloon pump and was transferred to CCU post cath for further management. also noted to have liver lesion likely need biopsy. S/p stent to L main on 6/21 and IABP removal 6/23.         NSTEMI (non-ST elevated myocardial infarction).   - s/p stent to the left main via right radial artery, s/p IABP  - right radial artery site with hematoma, now decreased in size, nontender.  RUE doppler negative   - continue with DAPT with asa and brilinta and increase lipitor 80  -patient % is chronic, patient will not be going for staging to the LAD, spoke with Dr. Lima      Liver lesion.    -hepatology consulted, no further work up at this time   -the lesion is likely an angiomyolipoma, and not a malignant lesion.    Hyperlipidemia  -lipitor 80     Acute on chronic systolic HF.   - patient is s/p revascularization  - Increase metoprolol 25mg PO BID  - D/C furosemide, now with RUTH  - C/W hydralazine 5mg, isordil 5mg   - Appreciate HF recs    RUTH (acute kidney injury)  - will continue to monitor BUN and creatinine  - avoid nephrotoxic agents, note that patient did receive contrast with cardiac cath.    Dispo   - D/C home when Scr is trending down

## 2019-06-28 NOTE — PROGRESS NOTE ADULT - SUBJECTIVE AND OBJECTIVE BOX
[facility]   MD YENI MARTÍNEZ MD ANGELA WONG, PA    TEL:  OFFICE: [phone]  DR LEUNG CELL: [phone]  DR. LUNA CELL: [phone]  JAJA VELASQUEZ CELL: [phone]        Patient is a 73y old  Female who presents with a chief complaint of Chest pains (23 Jun 2019 15:18)      Patient seen and examined at bedside. No chest pain/sob. + dizziness on ambulation    VITALS:  T(F): 97.3 (06-28-19 @ 14:21), Max: 97.4 (06-28-19 @ 07:05)  HR: 80 (06-28-19 @ 14:21)  BP: 100/45 (06-28-19 @ 14:21)  RR: 18 (06-28-19 @ 14:21)  SpO2: 100% (06-28-19 @ 14:21)  Wt(kg): --        PHYSICAL EXAM:  Constitutional: NAD  Neck: No JVD  Respiratory: CTAB, no wheezes, rales or rhonchi  Cardiovascular: S1, S2, RRR  Gastrointestinal: BS+, soft, NT/ND  Extremities: No peripheral edema    Hospital Medications:   MEDICATIONS  (STANDING):  aspirin enteric coated 81 milliGRAM(s) Oral daily  atorvastatin 80 milliGRAM(s) Oral at bedtime  chlorhexidine 4% Liquid 1 Application(s) Topical <User Schedule>  dextrose 5%. 1000 milliLiter(s) (50 mL/Hr) IV Continuous <Continuous>  dextrose 50% Injectable 12.5 Gram(s) IV Push once  dextrose 50% Injectable 25 Gram(s) IV Push once  docusate sodium 100 milliGRAM(s) Oral two times a day  heparin  Injectable 5000 Unit(s) SubCutaneous every 8 hours  hydrALAZINE 5 milliGRAM(s) Oral every 8 hours  insulin glargine Injectable (LANTUS) 30 Unit(s) SubCutaneous at bedtime  insulin lispro (HumaLOG) corrective regimen sliding scale   SubCutaneous three times a day before meals  insulin lispro (HumaLOG) corrective regimen sliding scale   SubCutaneous at bedtime  insulin lispro Injectable (HumaLOG) 3 Unit(s) SubCutaneous three times a day before meals  isosorbide   dinitrate Tablet (ISORDIL) 5 milliGRAM(s) Oral three times a day  metoprolol succinate ER 12.5 milliGRAM(s) Oral every 12 hours  pantoprazole    Tablet 40 milliGRAM(s) Oral before breakfast  senna 2 Tablet(s) Oral at bedtime  sodium chloride 0.9% lock flush 3 milliLiter(s) IV Push every 8 hours  ticagrelor 90 milliGRAM(s) Oral every 12 hours      LABS:  06-28    137  |  101  |  63<H>  ----------------------------<  194<H>  4.2   |  21<L>  |  2.22<H>    Ca    9.3      28 Jun 2019 06:30  Phos  5.9     06-27  Mg     2.1     06-28    TPro  7.1  /  Alb  3.2<L>  /  TBili  0.4  /  DBili      /  AST  17  /  ALT  22  /  AlkPhos  105  06-28    Creatinine Trend: 2.22 <--, 1.86 <--, 1.86 <--, 1.93 <--, 2.10 <--, 1.88 <--, 1.75 <--, 1.79 <--    Albumin, Serum: 3.2 g/dL (06-28 @ 06:30)    calcium--  intact pth--  parathyroid hormone intact, serum51.45                        8.1    9.62  )-----------( 309      ( 28 Jun 2019 06:30 )             25.7     Urine Studies:  Urinalysis - [06-28-19 @ 11:55]      Color COLORLESS / Appearance CLEAR / SG 1.008 / pH 6.0      Gluc NEGATIVE / Ketone NEGATIVE  / Bili NEGATIVE / Urobili NORMAL       Blood TRACE / Protein NEGATIVE / Leuk Est NEGATIVE / Nitrite NEGATIVE      RBC  / WBC  / Hyaline  / Gran  / Sq Epi  / Non Sq Epi  / Bacteria     Urine Creatinine 21.20      [06-28-19 @ 11:55]  Urine Protein 19.2      [06-27-19 @ 06:30]  Urine Urea Nitrogen 199.2      [06-28-19 @ 11:55]    PTH 51.45 (Ca --)      [06-28-19 @ 06:30]   --  HbA1c 15.1      [06-15-19 @ 12:17]  TSH 1.66      [06-15-19 @ 07:30]  Lipid: chol 118, , HDL 29, LDL 56      [06-15-19 @ 07:30]    HBsAb Reactive      [06-19-19 @ 19:32]  HBsAg NEGATIVE      [06-20-19 @ 12:00]  HBcAb Reactive      [06-19-19 @ 19:32]  HCV 0.09, Nonreact      [06-15-19 @ 12:23]      RADIOLOGY & ADDITIONAL STUDIES:

## 2019-06-28 NOTE — PROGRESS NOTE ADULT - ATTENDING COMMENTS
Patient seen and examined  Agree with above assessment and plan  Patient doing well  On Dapt and statin  Cont current meds  DC planning

## 2019-06-28 NOTE — PROGRESS NOTE ADULT - ASSESSMENT
73 year old female with CKd stage 4, HTN, hyperlipidemia, DM-II, CHF (EF 20%) w/ ICD in left chest, and CAD with multiple PTCA/stents, most recent Feb 2019 who transfer from [facility] for NSTEMI s/p cath found to have 99% LM and 100% LAD received intra-aortic balloon pump and was transferred to CCU post cath for further management. also noted to have liver lesion likely need biopsy     RUTH  RUTH  scr elevated 2.22 today  ? etiology  FEurea<35% suggested prerenal however urine specific gravity is low not consistent with dehydration  on lasix 40mg po daily stopped by cardio ( did receive today's dose)  possible ATN sec to hypotension, bp low 90s sometimes  + dizziness with ambulation  check orthostatic vitals  continue to monitor bmp    CKD stage 4  baseline ~ 1.7-1.9 She remains non-Oliguric.  CKD likely sec to DM/HTN/CHF  renal function relatively stable today  Continue to monitor bmp, monitor I/O  avoid nephrotoxic agents  monitor bmp      HTN  controlled  monitor BP    proteinuria  urine p/c 355mg/day  mild  likely sec to DM  vasculitis work up can be done outpatient    NSTEMI  f/u cardio    CKD-MBD  optimal PTH  elevated phos level, low phos diet. if worsen will start phoslo  monitor phos and calcium daily 73 year old female with CKd stage 4, HTN, hyperlipidemia, DM-II, CHF (EF 20%) w/ ICD in left chest, and CAD with multiple PTCA/stents, most recent Feb 2019 who transfer from [facility] for NSTEMI s/p cath found to have 99% LM and 100% LAD received intra-aortic balloon pump and was transferred to CCU post cath for further management. also noted to have liver lesion likely need biopsy     RUTH  RUHT  scr elevated 2.22 today  ? etiology  FEurea<35% suggested prerenal however urine specific gravity is low not consistent with dehydration  on lasix 40mg po daily stopped by cardio (did receive today's dose)  possible ATN sec to hypotension, bp low 90s sometimes  + dizziness with ambulation  check orthostatic vitals  continue to monitor bmp at present    CKD stage 4  baseline ~ 1.7-1.9 She remains non-Oliguric.  CKD likely sec to DM/HTN/CHF  renal function relatively stable today  Continue to monitor bmp, monitor I/O  avoid nephrotoxic agents  monitor bmp      HTN  controlled  monitor BP    proteinuria  urine p/c 355mg/day  mild  likely sec to DM  vasculitis work up can be done outpatient    NSTEMI  f/u cardio    CKD-MBD  optimal PTH  elevated phos level, low phos diet. if worsen will start phoslo  monitor phos and calcium daily

## 2019-06-28 NOTE — PROGRESS NOTE ADULT - SUBJECTIVE AND OBJECTIVE BOX
Tele: NSR w/ PVCs     Overnight: No complaints     MEDICATIONS  (STANDING):  aspirin enteric coated 81 milliGRAM(s) Oral daily  atorvastatin 40 milliGRAM(s) Oral at bedtime  docusate sodium 100 milliGRAM(s) Oral two times a day  heparin  Injectable 5000 Unit(s) SubCutaneous every 8 hours  hydrALAZINE 5 milliGRAM(s) Oral every 8 hours  insulin glargine Injectable (LANTUS) 30 Unit(s) SubCutaneous at bedtime  insulin lispro (HumaLOG) corrective regimen sliding scale   SubCutaneous three times a day before meals  insulin lispro (HumaLOG) corrective regimen sliding scale   SubCutaneous at bedtime  insulin lispro Injectable (HumaLOG) 3 Unit(s) SubCutaneous three times a day before meals  isosorbide   dinitrate Tablet (ISORDIL) 5 milliGRAM(s) Oral three times a day  metoprolol succinate ER 12.5 milliGRAM(s) Oral every 12 hours  pantoprazole    Tablet 40 milliGRAM(s) Oral before breakfast  senna 2 Tablet(s) Oral at bedtime  ticagrelor 90 milliGRAM(s) Oral every 12 hours    MEDICATIONS  (PRN):  acetaminophen   Tablet .. 650 milliGRAM(s) Oral every 6 hours PRN Mild Pain (1 - 3), Moderate Pain (4 - 6), Severe Pain (7 - 10)  dextrose 40% Gel 15 Gram(s) Oral once PRN Blood Glucose LESS THAN 70 milliGRAM(s)/deciliter  glucagon  Injectable 1 milliGRAM(s) IntraMuscular once PRN Glucose LESS THAN 70 milligrams/deciliter        Vital Signs Last 24 Hrs  T(C): 36.3 (28 Jun 2019 07:05), Max: 36.3 (28 Jun 2019 07:05)  T(F): 97.4 (28 Jun 2019 07:05), Max: 97.4 (28 Jun 2019 07:05)  HR: 74 (28 Jun 2019 07:05) (74 - 81)  BP: 112/60 (28 Jun 2019 07:05) (96/51 - 115/54)  RR: 18 (28 Jun 2019 07:05) (16 - 100)  SpO2: 100% (28 Jun 2019 07:05) (100% - 100%)  I&O's Detail        Physical exam:   Gen- NAD  Resp- Clear b/l   CV- S1S2 RRR  ABD- +BSx4 ND/NT  EXT- No edema   Neuro- A&Ox3                             8.1    9.62  )-----------( 309      ( 28 Jun 2019 06:30 )             25.7       28 Jun 2019 06:30    137    |  101    |  63<H>  ----------------------------<  194<H>  4.2     |  21<L>  |  2.22<H>  27 Jun 2019 06:20    141    |  102    |  56<H>  ----------------------------<  135<H>  4.4     |  23     |  1.86<H>    Ca    9.3        28 Jun 2019 06:30  Ca    9.8        27 Jun 2019 06:20  Phos  5.9<H>     27 Jun 2019 06:20  Mg     2.1       28 Jun 2019 06:30  Mg     2.2       27 Jun 2019 06:20    TPro  7.1    /  Alb  3.2<L>  /  TBili  0.4    /  DBili  x      /  AST  17     /  ALT  22     /  AlkPhos  105    28 Jun 2019 06:30  TPro  7.2    /  Alb  3.1<L>  /  TBili  0.3    /  DBili  x      /  AST  19     /  ALT  24     /  AlkPhos  107    27 Jun 2019 06:20

## 2019-06-29 DIAGNOSIS — K76.9 LIVER DISEASE, UNSPECIFIED: ICD-10-CM

## 2019-06-29 DIAGNOSIS — Z02.9 ENCOUNTER FOR ADMINISTRATIVE EXAMINATIONS, UNSPECIFIED: ICD-10-CM

## 2019-06-29 LAB
ANION GAP SERPL CALC-SCNC: 17 MMO/L — HIGH (ref 7–14)
BUN SERPL-MCNC: 64 MG/DL — HIGH (ref 7–23)
CALCIUM SERPL-MCNC: 9.2 MG/DL — SIGNIFICANT CHANGE UP (ref 8.4–10.5)
CHLORIDE SERPL-SCNC: 103 MMOL/L — SIGNIFICANT CHANGE UP (ref 98–107)
CO2 SERPL-SCNC: 19 MMOL/L — LOW (ref 22–31)
CREAT SERPL-MCNC: 2.1 MG/DL — HIGH (ref 0.5–1.3)
GLUCOSE BLDC GLUCOMTR-MCNC: 121 MG/DL — HIGH (ref 70–99)
GLUCOSE BLDC GLUCOMTR-MCNC: 163 MG/DL — HIGH (ref 70–99)
GLUCOSE BLDC GLUCOMTR-MCNC: 176 MG/DL — HIGH (ref 70–99)
GLUCOSE BLDC GLUCOMTR-MCNC: 228 MG/DL — HIGH (ref 70–99)
GLUCOSE SERPL-MCNC: 127 MG/DL — HIGH (ref 70–99)
HCT VFR BLD CALC: 25.7 % — LOW (ref 34.5–45)
HGB BLD-MCNC: 8.3 G/DL — LOW (ref 11.5–15.5)
MAGNESIUM SERPL-MCNC: 2.1 MG/DL — SIGNIFICANT CHANGE UP (ref 1.6–2.6)
MCHC RBC-ENTMCNC: 26.9 PG — LOW (ref 27–34)
MCHC RBC-ENTMCNC: 32.3 % — SIGNIFICANT CHANGE UP (ref 32–36)
MCV RBC AUTO: 83.2 FL — SIGNIFICANT CHANGE UP (ref 80–100)
NRBC # FLD: 0 K/UL — SIGNIFICANT CHANGE UP (ref 0–0)
PHOSPHATE SERPL-MCNC: 4.9 MG/DL — HIGH (ref 2.5–4.5)
PLATELET # BLD AUTO: 351 K/UL — SIGNIFICANT CHANGE UP (ref 150–400)
PMV BLD: 11.6 FL — SIGNIFICANT CHANGE UP (ref 7–13)
POTASSIUM SERPL-MCNC: 4.1 MMOL/L — SIGNIFICANT CHANGE UP (ref 3.5–5.3)
POTASSIUM SERPL-SCNC: 4.1 MMOL/L — SIGNIFICANT CHANGE UP (ref 3.5–5.3)
RBC # BLD: 3.09 M/UL — LOW (ref 3.8–5.2)
RBC # FLD: 14.8 % — HIGH (ref 10.3–14.5)
SODIUM SERPL-SCNC: 139 MMOL/L — SIGNIFICANT CHANGE UP (ref 135–145)
WBC # BLD: 9.35 K/UL — SIGNIFICANT CHANGE UP (ref 3.8–10.5)
WBC # FLD AUTO: 9.35 K/UL — SIGNIFICANT CHANGE UP (ref 3.8–10.5)

## 2019-06-29 PROCEDURE — 99232 SBSQ HOSP IP/OBS MODERATE 35: CPT | Mod: GC

## 2019-06-29 RX ORDER — TICAGRELOR 90 MG/1
1 TABLET ORAL
Qty: 60 | Refills: 0
Start: 2019-06-29 | End: 2019-07-28

## 2019-06-29 RX ORDER — FUROSEMIDE 40 MG
1 TABLET ORAL
Qty: 0 | Refills: 0 | DISCHARGE

## 2019-06-29 RX ORDER — METOPROLOL TARTRATE 50 MG
0.5 TABLET ORAL
Qty: 30 | Refills: 0
Start: 2019-06-29 | End: 2019-07-28

## 2019-06-29 RX ORDER — CARVEDILOL PHOSPHATE 80 MG/1
1 CAPSULE, EXTENDED RELEASE ORAL
Qty: 0 | Refills: 0 | DISCHARGE

## 2019-06-29 RX ORDER — ATORVASTATIN CALCIUM 80 MG/1
1 TABLET, FILM COATED ORAL
Qty: 0 | Refills: 0 | DISCHARGE

## 2019-06-29 RX ORDER — TICAGRELOR 90 MG/1
1 TABLET ORAL
Qty: 0 | Refills: 0 | DISCHARGE

## 2019-06-29 RX ORDER — AMLODIPINE BESYLATE 2.5 MG/1
1 TABLET ORAL
Qty: 0 | Refills: 0 | DISCHARGE

## 2019-06-29 RX ORDER — ATORVASTATIN CALCIUM 80 MG/1
1 TABLET, FILM COATED ORAL
Qty: 30 | Refills: 0
Start: 2019-06-29 | End: 2019-07-28

## 2019-06-29 RX ADMIN — Medication 650 MILLIGRAM(S): at 23:30

## 2019-06-29 RX ADMIN — HEPARIN SODIUM 5000 UNIT(S): 5000 INJECTION INTRAVENOUS; SUBCUTANEOUS at 05:24

## 2019-06-29 RX ADMIN — SODIUM CHLORIDE 3 MILLILITER(S): 9 INJECTION INTRAMUSCULAR; INTRAVENOUS; SUBCUTANEOUS at 05:19

## 2019-06-29 RX ADMIN — Medication 2: at 13:20

## 2019-06-29 RX ADMIN — TICAGRELOR 90 MILLIGRAM(S): 90 TABLET ORAL at 21:47

## 2019-06-29 RX ADMIN — CHLORHEXIDINE GLUCONATE 1 APPLICATION(S): 213 SOLUTION TOPICAL at 05:24

## 2019-06-29 RX ADMIN — Medication 3 UNIT(S): at 13:20

## 2019-06-29 RX ADMIN — Medication 100 MILLIGRAM(S): at 17:18

## 2019-06-29 RX ADMIN — INSULIN GLARGINE 30 UNIT(S): 100 INJECTION, SOLUTION SUBCUTANEOUS at 21:47

## 2019-06-29 RX ADMIN — Medication 81 MILLIGRAM(S): at 08:57

## 2019-06-29 RX ADMIN — ISOSORBIDE DINITRATE 5 MILLIGRAM(S): 5 TABLET ORAL at 13:21

## 2019-06-29 RX ADMIN — Medication 5 MILLIGRAM(S): at 13:21

## 2019-06-29 RX ADMIN — ATORVASTATIN CALCIUM 80 MILLIGRAM(S): 80 TABLET, FILM COATED ORAL at 21:47

## 2019-06-29 RX ADMIN — Medication 650 MILLIGRAM(S): at 22:35

## 2019-06-29 RX ADMIN — Medication 3 UNIT(S): at 08:56

## 2019-06-29 RX ADMIN — SODIUM CHLORIDE 3 MILLILITER(S): 9 INJECTION INTRAMUSCULAR; INTRAVENOUS; SUBCUTANEOUS at 14:08

## 2019-06-29 RX ADMIN — Medication 4: at 17:17

## 2019-06-29 RX ADMIN — Medication 12.5 MILLIGRAM(S): at 17:17

## 2019-06-29 RX ADMIN — PANTOPRAZOLE SODIUM 40 MILLIGRAM(S): 20 TABLET, DELAYED RELEASE ORAL at 05:24

## 2019-06-29 RX ADMIN — HEPARIN SODIUM 5000 UNIT(S): 5000 INJECTION INTRAVENOUS; SUBCUTANEOUS at 21:47

## 2019-06-29 RX ADMIN — HEPARIN SODIUM 5000 UNIT(S): 5000 INJECTION INTRAVENOUS; SUBCUTANEOUS at 13:21

## 2019-06-29 RX ADMIN — TICAGRELOR 90 MILLIGRAM(S): 90 TABLET ORAL at 08:57

## 2019-06-29 RX ADMIN — Medication 100 MILLIGRAM(S): at 05:24

## 2019-06-29 RX ADMIN — SODIUM CHLORIDE 3 MILLILITER(S): 9 INJECTION INTRAMUSCULAR; INTRAVENOUS; SUBCUTANEOUS at 21:45

## 2019-06-29 RX ADMIN — Medication 3 UNIT(S): at 17:17

## 2019-06-29 NOTE — PROGRESS NOTE ADULT - PROBLEM SELECTOR PLAN 4
Appears euvolemic, not currently on diuretics. Continue hydralazine, isordil, Toprol XL as tolerated. BP remains stable but attempt to stagger meds when possible to avoid dizziness for patient compliance.

## 2019-06-29 NOTE — PROVIDER CONTACT NOTE (OTHER) - ACTION/TREATMENT ORDERED:
pt educated on importance of medication adherance but pt is still refusing. Tele PA, informed. Will continue to monitor

## 2019-06-29 NOTE — PROGRESS NOTE ADULT - ASSESSMENT
73 year old female with CKd stage 4, HTN, hyperlipidemia, DM-II, CHF (EF 20%) w/ ICD in left chest, and CAD with multiple PTCA/stents, most recent Feb 2019 who transfer from [facility] for NSTEMI s/p cath found to have 99% LM and 100% LAD received intra-aortic balloon pump and was transferred to CCU post cath for further management. also noted to have liver lesion likely need biopsy     RUTH  RUTH  scr elevated 2.22 on (6/28)  Etiology?  Renal function improving today   Continue to monitor bmp at present  Avoid further nephrotoxics, NSAIDS RCA    CKD stage 4  baseline ~ 1.7-1.9   CKD likely sec to DM/HTN/CHF  Continue to monitor bmp  Avoid nephrotoxic agents        HTN  controlled  monitor BP    proteinuria  urine p/c 355mg/day  likely sec to DM  vasculitis work up can be done outpatient    NSTEMI  f/u cardio    CKD-MBD  optimal PTH  elevated phos level, low phos diet. if worsen will start phoslo  monitor phos and calcium daily

## 2019-06-29 NOTE — PROGRESS NOTE ADULT - SUBJECTIVE AND OBJECTIVE BOX
Subjective/Objective: Patient resting comfortably at present, feels generally well. Refused am BP meds stating she gets dizzy but BP stable and no orthostatic changes noted.     MEDICATIONS  (STANDING):  aspirin enteric coated 81 milliGRAM(s) Oral daily  atorvastatin 80 milliGRAM(s) Oral at bedtime  chlorhexidine 4% Liquid 1 Application(s) Topical <User Schedule>  dextrose 5%. 1000 milliLiter(s) (50 mL/Hr) IV Continuous <Continuous>  dextrose 50% Injectable 12.5 Gram(s) IV Push once  dextrose 50% Injectable 25 Gram(s) IV Push once  docusate sodium 100 milliGRAM(s) Oral two times a day  heparin  Injectable 5000 Unit(s) SubCutaneous every 8 hours  hydrALAZINE 5 milliGRAM(s) Oral every 8 hours  insulin glargine Injectable (LANTUS) 30 Unit(s) SubCutaneous at bedtime  insulin lispro (HumaLOG) corrective regimen sliding scale   SubCutaneous three times a day before meals  insulin lispro (HumaLOG) corrective regimen sliding scale   SubCutaneous at bedtime  insulin lispro Injectable (HumaLOG) 3 Unit(s) SubCutaneous three times a day before meals  isosorbide   dinitrate Tablet (ISORDIL) 5 milliGRAM(s) Oral three times a day  metoprolol succinate ER 12.5 milliGRAM(s) Oral every 12 hours  pantoprazole    Tablet 40 milliGRAM(s) Oral before breakfast  senna 2 Tablet(s) Oral at bedtime  sodium chloride 0.9% lock flush 3 milliLiter(s) IV Push every 8 hours  ticagrelor 90 milliGRAM(s) Oral every 12 hours    MEDICATIONS  (PRN):  acetaminophen   Tablet .. 650 milliGRAM(s) Oral every 6 hours PRN Mild Pain (1 - 3), Moderate Pain (4 - 6), Severe Pain (7 - 10)  dextrose 40% Gel 15 Gram(s) Oral once PRN Blood Glucose LESS THAN 70 milliGRAM(s)/deciliter  glucagon  Injectable 1 milliGRAM(s) IntraMuscular once PRN Glucose LESS THAN 70 milligrams/deciliter          Vital Signs Last 24 Hrs  T(C): 36.6 (29 Jun 2019 05:22), Max: 36.6 (29 Jun 2019 05:22)  T(F): 97.9 (29 Jun 2019 05:22), Max: 97.9 (29 Jun 2019 05:22)  HR: 82 (29 Jun 2019 05:22) (78 - 82)  BP: 107/57 (29 Jun 2019 05:22) (100/45 - 112/55)  BP(mean): --  RR: 16 (29 Jun 2019 05:22) (16 - 18)  SpO2: 100% (29 Jun 2019 05:22) (100% - 100%)  I&O's Detail    28 Jun 2019 07:01  -  29 Jun 2019 07:00  --------------------------------------------------------  IN:    Oral Fluid: 200 mL  Total IN: 200 mL    OUT:    Voided: 200 mL  Total OUT: 200 mL    Total NET: 0 mL      PHYSICAL EXAM  GEN: NAD, skin W & D  RESP: CTA ant  CV: nl S1S2  GI: soft, NT/ND  EXT: no C/C/E  NEURO: A & O X3      EKG/ TELEM: NSR occ [facility]    LABS:                          8.3    9.35  )-----------( 351      ( 29 Jun 2019 05:44 )             25.7       29 Jun 2019 05:44    139    |  103    |  64<H>  ----------------------------<  127<H>  4.1     |  19<L>  |  2.10<H>    28 Jun 2019 06:30    137    |  101    |  63<H>  ----------------------------<  194<H>  4.2     |  21<L>  |  2.22<H>    Ca    9.2        29 Jun 2019 05:44  Ca    9.3        28 Jun 2019 06:30  Phos  4.9<H>     29 Jun 2019 05:44  Mg     2.1       29 Jun 2019 05:44  Mg     2.1       28 Jun 2019 06:30    TPro  7.1    /  Alb  3.2<L>  /  TBili  0.4    /  DBili  x      /  AST  17     /  ALT  22     /  AlkPhos  105    28 Jun 2019 06:30

## 2019-06-29 NOTE — PROGRESS NOTE ADULT - SUBJECTIVE AND OBJECTIVE BOX
[facility]   MD YENI MARTÍNEZ MD RUORU WONG, PA    TEL:  OFFICE: [phone]  DR LEUNG CELL: [phone]  MARK VELASQUEZ CELL: [phone]  DR. LUNA CELL: [phone]    RENAL FOLLOW UP NOTE  --------------------------------------------------------------------------------  HPI:      Pt seen and examined at bedside.       PAST HISTORY  --------------------------------------------------------------------------------  No significant changes to PMH, PSH, FHx, SHx, unless otherwise noted    ALLERGIES & MEDICATIONS  --------------------------------------------------------------------------------  Allergies    No Known Allergies    Intolerances      Standing Inpatient Medications  aspirin enteric coated 81 milliGRAM(s) Oral daily  atorvastatin 80 milliGRAM(s) Oral at bedtime  chlorhexidine 4% Liquid 1 Application(s) Topical <User Schedule>  dextrose 5%. 1000 milliLiter(s) IV Continuous <Continuous>  dextrose 50% Injectable 12.5 Gram(s) IV Push once  dextrose 50% Injectable 25 Gram(s) IV Push once  docusate sodium 100 milliGRAM(s) Oral two times a day  heparin  Injectable 5000 Unit(s) SubCutaneous every 8 hours  insulin glargine Injectable (LANTUS) 30 Unit(s) SubCutaneous at bedtime  insulin lispro (HumaLOG) corrective regimen sliding scale   SubCutaneous three times a day before meals  insulin lispro (HumaLOG) corrective regimen sliding scale   SubCutaneous at bedtime  insulin lispro Injectable (HumaLOG) 3 Unit(s) SubCutaneous three times a day before meals  metoprolol succinate ER 12.5 milliGRAM(s) Oral every 12 hours  pantoprazole    Tablet 40 milliGRAM(s) Oral before breakfast  senna 2 Tablet(s) Oral at bedtime  sodium chloride 0.9% lock flush 3 milliLiter(s) IV Push every 8 hours  ticagrelor 90 milliGRAM(s) Oral every 12 hours    PRN Inpatient Medications  acetaminophen   Tablet .. 650 milliGRAM(s) Oral every 6 hours PRN  dextrose 40% Gel 15 Gram(s) Oral once PRN  glucagon  Injectable 1 milliGRAM(s) IntraMuscular once PRN      REVIEW OF SYSTEMS  --------------------------------------------------------------------------------  General: no fever  ABD: no abdominal pain  : no dysuria,  MSK: no edema     VITALS/PHYSICAL EXAM  --------------------------------------------------------------------------------  T(C): 36.6 (06-29-19 @ 05:22), Max: 36.6 (06-29-19 @ 05:22)  HR: 82 (06-29-19 @ 05:22) (78 - 82)  BP: 107/57 (06-29-19 @ 05:22) (100/45 - 112/55)  RR: 16 (06-29-19 @ 05:22) (16 - 18)  SpO2: 100% (06-29-19 @ 05:22) (100% - 100%)  Wt(kg): --        06-28-19 @ 07:01  -  06-29-19 @ 07:00  --------------------------------------------------------  IN: 200 mL / OUT: 200 mL / NET: 0 mL    06-29-19 @ 07:01  -  06-29-19 @ 14:18  --------------------------------------------------------  IN: 320 mL / OUT: 0 mL / NET: 320 mL      Physical Exam:  	Gen: NAD  	HEENT: CATIE  	Pulm: CTA B/L  	CV: S1S2  	Abd: Soft, +BS  	Ext: No LE edema B/L                      Neuro: Awake   	Skin: Warm and Dry   	Gu no canelo    LABS/STUDIES  --------------------------------------------------------------------------------              8.3    9.35  >-----------<  351      [06-29-19 @ 05:44]              25.7     139  |  103  |  64  ----------------------------<  127      [06-29-19 @ 05:44]  4.1   |  19  |  2.10        Ca     9.2     [06-29-19 @ 05:44]      Mg     2.1     [06-29-19 @ 05:44]      Phos  4.9     [06-29-19 @ 05:44]    TPro  7.1  /  Alb  3.2  /  TBili  0.4  /  DBili  x   /  AST  17  /  ALT  22  /  AlkPhos  105  [06-28-19 @ 06:30]          Creatinine Trend:  SCr 2.10 [06-29 @ 05:44]  SCr 2.22 [06-28 @ 06:30]  SCr 1.86 [06-27 @ 06:20]  SCr 1.86 [06-26 @ 06:00]  SCr 1.93 [06-25 @ 15:30]    Urinalysis - [06-28-19 @ 11:55]      Color COLORLESS / Appearance CLEAR / SG 1.008 / pH 6.0      Gluc NEGATIVE / Ketone NEGATIVE  / Bili NEGATIVE / Urobili NORMAL       Blood TRACE / Protein NEGATIVE / Leuk Est NEGATIVE / Nitrite NEGATIVE      RBC  / WBC  / Hyaline  / Gran  / Sq Epi  / Non Sq Epi  / Bacteria     Urine Creatinine 21.20      [06-28-19 @ 11:55]  Urine Protein 19.2      [06-27-19 @ 06:30]  Urine Urea Nitrogen 199.2      [06-28-19 @ 11:55]    PTH 51.45 (Ca --)      [06-28-19 @ 06:30]   --  Vitamin D (25OH) 25.7      [06-28-19 @ 06:30]  HbA1c 15.1      [06-15-19 @ 12:17]  TSH 1.66      [06-15-19 @ 07:30]  Lipid: chol 118, , HDL 29, LDL 56      [06-15-19 @ 07:30]    HBsAb Reactive      [06-19-19 @ 19:32]  HBsAg NEGATIVE      [06-20-19 @ 12:00]  HBcAb Reactive      [06-19-19 @ 19:32]  HCV 0.09, Nonreact      [06-15-19 @ 12:23]

## 2019-06-29 NOTE — PROGRESS NOTE ADULT - ASSESSMENT
73F with HTN, HLD, CKD 4, DM2, HFrEF, s/p ICD, and CAD with multiple stents presents to OSH with NSTEMI. Transferred to [facility] for LHC which showed L main and LAD disease requiring IABP support and now s/p JACOB to LM. Course c/o inc LFTs with liver lesions now felt to be angiomyolipomas

## 2019-06-30 ENCOUNTER — TRANSCRIPTION ENCOUNTER (OUTPATIENT)
Age: 74
End: 2019-06-30

## 2019-06-30 VITALS
SYSTOLIC BLOOD PRESSURE: 108 MMHG | RESPIRATION RATE: 18 BRPM | HEART RATE: 76 BPM | TEMPERATURE: 98 F | DIASTOLIC BLOOD PRESSURE: 55 MMHG | OXYGEN SATURATION: 100 %

## 2019-06-30 DIAGNOSIS — E11.65 TYPE 2 DIABETES MELLITUS WITH HYPERGLYCEMIA: ICD-10-CM

## 2019-06-30 DIAGNOSIS — E11.8 TYPE 2 DIABETES MELLITUS WITH UNSPECIFIED COMPLICATIONS: ICD-10-CM

## 2019-06-30 LAB
ANION GAP SERPL CALC-SCNC: 16 MMO/L — HIGH (ref 7–14)
BUN SERPL-MCNC: 63 MG/DL — HIGH (ref 7–23)
CALCIUM SERPL-MCNC: 9.1 MG/DL — SIGNIFICANT CHANGE UP (ref 8.4–10.5)
CHLORIDE SERPL-SCNC: 102 MMOL/L — SIGNIFICANT CHANGE UP (ref 98–107)
CO2 SERPL-SCNC: 19 MMOL/L — LOW (ref 22–31)
CREAT SERPL-MCNC: 2.18 MG/DL — HIGH (ref 0.5–1.3)
GLUCOSE BLDC GLUCOMTR-MCNC: 258 MG/DL — HIGH (ref 70–99)
GLUCOSE BLDC GLUCOMTR-MCNC: 289 MG/DL — HIGH (ref 70–99)
GLUCOSE SERPL-MCNC: 234 MG/DL — HIGH (ref 70–99)
HCT VFR BLD CALC: 27 % — LOW (ref 34.5–45)
HGB BLD-MCNC: 8.6 G/DL — LOW (ref 11.5–15.5)
MAGNESIUM SERPL-MCNC: 2.2 MG/DL — SIGNIFICANT CHANGE UP (ref 1.6–2.6)
MCHC RBC-ENTMCNC: 26.3 PG — LOW (ref 27–34)
MCHC RBC-ENTMCNC: 31.9 % — LOW (ref 32–36)
MCV RBC AUTO: 82.6 FL — SIGNIFICANT CHANGE UP (ref 80–100)
NRBC # FLD: 0 K/UL — SIGNIFICANT CHANGE UP (ref 0–0)
PHOSPHATE SERPL-MCNC: 4.4 MG/DL — SIGNIFICANT CHANGE UP (ref 2.5–4.5)
PLATELET # BLD AUTO: 385 K/UL — SIGNIFICANT CHANGE UP (ref 150–400)
PMV BLD: 11.4 FL — SIGNIFICANT CHANGE UP (ref 7–13)
POTASSIUM SERPL-MCNC: 4.2 MMOL/L — SIGNIFICANT CHANGE UP (ref 3.5–5.3)
POTASSIUM SERPL-SCNC: 4.2 MMOL/L — SIGNIFICANT CHANGE UP (ref 3.5–5.3)
RBC # BLD: 3.27 M/UL — LOW (ref 3.8–5.2)
RBC # FLD: 14.8 % — HIGH (ref 10.3–14.5)
SODIUM SERPL-SCNC: 137 MMOL/L — SIGNIFICANT CHANGE UP (ref 135–145)
WBC # BLD: 8.26 K/UL — SIGNIFICANT CHANGE UP (ref 3.8–10.5)
WBC # FLD AUTO: 8.26 K/UL — SIGNIFICANT CHANGE UP (ref 3.8–10.5)

## 2019-06-30 PROCEDURE — 99222 1ST HOSP IP/OBS MODERATE 55: CPT

## 2019-06-30 RX ORDER — INSULIN LISPRO 100/ML
4 VIAL (ML) SUBCUTANEOUS
Refills: 0 | Status: DISCONTINUED | OUTPATIENT
Start: 2019-06-30 | End: 2019-06-30

## 2019-06-30 RX ORDER — INSULIN GLARGINE 100 [IU]/ML
35 INJECTION, SOLUTION SUBCUTANEOUS
Qty: 0 | Refills: 0 | DISCHARGE

## 2019-06-30 RX ORDER — INSULIN GLARGINE 100 [IU]/ML
30 INJECTION, SOLUTION SUBCUTANEOUS
Qty: 0 | Refills: 0 | DISCHARGE

## 2019-06-30 RX ORDER — INSULIN ASPART 100 [IU]/ML
4 INJECTION, SOLUTION SUBCUTANEOUS
Qty: 5 | Refills: 0
Start: 2019-06-30 | End: 2019-07-29

## 2019-06-30 RX ADMIN — PANTOPRAZOLE SODIUM 40 MILLIGRAM(S): 20 TABLET, DELAYED RELEASE ORAL at 04:44

## 2019-06-30 RX ADMIN — Medication 4 UNIT(S): at 13:08

## 2019-06-30 RX ADMIN — HEPARIN SODIUM 5000 UNIT(S): 5000 INJECTION INTRAVENOUS; SUBCUTANEOUS at 13:08

## 2019-06-30 RX ADMIN — HEPARIN SODIUM 5000 UNIT(S): 5000 INJECTION INTRAVENOUS; SUBCUTANEOUS at 04:44

## 2019-06-30 RX ADMIN — SODIUM CHLORIDE 3 MILLILITER(S): 9 INJECTION INTRAMUSCULAR; INTRAVENOUS; SUBCUTANEOUS at 04:43

## 2019-06-30 RX ADMIN — Medication 650 MILLIGRAM(S): at 11:31

## 2019-06-30 RX ADMIN — Medication 81 MILLIGRAM(S): at 09:20

## 2019-06-30 RX ADMIN — Medication 6: at 09:20

## 2019-06-30 RX ADMIN — TICAGRELOR 90 MILLIGRAM(S): 90 TABLET ORAL at 09:20

## 2019-06-30 RX ADMIN — Medication 100 MILLIGRAM(S): at 04:44

## 2019-06-30 RX ADMIN — SODIUM CHLORIDE 3 MILLILITER(S): 9 INJECTION INTRAMUSCULAR; INTRAVENOUS; SUBCUTANEOUS at 13:09

## 2019-06-30 RX ADMIN — Medication 6: at 13:08

## 2019-06-30 RX ADMIN — Medication 12.5 MILLIGRAM(S): at 04:44

## 2019-06-30 RX ADMIN — CHLORHEXIDINE GLUCONATE 1 APPLICATION(S): 213 SOLUTION TOPICAL at 04:44

## 2019-06-30 RX ADMIN — Medication 650 MILLIGRAM(S): at 10:19

## 2019-06-30 RX ADMIN — Medication 3 UNIT(S): at 09:21

## 2019-06-30 NOTE — CONSULT NOTE ADULT - PROBLEM SELECTOR PROBLEM 2
CAD (coronary artery disease)
Cardiogenic shock
Type 2 diabetes mellitus with complication, with long-term current use of insulin

## 2019-06-30 NOTE — PROGRESS NOTE ADULT - ASSESSMENT
73F with HTN, HLD, CKD 4, DM2, HFrEF, s/p ICD, and CAD with multiple stents presents to OSH with NSTEMI. Transferred to [facility] for LHC which showed L main and LAD disease requiring IABP support and now s/p JACOB to LM. Course c/b inc LFTs with liver lesions now felt to be angiomyolipomas

## 2019-06-30 NOTE — PROGRESS NOTE ADULT - SUBJECTIVE AND OBJECTIVE BOX
[facility] [place] [facility]   MD YENI MARTÍNEZ MD RUORU WONG, PA    TEL:  OFFICE: [phone]  DR LEUNG CELL: [phone]  MARK VELASQUEZ CELL: [phone]  DR. LUNA CELL: [phone]    RENAL FOLLOW UP NOTE  --------------------------------------------------------------------------------  HPI:      Pt seen and examined at bedside.   Janis SOB, chest pain     PAST HISTORY  --------------------------------------------------------------------------------  No significant changes to PMH, PSH, FHx, SHx, unless otherwise noted    ALLERGIES & MEDICATIONS  --------------------------------------------------------------------------------  Allergies    No Known Allergies    Intolerances      Standing Inpatient Medications  aspirin enteric coated 81 milliGRAM(s) Oral daily  atorvastatin 80 milliGRAM(s) Oral at bedtime  chlorhexidine 4% Liquid 1 Application(s) Topical <User Schedule>  dextrose 5%. 1000 milliLiter(s) IV Continuous <Continuous>  dextrose 50% Injectable 12.5 Gram(s) IV Push once  dextrose 50% Injectable 25 Gram(s) IV Push once  docusate sodium 100 milliGRAM(s) Oral two times a day  heparin  Injectable 5000 Unit(s) SubCutaneous every 8 hours  insulin glargine Injectable (LANTUS) 30 Unit(s) SubCutaneous at bedtime  insulin lispro (HumaLOG) corrective regimen sliding scale   SubCutaneous three times a day before meals  insulin lispro (HumaLOG) corrective regimen sliding scale   SubCutaneous at bedtime  insulin lispro Injectable (HumaLOG) 4 Unit(s) SubCutaneous three times a day before meals  metoprolol succinate ER 12.5 milliGRAM(s) Oral every 12 hours  pantoprazole    Tablet 40 milliGRAM(s) Oral before breakfast  senna 2 Tablet(s) Oral at bedtime  sodium chloride 0.9% lock flush 3 milliLiter(s) IV Push every 8 hours  ticagrelor 90 milliGRAM(s) Oral every 12 hours    PRN Inpatient Medications  acetaminophen   Tablet .. 650 milliGRAM(s) Oral every 6 hours PRN  dextrose 40% Gel 15 Gram(s) Oral once PRN  glucagon  Injectable 1 milliGRAM(s) IntraMuscular once PRN      REVIEW OF SYSTEMS  --------------------------------------------------------------------------------  General: no fever  CVS: no chest pain  RESP: no sob, no cough  ABD: no abdominal pain  MSK: no edema     VITALS/PHYSICAL EXAM  --------------------------------------------------------------------------------  T(C): 36.6 (06-30-19 @ 12:18), Max: 36.6 (06-29-19 @ 14:36)  HR: 76 (06-30-19 @ 12:18) (76 - 92)  BP: 108/55 (06-30-19 @ 12:18) (104/49 - 143/50)  RR: 18 (06-30-19 @ 12:18) (16 - 18)  SpO2: 100% (06-30-19 @ 12:18) (97% - 100%)  Wt(kg): --        06-29-19 @ 07:01  -  06-30-19 @ 07:00  --------------------------------------------------------  IN: 440 mL / OUT: 0 mL / NET: 440 mL      Physical Exam:  	Gen: NAD  	HEENT: MMM  	Pulm: CTA B/L  	CV: S1S2  	Abd: Soft, +BS  	Ext: No LE edema B/L                      Neuro: Awake   	Skin: Warm and Dry   	 no lemos    LABS/STUDIES  --------------------------------------------------------------------------------              8.6    8.26  >-----------<  385      [06-30-19 @ 05:40]              27.0     137  |  102  |  63  ----------------------------<  234      [06-30-19 @ 05:40]  4.2   |  19  |  2.18        Ca     9.1     [06-30-19 @ 05:40]      Mg     2.2     [06-30-19 @ 05:40]      Phos  4.4     [06-30-19 @ 05:40]            Creatinine Trend:  SCr 2.18 [06-30 @ 05:40]  SCr 2.10 [06-29 @ 05:44]  SCr 2.22 [06-28 @ 06:30]  SCr 1.86 [06-27 @ 06:20]  SCr 1.86 [06-26 @ 06:00]    Urinalysis - [06-28-19 @ 11:55]      Color COLORLESS / Appearance CLEAR / SG 1.008 / pH 6.0      Gluc NEGATIVE / Ketone NEGATIVE  / Bili NEGATIVE / Urobili NORMAL       Blood TRACE / Protein NEGATIVE / Leuk Est NEGATIVE / Nitrite NEGATIVE      RBC  / WBC  / Hyaline  / Gran  / Sq Epi  / Non Sq Epi  / Bacteria     Urine Creatinine 21.20      [06-28-19 @ 11:55]  Urine Protein 19.2      [06-27-19 @ 06:30]  Urine Urea Nitrogen 199.2      [06-28-19 @ 11:55]    PTH 51.45 (Ca --)      [06-28-19 @ 06:30]   --  Vitamin D (25OH) 25.7      [06-28-19 @ 06:30]  HbA1c 15.1      [06-15-19 @ 12:17]  TSH 1.66      [06-15-19 @ 07:30]  Lipid: chol 118, , HDL 29, LDL 56      [06-15-19 @ 07:30]    HBsAb Reactive      [06-19-19 @ 19:32]  HBsAg NEGATIVE      [06-20-19 @ 12:00]  HBcAb Reactive      [06-19-19 @ 19:32]  HCV 0.09, Nonreact      [06-15-19 @ 12:23]

## 2019-06-30 NOTE — PROGRESS NOTE ADULT - PROBLEM SELECTOR PLAN 4
Appears euvolemic, not on diuretics given RUTH. Hydralazine and Isordil discontinued due persistent c/o dizziness. Continue Toprol XL as tolerated. Appears euvolemic, not on diuretics given RUTH. Hydralazine and Isordil discontinued due to persistent c/o dizziness and refusal to take meds. Continue Toprol XL as tolerated.

## 2019-06-30 NOTE — PROGRESS NOTE ADULT - ASSESSMENT
73 year old female with CKd stage 4, HTN, hyperlipidemia, DM-II, CHF (EF 20%) w/ ICD in left chest, and CAD with multiple PTCA/stents, most recent Feb 2019 who transfer from [facility] for NSTEMI s/p cath found to have 99% LM and 100% LAD received intra-aortic balloon pump and was transferred to CCU post cath for further management. also noted to have liver lesion likely need biopsy     RUTH  RUTH  scr elevated 2.22 on (6/28)  Etiology?  Renal function improving today   Continue to monitor bmp at present  Avoid further nephrotoxics, NSAIDS RCA    CKD stage 4  baseline ~ 1.7-1.9   CKD likely sec to DM/HTN/CHF  Continue to monitor bmp  Avoid nephrotoxic agents        HTN  controlled  monitor BP    proteinuria  urine p/c 355mg/day  likely sec to DM  vasculitis work up can be done outpatient    NSTEMI  f/u cardio    CKD-MBD  optimal PTH  elevated phos level, low phos diet. if worsen will start phoslo  monitor phos and calcium daily

## 2019-06-30 NOTE — CONSULT NOTE ADULT - CONSULT REASON
Cardiogenic shock on balloon pump.
Liver lesions
renal failure
s/p cardiac cath with balloon pump
Diabetes management

## 2019-06-30 NOTE — PROGRESS NOTE ADULT - PROBLEM SELECTOR PROBLEM 4
Hypertension, unspecified type
Hypertension, unspecified type
Acute on chronic systolic heart failure
Hypertension, unspecified type
Hypertension, unspecified type
Acute on chronic systolic heart failure
Hypertension, unspecified type

## 2019-06-30 NOTE — PROGRESS NOTE ADULT - PROBLEM SELECTOR PROBLEM 5
Hyperlipidemia, unspecified hyperlipidemia type
Hyperlipidemia, unspecified hyperlipidemia type
Discharge planning issues
Hyperlipidemia, unspecified hyperlipidemia type
Hyperlipidemia, unspecified hyperlipidemia type
Diabetes mellitus type 2 in nonobese
Hyperlipidemia, unspecified hyperlipidemia type

## 2019-06-30 NOTE — PROGRESS NOTE ADULT - PROBLEM SELECTOR PROBLEM 1
NSTEMI (non-ST elevated myocardial infarction)
NSTEMI (non-ST elevated myocardial infarction)
Acute on chronic systolic heart failure
Acute on chronic systolic heart failure
Liver lesion, left lobe
NSTEMI (non-ST elevated myocardial infarction)

## 2019-06-30 NOTE — CONSULT NOTE ADULT - PROBLEM SELECTOR RECOMMENDATION 9
Pt with elevated fasting glucose this morning, but reasonable control yesterday.  Reports no bedtime snack and did not eat before blood glucose was checked this morning.   Recommend continued monitoring on Lantus 30 units  Postprandial glucose generally elevated, increase pre-meal humalog to 4 units  Continue correctional insulin  Plan to discharge on basal bolus insulin  Follow up with Endocrinology post discharge

## 2019-06-30 NOTE — PROGRESS NOTE ADULT - PROBLEM SELECTOR PROBLEM 2
CAD (coronary artery disease)
Cardiogenic shock
Cardiogenic shock
Liver lesion
Liver lesion
CAD (coronary artery disease)

## 2019-06-30 NOTE — PROGRESS NOTE ADULT - PROBLEM SELECTOR PLAN 5
Additional Safety/Bands: At discharge A1c noted to be 15.3. FS running 100s to low 200s with lantus, humalog, and IHSS. Discharge held yesterday and awaiting endocrine consult.

## 2019-06-30 NOTE — CONSULT NOTE ADULT - ATTENDING COMMENTS
Arianna Don MD  pager   Diabetes team: [phone] business hours  [phone] night/weekend
Chart reviewed, case d/w CCU attg and Dr. Lima.  Hemodynamics have nearly normalized on IABP support. Agree with LM revascularization today.  Will review echo.
73 year old female with HTN, hyperlipidemia, DM2, CHF EF 20% w/ AICD, and CAD with multiple PTCA/stents (last February 2019) presents with NSTEMI s/p diagnostic cath on 6/19/19 and intra-aortic balloon pump pending interventional cath, found to have two liver lesions - 2.4 cm, fat containing; and 5mm on single-phase CT. Liver non-cirrhotic, spleen not enlarged.  Pt. has risk factors for liver fibrosis - past obesity, DM2, HLD, and resolved hepatitis B (HBsAb+, HBcAb+). BMI 24.4 - pt. max. weight was 20 lbs heavier than now, up to 150 lbs. However, likely, the fat-containing lesion is an angiomyolipoma.    Dyspepsia with early satiety since February and 10 lbs weight loss. Dark stools on PO iron. Normocytic anemia, Hb 9, with normal RDW.  RUTH, now s/p iv contrast during diagnostic cath  MRI-incompatible AICD    - would get triple phase CT  - get AFP and Ca 19-9  - dyspepsia: EGD at some point; can be done as outpatient.  - needs screening colonoscopy

## 2019-06-30 NOTE — PROGRESS NOTE ADULT - PROBLEM SELECTOR PROBLEM 3
Liver lesion
RUTH (acute kidney injury)
RUTH (acute kidney injury)
Liver lesion

## 2019-06-30 NOTE — CONSULT NOTE ADULT - SUBJECTIVE AND OBJECTIVE BOX
HPI:  73 year old woman with CAD, admitted with NSTEMI and ruth, now post stent, with T2DM uncontrolled.  She has had diabetes x 22 years, takes insulin at home.  She takes lantus 30 units daily, no other insulin, has been in humalog in the past but was told she does not need it.  Diabetes is managed by her PMD.  She checks glucose daily, values are usually in the 200s mg/dl but with occasional 70 - 90 mg/dl, no hypoglycemia.  She tries to limit carbohydrate intake.  Reports minimal exercise ( walks up./down stairs to her apartment).  She has regular optho f/u,  has retinpathy and neuropathy, thinks she has no diabetic nephropathy but does recall kidney damage in february at that same time that she required Coronary stent placement.  + numbness in feet, + blurry vision on right.    She also has hypertension and hyperlipidemia that have been controlled with medication.      PAST MEDICAL & SURGICAL HISTORY:  Liver lesion  Stented coronary artery:  at [facility]  2019 [facility]  NSTEMI (non-ST elevated myocardial infarction)  Diabetes mellitus type 2 in nonobese  Constipation  Anemia  RUTH (acute kidney injury)  CAD (coronary artery disease)  Chronic CHF: Systolic EF 20%  Hyperlipidemia, unspecified hyperlipidemia type  Hypertension, unspecified type  Implantable cardioverter-defibrillator (ICD) in situ      FAMILY HISTORY:  Family history of heart attack (Father):  76yo  sister with diabetes      Social History: From [facility], in  x 22 years.  lives with her daughter.  .  Never smoker. No ETOH/Drugs    Outpatient Medications: Lantus 30 units daily    MEDICATIONS  (STANDING):  aspirin enteric coated 81 milliGRAM(s) Oral daily  atorvastatin 80 milliGRAM(s) Oral at bedtime  chlorhexidine 4% Liquid 1 Application(s) Topical <User Schedule>  dextrose 5%. 1000 milliLiter(s) (50 mL/Hr) IV Continuous <Continuous>  dextrose 50% Injectable 12.5 Gram(s) IV Push once  dextrose 50% Injectable 25 Gram(s) IV Push once  docusate sodium 100 milliGRAM(s) Oral two times a day  heparin  Injectable 5000 Unit(s) SubCutaneous every 8 hours  insulin glargine Injectable (LANTUS) 30 Unit(s) SubCutaneous at bedtime  insulin lispro (HumaLOG) corrective regimen sliding scale   SubCutaneous three times a day before meals  insulin lispro (HumaLOG) corrective regimen sliding scale   SubCutaneous at bedtime  insulin lispro Injectable (HumaLOG) 3 Unit(s) SubCutaneous three times a day before meals  metoprolol succinate ER 12.5 milliGRAM(s) Oral every 12 hours  pantoprazole    Tablet 40 milliGRAM(s) Oral before breakfast  senna 2 Tablet(s) Oral at bedtime  sodium chloride 0.9% lock flush 3 milliLiter(s) IV Push every 8 hours  ticagrelor 90 milliGRAM(s) Oral every 12 hours    MEDICATIONS  (PRN):  acetaminophen   Tablet .. 650 milliGRAM(s) Oral every 6 hours PRN Mild Pain (1 - 3), Moderate Pain (4 - 6), Severe Pain (7 - 10)  dextrose 40% Gel 15 Gram(s) Oral once PRN Blood Glucose LESS THAN 70 milliGRAM(s)/deciliter  glucagon  Injectable 1 milliGRAM(s) IntraMuscular once PRN Glucose LESS THAN 70 milligrams/deciliter      Allergies    No Known Allergies    Intolerances      Review of Systems:  Constitutional: No fever  Eyes: No eye pain  Neuro: No headache  HEENT: No throat pain  Cardiovascular: No chest pain  Respiratory: No SOB, no cough  GI: occasional  abdominal pain, shifting locations  : No dysuria  Skin: no rash  Psych: no depression  Endocrine: as noted in HPI  Hem/lymph: no swelling      PHYSICAL EXAM:  VITALS: T(C): 36.6 (19 @ 12:18)  T(F): 97.8 (19 @ 12:18), Max: 97.8 (19 @ 14:36)  HR: 76 (19 @ 12:18) (76 - 92)  BP: 108/55 (19 @ 12:18) (104/49 - 143/50)  RR:  (16 - 18)  SpO2:  (97% - 100%)  Wt(kg): 62.4  GENERAL: Lying in bed in NAD,   EYES: No proptosis,  HEENT:  Atraumatic, Normocephalic,   THYROID: Normal size, no palpable nodules  RESPIRATORY: Clear to auscultation bilaterally  CARDIOVASCULAR: Regular rhythm; No murmurs; no peripheral edema  GI: Soft, nontender, non distended, normal bowel sounds  SKIN: Dry, intact, No rashes or lesions  MUSCULOSKELETAL: normal strength  NEURO: extraocular movements intact, no tremor, normal reflexes  PSYCH: Alert and oriented x 3, normal affect, normal mood  CUSHING'S SIGNS: no striae    POCT Blood Glucose.: 258 mg/dL (19 @ 08:43)  POCT Blood Glucose.: 176 mg/dL (19 @ 21:11)  POCT Blood Glucose.: 228 mg/dL (19 @ 17:06)  POCT Blood Glucose.: 163 mg/dL (19 @ 13:02)  POCT Blood Glucose.: 121 mg/dL (19 @ 08:50)  POCT Blood Glucose.: 132 mg/dL (19 @ 22:03)  POCT Blood Glucose.: 246 mg/dL (19 @ 17:37)  POCT Blood Glucose.: 244 mg/dL (19 @ 13:24)  POCT Blood Glucose.: 202 mg/dL (19 @ 08:48)  POCT Blood Glucose.: 187 mg/dL (19 @ 22:03)  POCT Blood Glucose.: 275 mg/dL (19 @ 17:33)  POCT Blood Glucose.: 173 mg/dL (19 @ 13:02)                            8.6    8.26  )-----------( 385      ( 2019 05:40 )             27.0           137  |  102  |  63<H>  ----------------------------<  234<H>  4.2   |  19<L>  |  2.18<H>    EGFR if : 26  EGFR if non : 22    Ca    9.1        Mg     2.2       Phos  4.4         TPro  7.1  /  Alb  3.2<L>  /  TBili  0.4  /  DBili  x   /  AST  17  /  ALT  22  /  AlkPhos  105      Thyroid Function Tests:  06-15 @ 07:30 TSH 1.66 FreeT4 -- T3 -- Anti TPO -- Anti Thyroglobulin Ab -- TSI --      Hemoglobin A1C, Whole Blood: 15.1 % <H> [4.0 - 5.6] (06-15-19 @ 12:17)  Hemoglobin A1C, Whole Blood: 15.4 % <H> [4.0 - 5.6] (06-15-19 @ 01:34)      06-15 Chol 118 LDL 56 HDL 29<L> Trig 165<H>  Radiology:     Patient name: VINICIO DEY  YOB: 1945   Age: 73 (F)   MR#: [id number]  Study Date: 2019  Location: [address] [place], [place] [address]Sonographer: [facility]  Study quality: Fair  Referring Physician:  ELIDIA MURDOCK MD  Blood Pressure: 115/61 mmHg  Height: 157 cm  Weight: 61 kg  BSA: 1.6 m2  ------------------------------------------------------------------------    PROCEDURE: Transthoracic echocardiogram with 2-D, M-Mode  and complete spectral and color flow Doppler.  INDICATION: Chest pain, unspecified (R07.9)  HISTORY:  ------------------------------------------------------------------------  DIMENSIONS:  Dimensions:     Normal Values:  LA:     3.7 cm    2.0 - 4.0 cm  Ao:     2.9 cm    2.0 - 3.8 cm  SEPTUM: 0.7 cm    0.6 - 1.2 cm  PWT:    0.7 cm    0.6 - 1.1 cm  LVIDd:  5.8 cm    3.0 - 5.6 cm  LVIDs:  5.2 cm    1.8 - 4.0 cm      Derived Variables:  LVMI: 92 g/m2  RWT: 0.24  Ejection Fraction Visual Estimate: 20-25 %    ------------------------------------------------------------------------  OBSERVATIONS:  Mitral Valve: Mitral annular calcification, and calcified  mitral leaflets with normal diastolic opening. Tethered  mitral valve leaflets. Moderate mitral regurgitation.  Aortic Root: Normal aortic root.  Aortic Valve: Calcified aortic valve with normal opening.  Trace aortic regurgitation.  Left Atrium: Normal left atrium.  LA volume index = 16  cc/m2.  Left Ventricle: Severe segmental left ventricular systolic  dysfunction. The entire anterior wall, anteroseptum and  apex are akinetic. Mild left ventricular enlargement.  Right Heart: Normal right atrium. Normal right ventricular  size and function. A device lead is visualized in the right  heart. There is moderate tricuspid regurgitation. There is  trace pulmonicregurgitation.  Pericardium/PleuraNormal pericardium with no pericardial  effusion.  Hemodynamic: RA Pressure is 10 mm Hg. RV systolic pressure  is 68 mm Hg. Severe pulmonary hypertension.  ------------------------------------------------------------------------  CONCLUSIONS:  1. Mitral annular calcification, and calcified mitral  leaflets with normal diastolic opening. Tethered mitral  valve leaflets. Moderate mitral regurgitation.  2.  Trace aortic regurgitation.  3. Mild left ventricular enlargement.  4. Severe segmental left ventricular systolic dysfunction.  The entire anterior wall, anteroseptum and apex are  akinetic.  5. Normal right ventricular size and function. A device  lead is visualized in the right heart.  6. RV systolic pressure is 68 mm Hg. Severe pulmonary  hypertension.    ------------------------------------------------------------------------  Confirmed on  2019 - 10:26:55 by Juan Diego Loco MD  ------------------------------------------------------------------------

## 2019-06-30 NOTE — PROGRESS NOTE ADULT - PROBLEM SELECTOR PLAN 2
s/p stents in Feb 2019  Severe disease of left main and LAD  Given lesions not likely to be malignant pr hepatology, proceeding with caths  Patient with staged PCI to left main with laser on 6/21  Plan for staged PCI to LAD eventual
Patient will need to be re-vascularized once evaluated by GI for liver mass and a plan is developed  be unable to start dual antiplatelet therapy prior to liver biopsy/intervention  - given recent stents in Feb, will have to discuss whether patient can be off dual anti-platelet therapy for 5d prior to possible intervention  -for now, medical management with DAPT, lipitor, nitro gtt
No further workup indicated at this time. Continue to monitor.
Now off IABP.   BP stable.
Now off IABP.   BP stable.
Patient will need to be re-vascularized once evaluated by GI for liver mass and a plan is developed  be unable to start dual antiplatelet therapy prior to liver biopsy/intervention  - given recent stents in Feb, will have to discuss whether patient can be off dual anti-platelet therapy for 5d prior to possible intervention  -for now, medical management with DAPT, lipitor, nitro gtt
s/p stents in Feb 2019  Severe disease of left main and LAD  Given lesions not likely to be malignant pr hepatology, proceeding with caths  Patient with staged PCI to left main with laser on 6/21  Plan for staged PCI to LAD eventual
No further workup indicated at this time per hepatology.
Patient will need to be re-vascularized once evaluated by GI for liver mass and a plan is developed  be unable to start dual antiplatelet therapy prior to liver biopsy/intervention  - given recent stents in Feb, will have to discuss whether patient can be off dual anti-platelet therapy for 5d prior to possible intervention  -for now, medical management with DAPT, lipitor, nitro gtt

## 2019-06-30 NOTE — CONSULT NOTE ADULT - PROBLEM SELECTOR PROBLEM 1
NSTEMI (non-ST elevated myocardial infarction)
Acute on chronic systolic heart failure
Liver lesion, left lobe
Type 2 diabetes mellitus with hyperglycemia, with long-term current use of insulin

## 2019-06-30 NOTE — PROGRESS NOTE ADULT - SUBJECTIVE AND OBJECTIVE BOX
Subjective/Objective: Patient resting comfortably. No overnight events noted.    MEDICATIONS  (STANDING):  aspirin enteric coated 81 milliGRAM(s) Oral daily  atorvastatin 80 milliGRAM(s) Oral at bedtime  chlorhexidine 4% Liquid 1 Application(s) Topical <User Schedule>  dextrose 5%. 1000 milliLiter(s) (50 mL/Hr) IV Continuous <Continuous>  dextrose 50% Injectable 12.5 Gram(s) IV Push once  dextrose 50% Injectable 25 Gram(s) IV Push once  docusate sodium 100 milliGRAM(s) Oral two times a day  heparin  Injectable 5000 Unit(s) SubCutaneous every 8 hours  insulin glargine Injectable (LANTUS) 30 Unit(s) SubCutaneous at bedtime  insulin lispro (HumaLOG) corrective regimen sliding scale   SubCutaneous three times a day before meals  insulin lispro (HumaLOG) corrective regimen sliding scale   SubCutaneous at bedtime  insulin lispro Injectable (HumaLOG) 3 Unit(s) SubCutaneous three times a day before meals  metoprolol succinate ER 12.5 milliGRAM(s) Oral every 12 hours  pantoprazole    Tablet 40 milliGRAM(s) Oral before breakfast  senna 2 Tablet(s) Oral at bedtime  sodium chloride 0.9% lock flush 3 milliLiter(s) IV Push every 8 hours  ticagrelor 90 milliGRAM(s) Oral every 12 hours    MEDICATIONS  (PRN):  acetaminophen   Tablet .. 650 milliGRAM(s) Oral every 6 hours PRN Mild Pain (1 - 3), Moderate Pain (4 - 6), Severe Pain (7 - 10)  dextrose 40% Gel 15 Gram(s) Oral once PRN Blood Glucose LESS THAN 70 milliGRAM(s)/deciliter  glucagon  Injectable 1 milliGRAM(s) IntraMuscular once PRN Glucose LESS THAN 70 milligrams/deciliter          Vital Signs Last 24 Hrs  T(C): 36.4 (30 Jun 2019 04:39), Max: 36.6 (29 Jun 2019 14:36)  T(F): 97.5 (30 Jun 2019 04:39), Max: 97.8 (29 Jun 2019 14:36)  HR: 82 (30 Jun 2019 04:39) (80 - 92)  BP: 117/54 (30 Jun 2019 04:39) (104/49 - 143/50)  BP(mean): --  RR: 18 (30 Jun 2019 04:39) (16 - 18)  SpO2: 100% (30 Jun 2019 04:39) (97% - 100%)  I&O's Detail    29 Jun 2019 07:01  -  30 Jun 2019 07:00  --------------------------------------------------------  IN:    Oral Fluid: 440 mL  Total IN: 440 mL    OUT:  Total OUT: 0 mL    Total NET: 440 mL      PHYSICAL EXAM  GEN: NAD, skin W & D  RESP: CTA  CV: nl S1S2  GI:  EXT: no C/C/E  NEURO: A & O X 3      EKG/ TELEM: NSR occ [facility]    LABS:                          8.6    8.26  )-----------( 385      ( 30 Jun 2019 05:40 )             27.0       30 Jun 2019 05:40    137    |  102    |  63<H>  ----------------------------<  234<H>  4.2     |  19<L>  |  2.18<H>    29 Jun 2019 05:44    139    |  103    |  64<H>  ----------------------------<  127<H>  4.1     |  19<L>  |  2.10<H>    Ca    9.1        30 Jun 2019 05:40  Ca    9.2        29 Jun 2019 05:44  Phos  4.4       30 Jun 2019 05:40  Phos  4.9<H>     29 Jun 2019 05:44  Mg     2.2       30 Jun 2019 05:40  Mg     2.1       29 Jun 2019 05:44

## 2019-06-30 NOTE — DISCHARGE NOTE NURSING/CASE MANAGEMENT/SOCIAL WORK - NSDCDPATPORTLINK_GEN_ALL_CORE
You can access the [facility] Patient Portal, offered by [facility], by registering with the following website: [email or web address]

## 2019-06-30 NOTE — CONSULT NOTE ADULT - PROBLEM SELECTOR RECOMMENDATION 2
As above
Patient will need to be re-vascularized once evaluated by GI for liver mass and a plan is developed  -for now, medical management with DAPT, lipitor, nitro gtt

## 2019-06-30 NOTE — PROGRESS NOTE ADULT - PROVIDER SPECIALTY LIST ADULT
CCU
Cardiology
Heart Failure
Heart Failure
Hepatology
Nephrology
CCU
CCU
Nephrology
CCU
Cardiology
Cardiology

## 2019-06-30 NOTE — CONSULT NOTE ADULT - PROBLEM SELECTOR RECOMMENDATION 3
Continue metoprolol
Patient will need GI consultation in the AM for further evaluation and work up regarding the liver mass found on CT scan  -cardiac revascularization is pending a possible liver biospy, patient will be unable to start dual antiplatelet therapy prior to liver biopsy/intervention  -cardiology would also like pathology of the liver lesion prior to revascularization

## 2019-07-02 PROBLEM — I21.4 NON-ST ELEVATION (NSTEMI) MYOCARDIAL INFARCTION: Chronic | Status: ACTIVE | Noted: 2019-06-19

## 2019-07-02 PROBLEM — E11.9 TYPE 2 DIABETES MELLITUS WITHOUT COMPLICATIONS: Chronic | Status: ACTIVE | Noted: 2019-06-19

## 2019-07-02 PROBLEM — K76.9 LIVER DISEASE, UNSPECIFIED: Chronic | Status: ACTIVE | Noted: 2019-06-19

## 2019-07-02 PROBLEM — Z00.00 ENCOUNTER FOR PREVENTIVE HEALTH EXAMINATION: Status: ACTIVE | Noted: 2019-07-02

## 2019-07-02 PROBLEM — Z95.5 PRESENCE OF CORONARY ANGIOPLASTY IMPLANT AND GRAFT: Chronic | Status: ACTIVE | Noted: 2019-06-19

## 2019-07-02 PROBLEM — D64.9 ANEMIA, UNSPECIFIED: Chronic | Status: ACTIVE | Noted: 2019-06-19

## 2019-07-02 PROBLEM — K59.00 CONSTIPATION, UNSPECIFIED: Chronic | Status: ACTIVE | Noted: 2019-06-19

## 2019-07-02 PROBLEM — N17.9 ACUTE KIDNEY FAILURE, UNSPECIFIED: Chronic | Status: ACTIVE | Noted: 2019-06-19

## 2019-07-08 NOTE — PROGRESS NOTE ADULT - PROBLEM SELECTOR PLAN 8
[] Previous VTE                                                3  [] Thrombophilia                                             2  [] Lower limb paralysis                                   2    [] Current Cancer                                             2   [x] Immobilization > 24 hrs                              1  [] ICU/CCU stay > 24 hours                             1  [x] Age > 60                                                         1    IMPROVE VTE Score: On heparin Drip for ACS
Statement Selected

## 2019-07-10 ENCOUNTER — APPOINTMENT (OUTPATIENT)
Dept: CARDIOLOGY | Facility: CLINIC | Age: 74
End: 2019-07-10

## 2020-12-11 NOTE — PHYSICAL THERAPY INITIAL EVALUATION ADULT - BALANCE DISTURBANCE, IDENTIFIED IMPAIRMENT CONTRIBUTE, REHAB EVAL
----- Message from Leatha Bowden LPN sent at 12/1/2020  1:20 PM CST -----  Regarding: BP  Call patient and obtain BP readings.      Decreased endurance/decreased strength

## 2021-11-15 NOTE — ED PROVIDER NOTE - NS ED ATTENDING STATEMENT MOD
Unna Boot Text: An Unna boot was placed to help immobilize the limb and facilitate more rapid healing. Attending Only

## 2021-11-29 NOTE — PROGRESS NOTE ADULT - ATTENDING COMMENTS
Patient was seen and examined,interim events noted,labs and radiology studies reviewed.  Salvador Hood MD,FACC.  [address] [place] [address].  [id number].  [phone] N/A

## 2022-06-30 NOTE — PATIENT PROFILE ADULT - BRADEN SCORE
20 Oxybutynin Counseling:  I discussed with the patient the risks of oxybutynin including but not limited to skin rash, drowsiness, dry mouth, difficulty urinating, and blurred vision.

## 2022-08-22 NOTE — PROVIDER CONTACT NOTE (OTHER) - ASSESSMENT
Vitals as noted. PT asymptomatic. Tremfya Counseling: I discussed with the patient the risks of guselkumab including but not limited to immunosuppression, serious infections, and drug reactions.  The patient understands that monitoring is required including a PPD at baseline and must alert us or the primary physician if symptoms of infection or other concerning signs are noted.

## 2022-08-26 NOTE — H&P ADULT - EXTREMITIES
detailed exam folic acid 1 mg oral tablet , 1 tab(s) orally once a day  Multiple Vitamins oral tablet , 1 tab(s) orally once a day  clotrimazole 1% topical cream , 1 application topically 2 times a day  melatonin 3 mg oral tablet , 1 tab(s) orally once a day (at bedtime), As needed, Insomnia  ARIPiprazole 20 mg oral tablet , 1 tab(s) orally once a day  haloperidol , 5 milligram(s) intramuscular every 6 hours, As Needed for agitaiton  haloperidol 5 mg oral tablet , 1 tab(s) orally every 6 hours, As needed, anxiety/ agitation  divalproex sodium 500 mg oral delayed release tablet , 1 tab(s) orally once a day (at bedtime)

## 2024-06-25 NOTE — DISCHARGE NOTE PROVIDER - REASON FOR ADMISSION
Spoke to father and relayed that patient should see [facility]. Provided him their clinic number.    transfer from [facility] for NSTEMI

## 2025-02-10 NOTE — DIETITIAN INITIAL EVALUATION ADULT. - PERTINENT MEDS FT
CAPILLARY BLOOD GLUCOSE      POCT Blood Glucose.: 221 mg/dL (24 Jun 2019 11:50)  POCT Blood Glucose.: 156 mg/dL (24 Jun 2019 07:57)  POCT Blood Glucose.: 275 mg/dL (23 Jun 2019 21:30)  POCT Blood Glucose.: 365 mg/dL (23 Jun 2019 16:55)    MEDICATIONS  (STANDING):  aspirin enteric coated 81 milliGRAM(s) Oral daily  atorvastatin 40 milliGRAM(s) Oral at bedtime  chlorhexidine 4% Liquid 1 Application(s) Topical <User Schedule>  dextrose 5%. 1000 milliLiter(s) (50 mL/Hr) IV Continuous <Continuous>  dextrose 50% Injectable 12.5 Gram(s) IV Push once  dextrose 50% Injectable 25 Gram(s) IV Push once  docusate sodium 100 milliGRAM(s) Oral two times a day  heparin  Injectable 5000 Unit(s) SubCutaneous every 8 hours  insulin glargine Injectable (LANTUS) 30 Unit(s) SubCutaneous at bedtime  insulin lispro (HumaLOG) corrective regimen sliding scale   SubCutaneous three times a day before meals  insulin lispro (HumaLOG) corrective regimen sliding scale   SubCutaneous at bedtime  insulin lispro Injectable (HumaLOG) 3 Unit(s) SubCutaneous three times a day before meals  metoprolol succinate ER 12.5 milliGRAM(s) Oral daily  pantoprazole    Tablet 40 milliGRAM(s) Oral before breakfast  senna 2 Tablet(s) Oral at bedtime  sodium chloride 0.9% lock flush 3 milliLiter(s) IV Push every 8 hours  ticagrelor 90 milliGRAM(s) Oral every 12 hours    Diet, DASH/TLC:   Sodium & Cholesterol Restricted  Consistent Carbohydrate {No Snacks} (CSTCHO) (06-20-19 @ 11:06)

## 2025-03-04 NOTE — PHYSICAL THERAPY INITIAL EVALUATION ADULT - GAIT DISTANCE, PT EVAL
<-- Click to add NO pertinent Past Medical History Pt. ambulated 35 feet x 1. Required seated rest break secondary to fatigue. pt. ambulated 50 feet requiring a seated rest break. After seated rest break pt. ambulated 25 feet back to room./50 feet